# Patient Record
Sex: FEMALE | Race: WHITE | NOT HISPANIC OR LATINO | Employment: OTHER | URBAN - METROPOLITAN AREA
[De-identification: names, ages, dates, MRNs, and addresses within clinical notes are randomized per-mention and may not be internally consistent; named-entity substitution may affect disease eponyms.]

---

## 2018-03-13 ENCOUNTER — OFFICE VISIT CONVERTED (OUTPATIENT)
Dept: ORTHOPEDIC SURGERY | Facility: CLINIC | Age: 53
End: 2018-03-13
Attending: ORTHOPAEDIC SURGERY

## 2018-03-13 ENCOUNTER — CONVERSION ENCOUNTER (OUTPATIENT)
Dept: ORTHOPEDIC SURGERY | Facility: CLINIC | Age: 53
End: 2018-03-13

## 2018-03-22 ENCOUNTER — CONVERSION ENCOUNTER (OUTPATIENT)
Dept: ORTHOPEDIC SURGERY | Facility: CLINIC | Age: 53
End: 2018-03-22

## 2018-03-22 ENCOUNTER — OFFICE VISIT CONVERTED (OUTPATIENT)
Dept: ORTHOPEDIC SURGERY | Facility: CLINIC | Age: 53
End: 2018-03-22
Attending: ORTHOPAEDIC SURGERY

## 2019-03-07 ENCOUNTER — HOSPITAL ENCOUNTER (OUTPATIENT)
Dept: OTHER | Facility: HOSPITAL | Age: 54
Discharge: HOME | End: 2019-03-07

## 2019-03-07 LAB
BASOPHILS # BLD AUTO: 0.03 10*3/UL (ref 0–0.2)
BASOPHILS NFR BLD AUTO: 0.6 % (ref 0–3)
CONV ABS IMM GRAN: 0.01 10*3/UL (ref 0–0.2)
CONV IMMATURE GRAN: 0.2 % (ref 0–1.8)
DEPRECATED RDW RBC AUTO: 57.4 FL (ref 36.4–46.3)
EOSINOPHIL # BLD AUTO: 0.13 10*3/UL (ref 0–0.7)
EOSINOPHIL # BLD AUTO: 2.7 % (ref 0–7)
ERYTHROCYTE [DISTWIDTH] IN BLOOD BY AUTOMATED COUNT: 14.8 % (ref 11.7–14.4)
HBA1C MFR BLD: 11.1 G/DL (ref 12–16)
HCT VFR BLD AUTO: 34.6 % (ref 37–47)
LYMPHOCYTES # BLD AUTO: 2.08 10*3/UL (ref 1–5)
MCH RBC QN AUTO: 34.2 PG (ref 27–31)
MCHC RBC AUTO-ENTMCNC: 32.1 G/DL (ref 33–37)
MCV RBC AUTO: 106.5 FL (ref 81–99)
MONOCYTES # BLD AUTO: 0.77 10*3/UL (ref 0.2–1.2)
MONOCYTES NFR BLD AUTO: 16 % (ref 3–10)
NEUTROPHILS # BLD AUTO: 1.78 10*3/UL (ref 2–8)
NEUTROPHILS NFR BLD AUTO: 37.2 % (ref 30–85)
NRBC CBCN: 0 % (ref 0–0.7)
PLATELET # BLD AUTO: 137 10*3/UL (ref 130–400)
PMV BLD AUTO: 10.6 FL (ref 9.4–12.3)
RBC # BLD AUTO: 3.25 10*6/UL (ref 4.2–5.4)
VARIANT LYMPHS NFR BLD MANUAL: 43.3 % (ref 20–45)
WBC # BLD AUTO: 4.8 10*3/UL (ref 4.8–10.8)

## 2019-03-10 LAB
CD3 CELLS # BLD: 1177 CELLS/UL (ref 570–2400)
CD3+CD4+ CELLS/CD3+CD8+ CLL BLD: 0.09 RATIO (ref 0.8–3.9)
CONV LYMPHOCYTE SUBSET PANEL 3 INFO.: NORMAL
DEPRECATED CD4 CELLS # BLD: 84 CELLS/UL (ref 430–1800)
DEPRECATED CD8 CELLS # BLD: 959 CELLS/UL (ref 210–1200)

## 2019-09-25 ENCOUNTER — HOSPITAL ENCOUNTER (OUTPATIENT)
Dept: GENERAL RADIOLOGY | Facility: HOSPITAL | Age: 54
Discharge: HOME | End: 2019-09-25

## 2019-09-25 LAB
CREAT BLD-MCNC: 0.7 MG/DL (ref 0.6–1.4)
GFR SERPLBLD BASED ON 1.73 SQ M-ARVRAT: >60 ML/MIN/{1.73_M2}

## 2019-09-27 ENCOUNTER — CONVERSION ENCOUNTER (OUTPATIENT)
Dept: PODIATRY | Facility: CLINIC | Age: 54
End: 2019-09-27

## 2019-09-27 ENCOUNTER — OFFICE VISIT CONVERTED (OUTPATIENT)
Dept: PODIATRY | Facility: CLINIC | Age: 54
End: 2019-09-27
Attending: PODIATRIST

## 2019-11-25 ENCOUNTER — HOSPITAL ENCOUNTER (OUTPATIENT)
Dept: GENERAL RADIOLOGY | Facility: HOSPITAL | Age: 54
Discharge: HOME | End: 2019-11-25
Attending: NURSE PRACTITIONER

## 2019-11-26 ENCOUNTER — OFFICE VISIT CONVERTED (OUTPATIENT)
Dept: OTOLARYNGOLOGY | Facility: CLINIC | Age: 54
End: 2019-11-26
Attending: OTOLARYNGOLOGY

## 2019-12-19 ENCOUNTER — OFFICE VISIT CONVERTED (OUTPATIENT)
Dept: OTOLARYNGOLOGY | Facility: CLINIC | Age: 54
End: 2019-12-19
Attending: OTOLARYNGOLOGY

## 2020-04-17 ENCOUNTER — HOSPITAL ENCOUNTER (OUTPATIENT)
Dept: OTHER | Facility: HOSPITAL | Age: 55
Discharge: HOME | End: 2020-04-17

## 2020-04-17 LAB
25(OH)D3 SERPL-MCNC: 17.4 NG/ML (ref 30–100)
ALBUMIN SERPL-MCNC: 3.4 G/DL (ref 3.5–5)
ALBUMIN/GLOB SERPL: 0.8 {RATIO} (ref 1.4–2.6)
ALP SERPL-CCNC: 225 U/L (ref 53–141)
ALT SERPL-CCNC: 37 U/L (ref 10–40)
ANION GAP SERPL CALC-SCNC: 17 MMOL/L (ref 8–19)
AST SERPL-CCNC: 40 U/L (ref 15–50)
BASOPHILS # BLD AUTO: 0.03 10*3/UL (ref 0–0.2)
BASOPHILS NFR BLD AUTO: 0.6 % (ref 0–3)
BILIRUB SERPL-MCNC: 1.31 MG/DL (ref 0.2–1.3)
BUN SERPL-MCNC: 15 MG/DL (ref 5–25)
BUN/CREAT SERPL: 15 {RATIO} (ref 6–20)
CALCIUM SERPL-MCNC: 9.5 MG/DL (ref 8.7–10.4)
CHLORIDE SERPL-SCNC: 106 MMOL/L (ref 99–111)
CONV ABS IMM GRAN: 0.01 10*3/UL (ref 0–0.2)
CONV CO2: 26 MMOL/L (ref 22–32)
CONV IMMATURE GRAN: 0.2 % (ref 0–1.8)
CONV TOTAL PROTEIN: 7.9 G/DL (ref 6.3–8.2)
CREAT UR-MCNC: 1.02 MG/DL (ref 0.5–0.9)
DEPRECATED RDW RBC AUTO: 57.7 FL (ref 36.4–46.3)
EOSINOPHIL # BLD AUTO: 0.14 10*3/UL (ref 0–0.7)
EOSINOPHIL # BLD AUTO: 2.8 % (ref 0–7)
ERYTHROCYTE [DISTWIDTH] IN BLOOD BY AUTOMATED COUNT: 14.1 % (ref 11.7–14.4)
GFR SERPLBLD BASED ON 1.73 SQ M-ARVRAT: >60 ML/MIN/{1.73_M2}
GLOBULIN UR ELPH-MCNC: 4.5 G/DL (ref 2–3.5)
GLUCOSE SERPL-MCNC: 126 MG/DL (ref 65–99)
HCT VFR BLD AUTO: 40.5 % (ref 37–47)
HGB BLD-MCNC: 12.8 G/DL (ref 12–16)
LYMPHOCYTES # BLD AUTO: 2.28 10*3/UL (ref 1–5)
LYMPHOCYTES NFR BLD AUTO: 45.3 % (ref 20–45)
MCH RBC QN AUTO: 34.5 PG (ref 27–31)
MCHC RBC AUTO-ENTMCNC: 31.6 G/DL (ref 33–37)
MCV RBC AUTO: 109.2 FL (ref 81–99)
MONOCYTES # BLD AUTO: 0.48 10*3/UL (ref 0.2–1.2)
MONOCYTES NFR BLD AUTO: 9.5 % (ref 3–10)
NEUTROPHILS # BLD AUTO: 2.09 10*3/UL (ref 2–8)
NEUTROPHILS NFR BLD AUTO: 41.6 % (ref 30–85)
NRBC CBCN: 0 % (ref 0–0.7)
OSMOLALITY SERPL CALC.SUM OF ELEC: 302 MOSM/KG (ref 273–304)
PLATELET # BLD AUTO: 142 10*3/UL (ref 130–400)
PMV BLD AUTO: 10.3 FL (ref 9.4–12.3)
POTASSIUM SERPL-SCNC: 4.3 MMOL/L (ref 3.5–5.3)
RBC # BLD AUTO: 3.71 10*6/UL (ref 4.2–5.4)
SODIUM SERPL-SCNC: 145 MMOL/L (ref 135–147)
WBC # BLD AUTO: 5.03 10*3/UL (ref 4.8–10.8)

## 2020-04-19 LAB — Lab: NORMAL

## 2020-04-20 LAB
HIV1 RNA # SERPL NAA+PROBE: <20 COPIES/ML
HIV1 RNA # SERPL NAA+PROBE: NORMAL LOG10COPY/ML
Lab: NORMAL

## 2020-08-10 ENCOUNTER — HOSPITAL ENCOUNTER (OUTPATIENT)
Dept: UROLOGY | Facility: CLINIC | Age: 55
Discharge: HOME | End: 2020-08-10
Attending: UROLOGY

## 2020-08-10 ENCOUNTER — OFFICE VISIT CONVERTED (OUTPATIENT)
Dept: UROLOGY | Facility: CLINIC | Age: 55
End: 2020-08-10
Attending: UROLOGY

## 2020-08-12 ENCOUNTER — HOSPITAL ENCOUNTER (OUTPATIENT)
Dept: PREADMISSION TESTING | Facility: HOSPITAL | Age: 55
Discharge: HOME | End: 2020-08-12
Attending: UROLOGY

## 2020-08-13 LAB
AMOXICILLIN+CLAV SUSC ISLT: 4
AMPICILLIN SUSC ISLT: 8
AMPICILLIN+SULBAC SUSC ISLT: 4
BACTERIA UR CULT: ABNORMAL
CEFAZOLIN SUSC ISLT: <=4
CEFEPIME SUSC ISLT: <=1
CEFTAZIDIME SUSC ISLT: <=1
CEFTRIAXONE SUSC ISLT: <=1
CEFUROXIME ORAL SUSC ISLT: 4
CEFUROXIME PARENTER SUSC ISLT: 4
CIPROFLOXACIN SUSC ISLT: <=0.25
ERTAPENEM SUSC ISLT: <=0.5
GENTAMICIN SUSC ISLT: <=1
LEVOFLOXACIN SUSC ISLT: <=0.12
NITROFURANTOIN SUSC ISLT: <=16
SARS-COV-2 RNA SPEC QL NAA+PROBE: NOT DETECTED
TETRACYCLINE SUSC ISLT: <=1
TMP SMX SUSC ISLT: <=20
TOBRAMYCIN SUSC ISLT: <=1

## 2020-08-17 ENCOUNTER — HOSPITAL ENCOUNTER (OUTPATIENT)
Dept: PERIOP | Facility: HOSPITAL | Age: 55
Setting detail: HOSPITAL OUTPATIENT SURGERY
Discharge: HOME | End: 2020-08-17
Attending: UROLOGY

## 2020-08-17 LAB
ALBUMIN SERPL-MCNC: 2.9 G/DL (ref 3.5–5)
ALBUMIN/GLOB SERPL: 0.6 {RATIO} (ref 1.4–2.6)
ALP SERPL-CCNC: 99 U/L (ref 53–141)
ALT SERPL-CCNC: 28 U/L (ref 10–40)
ANION GAP SERPL CALC-SCNC: 14 MMOL/L (ref 8–19)
AST SERPL-CCNC: 43 U/L (ref 15–50)
BASOPHILS # BLD AUTO: 0.03 10*3/UL (ref 0–0.2)
BASOPHILS NFR BLD AUTO: 1 % (ref 0–3)
BILIRUB SERPL-MCNC: 1.07 MG/DL (ref 0.2–1.3)
BUN SERPL-MCNC: 11 MG/DL (ref 5–25)
BUN/CREAT SERPL: 17 {RATIO} (ref 6–20)
CALCIUM SERPL-MCNC: 9.8 MG/DL (ref 8.7–10.4)
CHLORIDE SERPL-SCNC: 104 MMOL/L (ref 99–111)
CONV ABS IMM GRAN: 0 10*3/UL (ref 0–0.2)
CONV CO2: 29 MMOL/L (ref 22–32)
CONV IMMATURE GRAN: 0 % (ref 0–1.8)
CONV TOTAL PROTEIN: 8.1 G/DL (ref 6.3–8.2)
CREAT UR-MCNC: 0.64 MG/DL (ref 0.5–0.9)
DEPRECATED RDW RBC AUTO: 51.4 FL (ref 36.4–46.3)
EOSINOPHIL # BLD AUTO: 0.1 10*3/UL (ref 0–0.7)
EOSINOPHIL # BLD AUTO: 3.2 % (ref 0–7)
ERYTHROCYTE [DISTWIDTH] IN BLOOD BY AUTOMATED COUNT: 13.8 % (ref 11.7–14.4)
GFR SERPLBLD BASED ON 1.73 SQ M-ARVRAT: >60 ML/MIN/{1.73_M2}
GLOBULIN UR ELPH-MCNC: 5.2 G/DL (ref 2–3.5)
GLUCOSE BLD-MCNC: 79 MG/DL (ref 65–99)
GLUCOSE SERPL-MCNC: 91 MG/DL (ref 65–99)
HCT VFR BLD AUTO: 42.1 % (ref 37–47)
HGB BLD-MCNC: 13.6 G/DL (ref 12–16)
LYMPHOCYTES # BLD AUTO: 1.35 10*3/UL (ref 1–5)
LYMPHOCYTES NFR BLD AUTO: 43.7 % (ref 20–45)
MCH RBC QN AUTO: 32.6 PG (ref 27–31)
MCHC RBC AUTO-ENTMCNC: 32.3 G/DL (ref 33–37)
MCV RBC AUTO: 101 FL (ref 81–99)
MONOCYTES # BLD AUTO: 0.51 10*3/UL (ref 0.2–1.2)
MONOCYTES NFR BLD AUTO: 16.5 % (ref 3–10)
NEUTROPHILS # BLD AUTO: 1.1 10*3/UL (ref 2–8)
NEUTROPHILS NFR BLD AUTO: 35.6 % (ref 30–85)
NRBC CBCN: 0 % (ref 0–0.7)
OSMOLALITY SERPL CALC.SUM OF ELEC: 295 MOSM/KG (ref 273–304)
PLATELET # BLD AUTO: 145 10*3/UL (ref 130–400)
PMV BLD AUTO: 10.3 FL (ref 9.4–12.3)
POTASSIUM SERPL-SCNC: 3.9 MMOL/L (ref 3.5–5.3)
RBC # BLD AUTO: 4.17 10*6/UL (ref 4.2–5.4)
SODIUM SERPL-SCNC: 143 MMOL/L (ref 135–147)
WBC # BLD AUTO: 3.09 10*3/UL (ref 4.8–10.8)

## 2020-11-16 ENCOUNTER — CONVERSION ENCOUNTER (OUTPATIENT)
Dept: FAMILY MEDICINE CLINIC | Facility: CLINIC | Age: 55
End: 2020-11-16

## 2020-11-16 ENCOUNTER — OFFICE VISIT CONVERTED (OUTPATIENT)
Dept: FAMILY MEDICINE CLINIC | Facility: CLINIC | Age: 55
End: 2020-11-16
Attending: NURSE PRACTITIONER

## 2020-12-03 ENCOUNTER — TELEMEDICINE CONVERTED (OUTPATIENT)
Dept: FAMILY MEDICINE CLINIC | Facility: CLINIC | Age: 55
End: 2020-12-03
Attending: NURSE PRACTITIONER

## 2021-01-05 ENCOUNTER — OFFICE VISIT CONVERTED (OUTPATIENT)
Dept: FAMILY MEDICINE CLINIC | Facility: CLINIC | Age: 56
End: 2021-01-05
Attending: NURSE PRACTITIONER

## 2021-01-06 ENCOUNTER — HOSPITAL ENCOUNTER (OUTPATIENT)
Dept: FAMILY MEDICINE CLINIC | Facility: CLINIC | Age: 56
Discharge: HOME | End: 2021-01-06
Attending: NURSE PRACTITIONER

## 2021-01-08 LAB
AMPICILLIN SUSC ISLT: 4
AMPICILLIN+SULBAC SUSC ISLT: <=2
BACTERIA UR CULT: ABNORMAL
CEFAZOLIN SUSC ISLT: <=4
CEFEPIME SUSC ISLT: <=0.12
CEFTAZIDIME SUSC ISLT: <=1
CEFTRIAXONE SUSC ISLT: <=0.25
CIPROFLOXACIN SUSC ISLT: <=0.25
ERTAPENEM SUSC ISLT: <=0.12
GENTAMICIN SUSC ISLT: <=1
LEVOFLOXACIN SUSC ISLT: <=0.12
NITROFURANTOIN SUSC ISLT: <=16
PIP+TAZO SUSC ISLT: <=4
TMP SMX SUSC ISLT: <=20
TOBRAMYCIN SUSC ISLT: <=1

## 2021-04-30 ENCOUNTER — CONVERSION ENCOUNTER (OUTPATIENT)
Dept: FAMILY MEDICINE CLINIC | Facility: CLINIC | Age: 56
End: 2021-04-30

## 2021-04-30 ENCOUNTER — OFFICE VISIT CONVERTED (OUTPATIENT)
Dept: FAMILY MEDICINE CLINIC | Facility: CLINIC | Age: 56
End: 2021-04-30
Attending: PHYSICIAN ASSISTANT

## 2021-04-30 LAB
BILIRUB UR QL STRIP: NORMAL
COLOR UR: NORMAL
CONV CLARITY OF URINE: CLEAR
CONV PROTEIN IN URINE BY AUTOMATED TEST STRIP: NORMAL
CONV UROBILINOGEN IN URINE BY AUTOMATED TEST STRIP: NORMAL
GLUCOSE UR QL: NEGATIVE
HGB UR QL STRIP: NEGATIVE
KETONES UR QL STRIP: NEGATIVE
LEUKOCYTE ESTERASE UR QL STRIP: NEGATIVE
NITRITE UR QL STRIP: NEGATIVE
PH UR STRIP.AUTO: 8.5 [PH]
SP GR UR: 1.02

## 2021-05-10 NOTE — PROCEDURES
Procedure Note      Patient Name: Janneth Montes   Patient ID: [id number]   Sex: Female   YOB: 1965    Primary Care Provider: Korina VICKERS   Referring Provider: Thad Hernandez MD    Visit Date: August 10, 2020    Provider: Barbara Diaz MD   Location: [facility]   Location Address: [address] [place] [address], [address]  [id number]   Location Phone: [phone]          The patient is a 55 year old /White female presents today for a KUB   Clinical History : Retained ureteral stent and Ureteral calculus, left   Technique: KUB   Findings: the left ureteral stent is in good location, possible stone behind stent in the pelvis. phelboliths in the pelvis. bones ok, gas pattern normal   Impression: as above   Patient is scheduled for surgery 08/17/2020           Assessment  · Ureteral Calculus     592.1/N20.1    Problems Reconciled  Plan  · Orders  o KUB xray [facility] (53464) - 592.1/N20.1 - 08/10/2020  · Medications  o Medications have been Reconciled  o Transition of Care or Provider Policy  · Instructions  o TIME OUT PROCEDURE: Correct patient and birth date; Correct procedure; Correct Physician; Consent signed            Electronically Signed by: ALISHA Allison -Author on August 10, 2020 04:13:16 PM  Electronically Co-signed by: Barbara Diaz MD -Reviewer on August 12, 2020 10:36:36 AM

## 2021-05-10 NOTE — H&P
History and Physical      Patient Name: Janneth Montes   Patient ID: [id number]   Sex: Female   YOB: 1965    Primary Care Provider: Juli VICKERS   Referring Provider: Juli VICKERS    Visit Date: November 16, 2020    Provider: RANCHO Snyder   Location: [facility]   Location Address: [address] [place] [address], [address]  [place], [place]  [id number]   Location Phone: [phone]          Chief Complaint  · New Pt - establish care      History Of Present Illness  Janneth Montes is a 55 year old /White female who presents for evaluation and treatment of:      Pt is here to establish care. Pt was previously seen by Dr. Luciano. Will get records. Pt is seen at [facility] for pain management for chronic back pain/DDD. She has a f/u appt 11/23/2020. Pt is also seen at the [address] [place] [address] for her HIV. Pt was last seen 6 mo ago. and has an upcoming appt.    Pt is due labs.     Pt is due mammogram.     Pt has had a partial hysterectomy last pap since 1994.     Pt had colonoscopy in 2013.       Past Medical History  Disease Name Date Onset Notes   Allergic rhinitis due to allergen --  --    Arthritis --  --    Avascular necrosis 09/29/2015 --    Cancer --  cervical   Diabetes Mellitus, Type II --  --    Epilepsy --  --    Essential hypertension --  --    Hepatitis C --  --    HIV positive --  --    Migraines --  --    Neck abscess --  --    Neck mass --  --    Ovary Neoplasm, Malignant --  --    Retained ureteral stent 08/10/2020 --    Skin Disease --  --    STD (female) --  --    Ureteral calculus, left 08/10/2020 --          Past Surgical History  Procedure Name Date Notes   Ankle repair 1989 --    Appendectomy 1998 --    Back surgery --  X4   Colonoscopy 2013 --    endoscopy 2008 2013 --    Hysterectomy 1995 --    Liver biopsy 2014 --    Metal implants --  metal plates, spine DDD         Medication List  Name Date Started Instructions   dapsone 100 mg  oral tablet  take 1 tablet (100 mg) by oral route once daily   Descovy 200-25 mg oral tablet  take 1 tablet by oral route once daily   gabapentin 800 mg oral tablet  take 1 tablet (800 mg) by oral route 3 times per day   Imitrex 100 mg oral tablet 11/16/2020 take 1 tablet (100 mg) by oral route once with fluids as early as possible after the onset of a migraine attack;may repeat after 2 hours if headache returns, not to exceed 200mg in 24hrs   Percocet  mg oral tablet  take 1 tablet by oral route every 6 hours as needed   Vitamin D2 1,250 mcg (50,000 unit) oral capsule  take 1 capsule by oral route once monthly for 90 days         Allergy List  Allergen Name Date Reaction Notes   Bactrim --  --  --    Cipro --  --  --    Flagyl --  --  --    Keflex --  --  --    SULFA (SULFONAMIDES) --  --  --        Allergies Reconciled  Family Medical History  Disease Name Relative/Age Notes   Breast Neoplasm, Malignant  --    Cervical Neoplasm, Malignant  --    Family history of certain chronic disabling diseases; arthritis Brother/  Mother/  Sister/   Mother; Sister; Brother  Mother; Father   Family history of ovarian cancer Mother/   --          Social History  Finding Status Start/Stop Quantity Notes   Alcohol Use Never --/-- --  does not drink   Claustophobic Unknown --/-- --  yes   Homemaker. --  --/-- --  --    lives with spouse --  --/-- --  --    Living will in place --  --/-- --  --    . --  --/-- --  --    Recreational Drug Use Never --/-- --  no   Tobacco Never --/-- --  never smoker   Unemployed. --  --/-- --  --          Review of Systems  · Constitutional  o Denies  o : fever, fatigue, weight loss, weight gain  · HENT  o Admits  o : headaches  · Breasts  o Denies  o : lumps, tenderness, swelling, nipple discharge  · Cardiovascular  o Denies  o : lower extremity edema, claudication, chest pressure, palpitations  · Respiratory  o Denies  o : shortness of breath, wheezing, cough, hemoptysis, dyspnea on  "exertion  · Gastrointestinal  o Denies  o : nausea, vomiting, diarrhea, constipation, abdominal pain  · Musculoskeletal  o Admits  o : back pain      Vitals  Date Time BP Position Site L\R Cuff Size HR RR TEMP (F) WT  HT  BMI kg/m2 BSA m2 O2 Sat FR L/min FiO2 HC       12/19/2019 10:32 AM       18 98.9 176lbs 8oz 5'  8\" 26.84 1.96       08/10/2020 02:55 /71 Sitting    105 - R   193lbs 16oz 5'  8\" 29.5 2.05       11/16/2020 02:26 /67 Sitting    101 - R   193lbs 6oz 5'  8\" 29.4 2.05 95 %  21%          Physical Examination  · Constitutional  o Appearance  o : well-nourished, well developed, alert, in no acute distress  · Ears, Nose, Mouth and Throat  o Ears  o :   § External Ears  § : appearance within normal limits, no lesions present  § Otoscopic Examination  § : tympanic membrane appearance within normal limits bilaterally without perforations, mobility normal  o Nose  o :   § External Nose  § : normal stucture noted.  § Intranasal Exam  § : no swelling, reddness, turbs normal reny.  o Oral Cavity  o :   § Oral Mucosa  § : oral mucosa normal without pallor or cyanosis  § Lips  § : lip appearance normal  § Teeth  § : normal dentition for age  § Gums  § : gums pink, non-swollen, no bleeding present  § Tongue  § : tongue appearance normal  § Palate  § : hard palate normal, soft palate appearance normal  o Throat  o :   § Oropharynx  § : no inflammation or lesions present, tonsils within normal limits  · Respiratory  o Respiratory Effort  o : breathing unlabored  o Auscultation of Lungs  o : normal breath sounds throughout  · Cardiovascular  o Heart  o :   § Auscultation of Heart  § : regular rate and rhythm, no murmurs, gallops or rubs  § Palpation of Heart  § : normal apical impulse, no cardiac thrill present  o Peripheral Vascular System  o :   § Extremities  § : no cyanosis, clubbing or edema; less than 2 second refill noted  · Gastrointestinal  o Abdominal Examination  o : abdomen nontender to palpation, " tone normal without rigidity or guarding, no masses present, abdominal contour scaphoid  o Liver and spleen  o : no hepatomegaly present, liver nontender to palpation, spleen not palpable  · Skin and Subcutaneous Tissue  o General Inspection  o : no rashes or lesions present, no areas of discoloration  · Neurologic  o Mental Status Examination  o :   § Orientation  § : grossly oriented to person, place and time  o Cranial Nerves  o : cranial nerves intact and symmetric throughout  · Psychiatric  o Mood and Affect  o : mood normal, affect appropriate, denies any SI/HI          Assessment  · Allergic rhinitis due to allergen     477.9/J30.9  · Essential hypertension     401.9/I10  · Fatigue     780.79/R53.83  · Screening for depression     V79.0/Z13.89  · Visit for screening mammogram     V76.12/Z12.31  · Avascular necrosis     733.40/M87.9  · Arthritis     716.90/M19.90  · Hepatitis C     070.54  · HIV positive     V08/Z21  · Epilepsy     345.90/G40.909  · Migraines     346.90/G43.909  · STD (female)     099.9/A64  · Skin Disease     709.9/L98.9  · Lower thoracic back pain     724.1/M54.6  · Hyperglycemia     790.29/R73.9      Plan  · Orders  o HTN/Lipid Panel (CMP, Lipid) [facility] (00168, 09207) - 401.9/I10 - 11/16/2020  o CBC with Auto Diff [facility] (92686) - 401.9/I10 - 11/16/2020  o Thyroid Profile (21563, THYII, 48423) - 780.79/R53.83 - 11/16/2020  o ACO-18: Negative screen for clinical depression using a standardized tool () - V79.0/Z13.89 - 11/16/2020  o Screening Mammography; Bilateral 3D (62830, 53215, ) - V76.12/Z12.31 - 11/16/2020  o Hgb A1c [facility] (66468) - 790.29/R73.9 - 11/16/2020  o ACO-39: Current medications updated and reviewed (1159F, ) - - 11/16/2020  o ACO-19: Colorectal cancer screening results documented and reviewed (3017F) - - 11/16/2020  o Colonoscopy (80914) - - 11/16/2020  · Medications  o Medications have been Reconciled  o Transition of Care or Provider  Policy  · Instructions  o Depression Screen completed and scanned into the EMR under the designated folder within the patient's documents.  o Today's PHQ-9 result is _4__  o The provider screening met the required time of 15 minutes.  o Patient was educated/instructed on their diagnosis, treatment and medications prior to discharge from the clinic today.  o Patient instructed to seek medical attention urgently for new or worsening symptoms.  o Call the office with any concerns or questions.  · Disposition  o Return Visit Request in/on 6 months +/- 2 weeks (43589).            Electronically Signed by: RANCHO Snyder -Author on November 16, 2020 02:55:51 PM

## 2021-05-13 NOTE — PROGRESS NOTES
Progress Note      Patient Name: Janneth Montes   Patient ID: [id number]   Sex: Female   YOB: 1965    Primary Care Provider: Korina VICKERS   Referring Provider: Thad Hernandez MD    Visit Date: August 10, 2020    Provider: Barbara Diaz MD   Location: [facility]   Location Address: [address] [place] [address], [address]  [id number]   Location Phone: [phone]          Chief Complaint  · Postop visit      History Of Present Illness  The patient presents for follow-up after a cysto left stent insertion on 7/25/2020 . Patient had sepsis from left ureteral calculus and stent was left in. Patient is still having pain in the left flank. Finished her antibiotic yesterday       Past Medical History  Arthritis; Avascular necrosis; Cancer; Diabetes; Hepatitis C; HIV positive; Neck abscess; Neck mass; Ovary Neoplasm, Malignant; Retained ureteral stent; Ureteral calculus, left         Past Surgical History  Ankle repair; Appendectomy; Back surgery; Colonoscopy; endoscopy; Hysterectomy; Liver biopsy; Metal implants         Medication List  dapsone 100 mg oral tablet; Descovy 200-25 mg oral tablet; diclofenac sodium 50 mg oral tablet,delayed release (DR/EC); gabapentin 800 mg oral tablet; Imitrex 100 mg oral tablet; oxycodone-acetaminophen  mg oral tablet         Allergy List  Bactrim; Cipro; Flagyl; Keflex; SULFA (SULFONAMIDES)       Allergies Reconciled  Family Medical History  Breast Neoplasm, Malignant; Cervical Neoplasm, Malignant; Family history of certain chronic disabling diseases; arthritis; Family history of ovarian cancer         Social History  Alcohol Use (Never); Claustophobic (Unknown); Homemaker.; lives with spouse; Living will in place; .; Recreational Drug Use (Never); Tobacco (Never); Unemployed.         Review of Systems  · Constitutional  o Denies  o : fever, headache, chills  · Eyes  o Denies  o : eye pain, double vision, blurred  "vision  · HENT  o Denies  o : sinus problems, sore throat, ear infection  · Cardiovascular  o Denies  o : chest pain, high blood pressure, varicosities  · Respiratory  o Denies  o : shortness of breath, wheezing, frequent cough  · Gastrointestinal  o Denies  o : nausea, vomiting, heartburn, indigestion, abdominal pain  · Genitourinary  o Admits  o : frequency  o Denies  o : urgency, urinary retention, painful urination  · Integument  o Denies  o : rash, itching, boils  · Neurologic  o Denies  o : tingling or numbness, tremors, dizzy spells  · Musculoskeletal  o Admits  o : back pain  o Denies  o : joint pain, neck pain  · Endocrine  o Denies  o : cold intolerance, heat intolerance, tired, excessive thirst, sluggish  · Psychiatric  o Admits  o : feels satisfied with life  o Denies  o : severe depression, concerns with hurting themselves  · Heme-Lymph  o Denies  o : swollen glands, blood clotting problems  · Allergic-Immunologic  o Denies  o : sinus allergy symptoms, hay fever      Vitals  Date Time BP Position Site L\R Cuff Size HR RR TEMP (F) WT  HT  BMI kg/m2 BSA m2 O2 Sat        08/10/2020 02:55 /71 Sitting    105 - R   193lbs 16oz 5'  8\" 29.5 2.05           Physical Examination  · Constitutional  o Appearance  o : Well nourished, well developed patient in no acute distress. Ambulating without difficulty.  · Respiratory  o Respiratory Effort  o : breathing unlabored  o Inspection of Chest  o : normal appearance, no retractions  o Auscultation of Lungs  o : normal breath sounds throughout  · Cardiovascular  o Heart  o :   § Auscultation of Heart  § : regular rate and rhythm, no murmurs, gallops or rubs  o Peripheral Vascular System  o : No abnormalities  · Gastrointestinal  o Abdominal Examination  o : abdomen tender to palpation in left flank and left upper quadrant, tone normal without rigidity or guarding, no masses present, abdominal contour scaphoid  o Liver and spleen  o : no " abnormalities  o Hernias  o : no hernias present  · Lymphatic  o Groin  o : No lymphadenopathy present  · Skin and Subcutaneous Tissue  o General Inspection  o : No rashes, lesions or areas of discoloration present. Skin turgor is normal.  · Neurologic  o Mental Status Examination  o : grossly oriented to person, place and time  o Gait and Station  o : normal gait, able to stand without difficulty  · Psychiatric  o Mood and Affect  o : mood normal, affect appropriate      Figure 1.0: Pain Rating Scale-[place]         Results  · In-Office Procedures  o Lab procedure  § Automated dipstick urinalysis with microscopy (74508)   § Color Ur: Yellow   § Clarity Ur: Clear   § Glucose Ur Ql Strip: Negative   § Bilirub Ur Ql Strip: Negative   § Ketones Ur Ql Strip: Negative   § Sp Gr Ur Qn: 1.010   § Hgb Ur Ql Strip: Trace-Intact   § pH Ur-LsCnc: 7.5   § Prot Ur Ql Strip: 100 mg/dL   § Urobilinogen Ur Strip-mCnc: 4.0 E.U./dL   § Nitrite Ur Ql Strip: Negative   § WBC Est Ur Ql Strip: Trace   § RBC UrnS Qn HPF: 0   § WBC UrnS Qn HPF: 0   § Bacteria UrnS Qn HPF: 0   § Crystals UrnS Qn HPF: Amorphous sediments   § Epithelial Cells (non renal): 0 /HPF  § Epithelial Cells (renal): 0       Assessment  · Hepatitis C     070.54  · Retained ureteral stent     V43.89/Z96.0  · Ureteral calculus, left     592.1/N20.1    Problems Reconciled  Plan  · Orders  o Urine culture (31388, 71935) - 070.54, V43.89/Z96.0, 592.1/N20.1 - 08/10/2020  · Medications  o Medications have been Reconciled  o Transition of Care or Provider Policy  · Instructions  o Will do urine culture today and treat if needed. For cystoscopy removal of left ureteral stent ureteroscopy stone extraction and stent insertion again if needed            Electronically Signed by: Barbara Diaz MD -Author on August 10, 2020 03:36:19 PM

## 2021-05-13 NOTE — PROGRESS NOTES
Progress Note      Patient Name: Janneth Montes   Patient ID: [id number]   Sex: Female   YOB: 1965    Primary Care Provider: Juli VICKERS   Referring Provider: Juli VICKERS    Visit Date: December 3, 2020    Provider: RANCHO Snyder   Location: [facility]   Location Address: [address] [place] [address], [address]  [place]  [id number]   Location Phone: [phone]          Chief Complaint  · ER f/u      History Of Present Illness  Video Conferencing Visit  Janneth Montes is a 55 year old /White female who is presenting for evaluation via video conferencing via [employer]. Verbal consent obtained before beginning visit.   The following staff were present during this visit: RANCHO Snyder and ASHLEY Rodriguez   Janneth Montes is a 55 year old /White female who presents for evaluation and treatment of:      Pt was recently seen in the ER for impaired speech, off balance, and H/A. She had a CT at the ER and was WNL.  Pt states she is not feeling any better today. Her H/A is still severely present. She states she has migraines often and wants something stronger to take. Pt takes Imitrex and feels it is no longer strong enough.  She has never taken a preventative medication for migraines and is interested in taking one.       Past Medical History  Disease Name Date Onset Notes   Allergic rhinitis due to allergen --  --    Arthritis --  --    Avascular necrosis 09/29/2015 --    Cancer --  cervical   Diabetes Mellitus, Type II --  --    Epilepsy --  --    Essential hypertension --  --    Hepatitis C --  --    HIV positive --  --    Migraines --  --    Neck abscess --  --    Neck mass --  --    Ovary Neoplasm, Malignant --  --    Retained ureteral stent 08/10/2020 --    Skin Disease --  --    STD (female) --  --    Ureteral calculus, left 08/10/2020 --          Past Surgical History  Procedure Name Date Notes   Ankle repair 1989 --     Appendectomy 1998 --    Back surgery --  X4   Colonoscopy 5/22/13 --    endoscopy 2008 2013 --    Hysterectomy 1995 --    Liver biopsy 2014 --    Metal implants --  metal plates, spine DDD         Medication List  Name Date Started Instructions   dapsone 100 mg oral tablet  take 1 tablet (100 mg) by oral route once daily   Descovy 200-25 mg oral tablet  take 1 tablet by oral route once daily   gabapentin 800 mg oral tablet  take 1 tablet (800 mg) by oral route 3 times per day   Imitrex 100 mg oral tablet 11/19/2020 take 1 tablet by oral route once early as possible after the onset of a migraine;repeat in 2 hours if needed, not to exceed 200mg in 24hrs   Percocet  mg oral tablet  take 1 tablet by oral route every 6 hours as needed   Vitamin D2 1,250 mcg (50,000 unit) oral capsule  take 1 capsule by oral route once monthly for 90 days         Allergy List  Allergen Name Date Reaction Notes   Bactrim --  --  --    Cipro --  --  --    Flagyl --  --  --    Keflex --  --  --    SULFA (SULFONAMIDES) --  --  --          Family Medical History  Disease Name Relative/Age Notes   Breast Neoplasm, Malignant  --    Cervical Neoplasm, Malignant  --    Family history of certain chronic disabling diseases; arthritis Brother/  Mother/  Sister/   Mother; Sister; Brother  Mother; Father   Family history of ovarian cancer Mother/   --          Social History  Finding Status Start/Stop Quantity Notes   Alcohol Use Never --/-- --  does not drink   Claustophobic Unknown --/-- --  yes   Homemaker. --  --/-- --  --    lives with spouse --  --/-- --  --    Living will in place --  --/-- --  --    . --  --/-- --  --    Recreational Drug Use Never --/-- --  no   Tobacco Never --/-- --  never smoker   Unemployed. --  --/-- --  --          Review of Systems  · Constitutional  o Admits  o : fatigue  o Denies  o : fever, weight loss, weight gain  · HENT  o Admits  o : headaches  · Cardiovascular  o Denies  o : lower extremity  edema, claudication, chest pressure, palpitations  · Respiratory  o Denies  o : shortness of breath, wheezing, cough, hemoptysis, dyspnea on exertion  · Gastrointestinal  o Denies  o : nausea, vomiting, diarrhea, constipation, abdominal pain      Physical Examination  · Constitutional  o Appearance  o : well-nourished, well developed, alert, in no acute distress  · Neurologic  o Mental Status Examination  o :   § Orientation  § : grossly oriented to person, place and time  · Psychiatric  o Mood and Affect  o : mood normal, affect appropriate          Assessment  · Allergic rhinitis due to allergen     477.9/J30.9  · Essential hypertension     401.9/I10  · Headache     784.0/R51  · Hepatitis C     070.54  · Migraines     346.90/G43.909  · HIV (human immunodeficiency virus infection)     V08/B20      Plan  · Orders  o ACO-39: Current medications updated and reviewed (, 1159F) - - 12/03/2020  · Medications  o Aimovig Autoinjector 140 mg/mL subcutaneous auto-injector   SIG: inject 1 milliliter (140 mg) by subcutaneous route once a month in the abdomen, thigh, or outer area of upper arm   DISP: (3) Syringe with 1 refills  Prescribed on 12/03/2020     o Medications have been Reconciled  o Transition of Care or Provider Policy  · Instructions  o Patient was educated/instructed on their diagnosis, treatment and medications prior to discharge from the clinic today.  o Patient instructed to seek medical attention urgently for new or worsening symptoms.  o Call the office with any concerns or questions.  o pt to bring syringe of Amovig into office when she picks up from pharmacy for injection teaching/administration  o instructed pt she could still use Imitrex for breakthrough migraines  · Disposition  o Call or Return if symptoms worsen or persist.            Electronically Signed by: Juli Cam APRN -Author on December 3, 2020 08:16:33 AM

## 2021-05-14 VITALS
OXYGEN SATURATION: 95 % | HEART RATE: 101 BPM | WEIGHT: 193.37 LBS | SYSTOLIC BLOOD PRESSURE: 134 MMHG | DIASTOLIC BLOOD PRESSURE: 67 MMHG | HEIGHT: 68 IN | BODY MASS INDEX: 29.31 KG/M2

## 2021-05-14 VITALS
BODY MASS INDEX: 27.28 KG/M2 | HEIGHT: 68 IN | OXYGEN SATURATION: 99 % | WEIGHT: 180 LBS | HEART RATE: 95 BPM | SYSTOLIC BLOOD PRESSURE: 132 MMHG | DIASTOLIC BLOOD PRESSURE: 74 MMHG

## 2021-05-14 NOTE — PROGRESS NOTES
Progress Note      Patient Name: Janneth Montes   Patient ID: [id number]   Sex: Female   YOB: 1965    Primary Care Provider: Juli VICKERS   Referring Provider: Juli VICKERS    Visit Date: January 5, 2021    Provider: RANCHO Snyder   Location: [facility]   Location Address: [address] [place] [address], [address]  [place]  [id number]   Location Phone: [phone]          Chief Complaint  · Annual Wellness Exam      History Of Present Illness  The patient is a 55 year old /White female who is presenting for evaluation via video conferencing for her Annual Wellness Visit. Verbal consent obtained before beginning visit.   The following staff were present during the visit: RANCHO Snyder and ASHLEY Rodriguez   Her Primary Care Provider is Juli VICKERS. Her comprehensive Care Team list, including suppliers, has been updated on the Facesheet. Her medical/family history, height, weight, BMI, and blood pressure have been reviewed and are in the chart. The Health Risk Assessment has been completed and scanned in the chart.   Medications are listed in the medication list.   The active problem list includes: Allergic rhinitis due to allergen, Arthritis, Avascular necrosis, Epilepsy, Essential hypertension, Hepatitis C, HIV positive, Migraines, Neck abscess, Neck mass, Retained ureteral stent, Skin Disease, STD (female), and Ureteral calculus, left   The patient does not have a history of substance use.   Patient reports her diet is adequate.   The Mini-Cog has been administered and is scanned in chart. The results are negative. Her cognitive function is without limitation.   A hearing loss screen was completed today and the result is negative.   Patient has the following risk factors for depression: has experienced significant loss recently. Patient completed the PHQ-9 today and it has been scanned in the chart. The total score is 10-14.   The Timed Up  and Go screen was administered today and the result is negative.   The Lamar Index of [place] in ADLs indicated full function (score of 6).   A Falls Risk Assessment has been completed, including a review of home fall hazards and medication review.   Overall, the patient's functional ability is noted by this provider to be within normal limits. Her level of safety is noted to be within normal limits. Her balance/gait is within normal limits. There have been no falls in the past year. Patient-specific home safety recommendations have been reviewed and a copy has been given to patient.   She denies issues with leaking urine.   There are no additional risk factors identified.   Living Will/Advanced Directive has not previously been completed.   Personalized health advice was given to the patient and a written health screening schedule was established; see Plan for details.       Past Medical History  Disease Name Date Onset Notes   Allergic rhinitis due to allergen --  --    Arthritis --  --    Avascular necrosis 09/29/2015 --    Cancer --  cervical   Diabetes Mellitus, Type II --  --    Epilepsy --  --    Essential hypertension --  --    Hepatitis C --  --    HIV positive --  --    Migraines --  --    Neck abscess --  --    Neck mass --  --    Ovary Neoplasm, Malignant --  --    Retained ureteral stent 08/10/2020 --    Skin Disease --  --    STD (female) --  --    Ureteral calculus, left 08/10/2020 --          Past Surgical History  Procedure Name Date Notes   Ankle repair 1989 --    Appendectomy 1998 --    Back surgery --  X4   Colonoscopy 5/22/13 --    endoscopy 2008 2013 --    Hysterectomy 1995 --    Liver biopsy 2014 --    Metal implants --  metal plates, spine DDD         Medication List  Name Date Started Instructions   dapsone 100 mg oral tablet  take 1 tablet (100 mg) by oral route once daily   Descovy 200-25 mg oral tablet  take 1 tablet by oral route once daily   gabapentin 800 mg oral tablet  take 1  tablet (800 mg) by oral route 3 times per day   Maxalt 10 mg oral tablet 12/03/2020 take 1 tablet (10 mg) by oral route once, may repeat at 2 hour intervals; do not exceed 30 mg in 24 hours   Percocet  mg oral tablet  take 1 tablet by oral route every 6 hours as needed   Vitamin D2 1,250 mcg (50,000 unit) oral capsule  take 1 capsule by oral route once monthly for 90 days         Allergy List  Allergen Name Date Reaction Notes   Bactrim --  --  --    Cipro --  --  --    Flagyl --  --  --    Keflex --  --  --    SULFA (SULFONAMIDES) --  --  --        Allergies Reconciled  Family Medical History  Disease Name Relative/Age Notes   Breast Neoplasm, Malignant  --    Cervical Neoplasm, Malignant  --    Family history of certain chronic disabling diseases; arthritis Brother/  Mother/  Sister/   Mother; Sister; Brother  Mother; Father   Family history of ovarian cancer Mother/   --          Social History  Finding Status Start/Stop Quantity Notes   Alcohol Use Never --/-- --  does not drink   Claustophobic Unknown --/-- --  yes   Homemaker. --  --/-- --  --    lives with spouse --  --/-- --  --    Living will in place --  --/-- --  --    . --  --/-- --  --    Recreational Drug Use Never --/-- --  no   Tobacco Never --/-- --  never smoker   Unemployed. --  --/-- --  --          Physical Examination  · Ears, Nose, Mouth and Throat  o Ears  o :   § External Ears  § : appearance within normal limits, no lesions present  § Otoscopic Examination  § : tympanic membrane appearance within normal limits bilaterally without perforations, mobility normal  o Nose  o :   § External Nose  § : normal stucture noted.  § Intranasal Exam  § : no swelling, reddness, turbs normal reny.  o Oral Cavity  o :   § Oral Mucosa  § : oral mucosa normal without pallor or cyanosis  § Lips  § : lip appearance normal  § Teeth  § : normal dentition for age  § Gums  § : gums pink, non-swollen, no bleeding present  § Tongue  § : tongue appearance  normal  § Palate  § : hard palate normal, soft palate appearance normal  o Throat  o :   § Oropharynx  § : no inflammation or lesions present, tonsils within normal limits  · Respiratory  o Respiratory Effort  o : breathing unlabored  o Auscultation of Lungs  o : normal breath sounds throughout  · Cardiovascular  o Heart  o :   § Auscultation of Heart  § : regular rate and rhythm, no murmurs, gallops or rubs  § Palpation of Heart  § : normal apical impulse, no cardiac thrill present  o Peripheral Vascular System  o :   § Carotid Arteries  § : normal pulses bilaterally, no bruits present  § Pedal Pulses  § : pulses 2 bilaterally  § Extremities  § : no cyanosis, clubbing or edema; less than 2 second refill noted  · Gastrointestinal  o Abdominal Examination  o : abdomen nontender to palpation, tone normal without rigidity or guarding, no masses present, abdominal contour scaphoid  o Liver and spleen  o : no hepatomegaly present, liver nontender to palpation, spleen not palpable  · Musculoskeletal  o Right Upper Extremity  o :   § Inspection/Palpation  § : no tenderness to palpation  § Range of Motion  § : range of motion normal, no joint crepitus or pain with motion present  o Left Upper Extremity  o :   § Inspection/Palpation  § : no tenderness to palpation  § Range of Motion  § : range of motion normal, no joint crepitus present, no pain with joint motion  o Right Lower Extremity  o :   § Inspection/Palpation  § : no joint or limb tenderness to palpation  § Range of Motion  § : range of motion normal, no joint crepitations present, no pain on motion  o Left Lower Extremity  o :   § Inspection/Palpation  § : no joint or limb tenderness to palpation  § Range of Motion  § : range of motion normal, no joint crepitations present, no pain on motion  · Skin and Subcutaneous Tissue  o General Inspection  o : no rashes or lesions present, no areas of discoloration  · Neurologic  o Mental Status Examination  o :    § Orientation  § : grossly oriented to person, place and time  o Cranial Nerves  o : cranial nerves intact and symmetric throughout  · Psychiatric  o Mood and Affect  o : mood normal, affect appropriate, denies any SI/HI          Assessment  · Encounter for Medicare annual wellness exam     V70.0/Z00.00  · Screening for depression     V79.0/Z13.89  · Screening for alcoholism     V79.1/Z13.39  · Advanced care planning/counseling discussion     V65.49/Z71.89      Plan  · Orders  o Falls Risk Assessment Completed (3288F) - V70.0/Z00.00 - 01/05/2021  o Brief hearing screening (written) [facility] () - V70.0/Z00.00 - 01/05/2021  o Annual Wellness Visit-includes a Personalized Prevention Plan of Service (PPS), SUBSEQUENT VISIT (Medicare) () - V70.0/Z00.00 - 01/05/2021  o ACO-13: Fall Risk Screening with no falls in past year or only one fall without injury in the past year (1101F) - V70.0/Z00.00 - 01/05/2021  o Presence or absence of urinary incontinence assessed (ISAIAS) (1090F) - V70.0/Z00.00 - 01/05/2021  o Annual depression screening using the PHQ-9 tool, 15 minutes (49273, ) - V79.0/Z13.89 - 01/05/2021  o ACO-18: Positive screen for clinical depression using a standardized tool and a follow-up plan documented () - V79.0/Z13.89 - 01/05/2021  o Negative alcohol screening () - V79.1/Z13.39 - 01/05/2021  o ACO - Pt declines to or was not able to provide an Advance Care Plan or name a Surrogate Decision Maker (1124F) - V65.49/Z71.89 - 01/05/2021  o ACO-19: Colorectal cancer screening results documented and reviewed (3017F) - - 01/05/2021  o ACO-39: Current medications updated and reviewed (9589F, ) - - 01/05/2021  · Medications  o Medications have been Reconciled  o Transition of Care or Provider Policy  · Instructions  o Health Risk Assessment has been reviewed with the patient.  o Written health screening schedule for next 5-10 years was established with patient; information scanned in chart and  given/mailed to patient.  o Fall prevention methods discussed and a copy of recommendations given/mailed to patient.  o Positive depression screen. Today's Plan: __11___. Pt has recently experienced a significant loss in the family.   o Depression Screen completed and scanned into the EMR under the designated folder within the patient's documents.  o The provider screening met the required time of 15 minutes.  o Audit-C score of 0-4 - Negative Screen - Brief Discussion  o Face-to-face Advanced Care Planning discussed for a minimum of 16 minutes.            Electronically Signed by: RANCHO Snyder -Author on January 5, 2021 02:00:46 PM

## 2021-05-14 NOTE — PROGRESS NOTES
Progress Note      Patient Name: Janneth Montes   Patient ID: [id number]   Sex: Female   YOB: 1965    Primary Care Provider: Juli VICKERS   Referring Provider: Juli VICKERS    Visit Date: January 5, 2021    Provider: RANCHO Snyder   Location: [facility]   Location Address: [address] [place] [address], [address]  [place], [place]  [id number]   Location Phone: [phone]          Chief Complaint  · Possible UTI  · migraine      History Of Present Illness  Video Conferencing Visit  Janneth Montes is a 55 year old /White female who is presenting for evaluation via video conferencing via [employer]. Verbal consent obtained before beginning visit.   The following staff were present during this visit: RANCHO Snyder and ASHLEY Rodriguez   Janneth Montes is a 55 year old /White female who presents for evaluation and treatment of:      Pt has c/o possible UTI. S/S include malodorous urine and dark in color. Pt states this has been has present for   3 weeks. She denies any pain with urination.    Pt also has c/o recurring migraines and would like a referral to neurology. Amovig was prescribed for her but per her insurance she must see a neurologists first to be able to get it.  The  Maxalt she is taking is not helping.         Past Medical History  Disease Name Date Onset Notes   Allergic rhinitis due to allergen --  --    Arthritis --  --    Avascular necrosis 09/29/2015 --    Cancer --  cervical   Diabetes Mellitus, Type II --  --    Epilepsy --  --    Essential hypertension --  --    Hepatitis C --  --    HIV positive --  --    Migraines --  --    Neck abscess --  --    Neck mass --  --    Ovary Neoplasm, Malignant --  --    Retained ureteral stent 08/10/2020 --    Skin Disease --  --    STD (female) --  --    Ureteral calculus, left 08/10/2020 --          Past Surgical History  Procedure Name Date Notes   Ankle repair 1989 --     Appendectomy 1998 --    Back surgery --  X4   Colonoscopy 5/22/13 --    endoscopy 2008 2013 --    Hysterectomy 1995 --    Liver biopsy 2014 --    Metal implants --  metal plates, spine DDD         Medication List  Name Date Started Instructions   dapsone 100 mg oral tablet  take 1 tablet (100 mg) by oral route once daily   Descovy 200-25 mg oral tablet  take 1 tablet by oral route once daily   gabapentin 800 mg oral tablet  take 1 tablet (800 mg) by oral route 3 times per day   Maxalt 10 mg oral tablet 12/03/2020 take 1 tablet (10 mg) by oral route once, may repeat at 2 hour intervals; do not exceed 30 mg in 24 hours   Percocet  mg oral tablet  take 1 tablet by oral route every 6 hours as needed   Vitamin D2 1,250 mcg (50,000 unit) oral capsule  take 1 capsule by oral route once monthly for 90 days         Allergy List  Allergen Name Date Reaction Notes   Bactrim --  --  --    Cipro --  --  --    Flagyl --  --  --    Keflex --  --  --    SULFA (SULFONAMIDES) --  --  --          Family Medical History  Disease Name Relative/Age Notes   Breast Neoplasm, Malignant  --    Cervical Neoplasm, Malignant  --    Family history of certain chronic disabling diseases; arthritis Brother/  Mother/  Sister/   Mother; Sister; Brother  Mother; Father   Family history of ovarian cancer Mother/   --          Social History  Finding Status Start/Stop Quantity Notes   Alcohol Use Never --/-- --  does not drink   Claustophobic Unknown --/-- --  yes   Homemaker. --  --/-- --  --    lives with spouse --  --/-- --  --    Living will in place --  --/-- --  --    . --  --/-- --  --    Recreational Drug Use Never --/-- --  no   Tobacco Never --/-- --  never smoker   Unemployed. --  --/-- --  --          Review of Systems  · Constitutional  o Denies  o : fever, fatigue, weight loss, weight gain  · HENT  o Admits  o : headaches  · Cardiovascular  o Denies  o : lower extremity edema, claudication, chest pressure,  palpitations  · Respiratory  o Denies  o : shortness of breath, wheezing, cough, hemoptysis, dyspnea on exertion  · Gastrointestinal  o Denies  o : nausea, vomiting, diarrhea, constipation, abdominal pain  · Genitourinary  o Admits  o : dark urine, malodor urine  o Denies  o : frequency, dysuria, hematuria      Physical Examination  · Constitutional  o Appearance  o : well-nourished, well developed, alert, in no acute distress  · Neurologic  o Mental Status Examination  o :   § Orientation  § : grossly oriented to person, place and time  · Psychiatric  o Mood and Affect  o : mood normal, affect appropriate, denies any SI/HI          Assessment  · Migraine     346.90/G43.909  · Malodorous urine     791.9/R82.90  · Dark urine     791.9/R82.998  · Avascular necrosis     733.40/M87.9  · Arthritis     716.90/M19.90  · Hepatitis C     070.54  · HIV positive     V08/Z21  · Epilepsy     345.90/G40.909      Plan  · Orders  o ACO-39: Current medications updated and reviewed (, 1159F) - - 01/05/2021  o NEUROLOGY CONSULTATION. (NEURO) - - 01/05/2021  o Urinalysis with Reflex Microscopy if abnormal ([facility]) (86886) - - 01/05/2021  · Medications  o Medications have been Reconciled  o Transition of Care or Provider Policy  · Instructions  o Patient was educated/instructed on their diagnosis, treatment and medications prior to discharge from the clinic today.  o Patient instructed to seek medical attention urgently for new or worsening symptoms.  o Call the office with any concerns or questions.  · Disposition  o Call or Return if symptoms worsen or persist.            Electronically Signed by: RANCHO Snyder -Author on January 5, 2021 01:56:41 PM

## 2021-05-14 NOTE — PROGRESS NOTES
Progress Note      Patient Name: Janneth Montes   Patient ID: [id number]   Sex: Female   YOB: 1965    Primary Care Provider: Juli VICKERS   Referring Provider: Juli VICKERS    Visit Date: April 30, 2021    Provider: KLEBER Mandel   [facility]   Location Address: [address] [place] [address], [address]  [place], [place]  [id number]   Location Phone: [phone]          Chief Complaint  · Vomiting, diarrhea  · off balance      History Of Present Illness  Janneth Montes is a 55 year old /White female who presents for evaluation and treatment of:      Patient is here today with the complaint of vomiting and diarrhea, and off balance x 4 days. Patient states that this has been going on for about a week. She has had no fever. Patient states that she is very fatigued, and has not really ate or drank anything. Patient has taken Peptobismol OTC with no relief. Has headache. Patient denies urinary sx. C/o lower abdominal pain on right and left side. Denies blood in stool.    Last labs about 1 year ago per patient.     HIV: goes to [facility] clinic in [place]; last visit over 6 mths ago; has not been out of medication.       Past Medical History  Disease Name Date Onset Notes   Allergic rhinitis due to allergen --  --    Arthritis --  --    Avascular necrosis 09/29/2015 --    Cancer --  cervical   Diabetes Mellitus, Type II --  --    Epilepsy --  --    Essential hypertension --  --    Hepatitis C --  --    HIV positive --  --    Migraines --  --    Neck abscess --  --    Neck mass --  --    Ovary Neoplasm, Malignant --  --    Retained ureteral stent 08/10/2020 --    Skin Disease --  --    STD (female) --  --    Ureteral calculus, left 08/10/2020 --          Past Surgical History  Procedure Name Date Notes   Ankle repair 1989 --    Appendectomy 1998 --    Back surgery --  X4   Colonoscopy 5/22/13 --    endoscopy 2008 2013 --    Hysterectomy 1995 --    Liver  biopsy 2014 --    Metal implants --  metal plates, spine DDD         Medication List  Name Date Started Instructions   dapsone 100 mg oral tablet  take 1 tablet (100 mg) by oral route once daily   Descovy 200-25 mg oral tablet  take 1 tablet by oral route once daily   gabapentin 800 mg oral tablet  take 1 tablet (800 mg) by oral route 3 times per day   Maxalt 10 mg oral tablet 02/22/2021 take 1 tablet (10 mg) by oral route once, may repeat at 2 hour intervals; do not exceed 30 mg in 24 hours   Percocet  mg oral tablet  take 1 tablet by oral route every 6 hours as needed   Vitamin D2 1,250 mcg (50,000 unit) oral capsule  take 1 capsule by oral route once monthly for 90 days         Allergy List  Allergen Name Date Reaction Notes   Bactrim --  --  --    Cipro --  --  --    Flagyl --  --  --    Keflex --  --  --    SULFA (SULFONAMIDES) --  --  --        Allergies Reconciled  Family Medical History  Disease Name Relative/Age Notes   Breast Neoplasm, Malignant  --    Cervical Neoplasm, Malignant  --    Family history of certain chronic disabling diseases; arthritis Brother/  Mother/  Sister/   Mother; Sister; Brother  Mother; Father   Family history of ovarian cancer Mother/   --          Social History  Finding Status Start/Stop Quantity Notes   Alcohol Use Never --/-- --  does not drink   Claustophobic Unknown --/-- --  yes   Homemaker. --  --/-- --  --    lives with spouse --  --/-- --  --    Living will in place --  --/-- --  --    . --  --/-- --  --    Recreational Drug Use Never --/-- --  no   Tobacco Never --/-- --  never smoker   Unemployed. --  --/-- --  --          Review of Systems  · Constitutional  o Denies  o : fatigue  · Eyes  o Denies  o : blurred vision, changes in vision  · HENT  o Denies  o : headaches  · Cardiovascular  o Denies  o : chest pain, irregular heart beats, rapid heart rate, dyspnea on exertion  · Respiratory  o Denies  o : shortness of breath, wheezing,  "cough  · Gastrointestinal  o Admits  o : nausea, vomiting, diarrhea, abdominal pain  o Denies  o : constipation, blood in stools, melena  · Genitourinary  o Denies  o : frequency, dysuria, hematuria  · Integument  o Denies  o : rash, new skin lesions  · Musculoskeletal  o Denies  o : joint pain, joint swelling, muscle pain  · Endocrine  o Denies  o : polyuria, polydipsia      Vitals  Date Time BP Position Site L\R Cuff Size HR RR TEMP (F) WT  HT  BMI kg/m2 BSA m2 O2 Sat FR L/min FiO2 HC       04/30/2021 10:25 /74 Sitting    95 - R   180lbs 0oz 5'  8\" 27.37 1.98 99 %  21%          Physical Examination  · Constitutional  o Appearance  o : well developed, well-nourished, no acute distress  · Head and Face  o Head  o : normocephalic, atraumatic  · Neck  o Inspection/Palpation  o : normal appearance, no masses or tenderness, trachea midline  o Thyroid  o : gland size normal, nontender, no nodules or masses present on palpation  · Respiratory  o Respiratory Effort  o : breathing unlabored  o Inspection of Chest  o : chest rise symmetric bilaterally  o Auscultation of Lungs  o : clear to auscultation bilaterally throughout inspiration and expiration  · Cardiovascular  o Heart  o :   § Auscultation of Heart  § : regular rate and rhythm, no murmurs, gallops or rubs  o Peripheral Vascular System  o :   § Extremities  § : no edema  · Gastrointestinal  o Abdomen  o : soft, mild tenderness throughout abdomen, non-distended, + bowel sounds, no hepatosplenomegaly, no masses palpated  · Lymphatic  o Neck  o : no cervical lymphadenopathy, no supraclavicular lymphadenopathy  · Psychiatric  o Mood and Affect  o : mood normal, affect appropriate          Results  · In-Office Procedures  o Lab procedure  § IOP - Urinalysis without Microscopy ([facility]) [facility] (05926)   § Color Ur: Rina   § Clarity Ur: Clear   § Glucose Ur Ql Strip: Negative   § Bilirub Ur Ql Strip: Small   § Ketones Ur Ql Strip: Negative   § Sp Gr Ur Qn: 1.020 "   § Hgb Ur Ql Strip: Negative   § pH Ur-LsCnc: 8.5   § Prot Ur Ql Strip: 30 mg/dL   § Urobilinogen Ur Strip-mCnc: >= 8.0 E.U./dL   § Nitrite Ur Ql Strip: Negative   § WBC Est Ur Ql Strip: Negative       Assessment  · Abdominal pain     789.00/R10.9  · Diarrhea     787.91/R19.7  · HIV positive     V08/Z21  · Vomiting     787.03/R11.10       Ordered labs. Sent in rx Phenergan 25mg one po q 6 hours prn nausea and vomiting. Discussed limiting diary and BRAT diet. Patient advised to go to ER with worsening sx. Patient voiced understanding.     Problems Reconciled  Plan  · Orders  o Amylase and lipase panel (05145, 70950) - 789.00/R10.9 - 04/30/2021  o CBC with Auto Diff [facility] (91481) - 789.00/R10.9 - 04/30/2021  o CMP [facility] (54867) - 789.00/R10.9 - 04/30/2021  o ACO-39: Current medications updated and reviewed (1159F, ) - - 04/30/2021  · Medications  o promethazine 25 mg oral tablet   SIG: take 1 tablet (25 mg) by oral route every 6 hours as needed for vomiting   DISP: (20) Tablet with 0 refills  Prescribed on 04/30/2021     o Macrobid 100 mg oral capsule   SIG: take 1 capsule (100 mg) by oral route every 12 hours with food for 7 days   DISP: (14) Capsule with 0 refills  Discontinued on 04/30/2021     o Medications have been Reconciled  o Transition of Care or Provider Policy  · Instructions  o Instructed to seek medical attention urgently for new or worsening symptoms.  o BRAT diet, Avoid dairy products.  o Take all medications as prescribed/directed.  o Patient was educated/instructed on their diagnosis, treatment and medications prior to discharge from the clinic today.  o Patient instructed to seek medical attention urgently for new or worsening symptoms.  o Call the office with any concerns or questions.  o Electronically Identified Patient Education Materials Provided Electronically  · Disposition  o Call or Return if symptoms worsen or persist.  o Follow Up PRN.            Electronically Signed by: Yaneth RAMIREZ  KLEBER Torrez -Author on April 30, 2021 03:18:36 PM

## 2021-05-15 VITALS
DIASTOLIC BLOOD PRESSURE: 68 MMHG | HEART RATE: 112 BPM | BODY MASS INDEX: 28.49 KG/M2 | HEIGHT: 68 IN | WEIGHT: 188 LBS | OXYGEN SATURATION: 93 % | SYSTOLIC BLOOD PRESSURE: 157 MMHG

## 2021-05-15 VITALS
DIASTOLIC BLOOD PRESSURE: 71 MMHG | SYSTOLIC BLOOD PRESSURE: 148 MMHG | HEIGHT: 68 IN | WEIGHT: 194 LBS | BODY MASS INDEX: 29.4 KG/M2 | HEART RATE: 105 BPM

## 2021-05-15 VITALS — RESPIRATION RATE: 18 BRPM | BODY MASS INDEX: 26.75 KG/M2 | HEIGHT: 68 IN | WEIGHT: 176.5 LBS | TEMPERATURE: 98.9 F

## 2021-05-15 VITALS — BODY MASS INDEX: 27.01 KG/M2 | HEIGHT: 68 IN | TEMPERATURE: 100.4 F | RESPIRATION RATE: 16 BRPM | WEIGHT: 178.25 LBS

## 2021-05-16 VITALS — BODY MASS INDEX: 31.45 KG/M2 | WEIGHT: 207.5 LBS | HEART RATE: 98 BPM | HEIGHT: 68 IN | OXYGEN SATURATION: 95 %

## 2021-05-16 VITALS — OXYGEN SATURATION: 92 % | HEART RATE: 104 BPM | BODY MASS INDEX: 31.7 KG/M2 | WEIGHT: 209.12 LBS | HEIGHT: 68 IN

## 2021-06-10 ENCOUNTER — TELEPHONE (OUTPATIENT)
Dept: FAMILY MEDICINE CLINIC | Facility: CLINIC | Age: 56
End: 2021-06-10

## 2021-06-10 NOTE — TELEPHONE ENCOUNTER
Caller: Janneth Montes    Relationship: Self    Best call back number: [phone]       What was the call regarding:PATIENT IS WANTING A REFERRAL TO PAIN CLINIC. DIDN'T KNOW IF SHE HAD TO MAKE AN APPOINTMENT FOR THAT. PLEASE ADVISE     Do you require a callback: YES

## 2021-06-11 ENCOUNTER — TELEPHONE (OUTPATIENT)
Dept: FAMILY MEDICINE CLINIC | Facility: CLINIC | Age: 56
End: 2021-06-11

## 2021-06-11 NOTE — TELEPHONE ENCOUNTER
Caller: Janneth Montes    Relationship: Self    Best call back number: [phone]    What is the medical concern/diagnosis: PAIN MANAGEMENT, PAIN IN THE BACK, PATIENT HAS A METAL PLATE IN HER BACK.    What specialty or service is being requested: PAIN MANAGEMENT    What is the provider, practice or medical service name: [facility]    What is the office location: [address] [place] [address]    What is the office phone number: [phone]    Any additional details: PATIENT WOULD LIKE A CALLBACK.

## 2021-06-18 ENCOUNTER — OFFICE VISIT (OUTPATIENT)
Dept: FAMILY MEDICINE CLINIC | Facility: CLINIC | Age: 56
End: 2021-06-18

## 2021-06-18 VITALS
HEART RATE: 83 BPM | DIASTOLIC BLOOD PRESSURE: 59 MMHG | HEIGHT: 68 IN | SYSTOLIC BLOOD PRESSURE: 125 MMHG | BODY MASS INDEX: 26.98 KG/M2 | OXYGEN SATURATION: 96 % | WEIGHT: 178 LBS

## 2021-06-18 DIAGNOSIS — M54.42 LOW BACK PAIN WITH LEFT-SIDED SCIATICA, UNSPECIFIED BACK PAIN LATERALITY, UNSPECIFIED CHRONICITY: Primary | ICD-10-CM

## 2021-06-18 DIAGNOSIS — M54.2 CERVICAL PAIN (NECK): ICD-10-CM

## 2021-06-18 PROBLEM — J30.9 ALLERGIC RHINITIS DUE TO ALLERGEN: Status: ACTIVE | Noted: 2021-06-18

## 2021-06-18 PROBLEM — L98.9 SKIN DISEASE: Status: ACTIVE | Noted: 2021-06-18

## 2021-06-18 PROBLEM — Z79.4 ENCOUNTER FOR LONG-TERM (CURRENT) USE OF INSULIN (HCC): Status: ACTIVE | Noted: 2018-11-02

## 2021-06-18 PROBLEM — M79.2 PERIPHERAL NEUROPATHIC PAIN: Status: ACTIVE | Noted: 2020-02-12

## 2021-06-18 PROBLEM — E11.9 DIABETES MELLITUS, TYPE II (HCC): Status: ACTIVE | Noted: 2021-06-18

## 2021-06-18 PROBLEM — M19.90 ARTHRITIS: Status: ACTIVE | Noted: 2021-06-18

## 2021-06-18 PROBLEM — I10 ESSENTIAL HYPERTENSION: Status: ACTIVE | Noted: 2021-06-18

## 2021-06-18 PROBLEM — B19.20 HEPATITIS C: Status: ACTIVE | Noted: 2021-06-18

## 2021-06-18 PROBLEM — G43.909 MIGRAINES: Status: ACTIVE | Noted: 2021-06-18

## 2021-06-18 PROBLEM — Z21 HIV POSITIVE: Status: ACTIVE | Noted: 2021-06-18

## 2021-06-18 PROBLEM — G40.909 EPILEPSY (HCC): Status: ACTIVE | Noted: 2021-06-18

## 2021-06-18 PROCEDURE — 99213 OFFICE O/P EST LOW 20 MIN: CPT | Performed by: NURSE PRACTITIONER

## 2021-06-18 RX ORDER — DAPSONE 100 MG/1
1 TABLET ORAL DAILY
COMMUNITY
End: 2022-01-01

## 2021-06-18 RX ORDER — PROMETHAZINE HYDROCHLORIDE 25 MG/1
1 TABLET ORAL EVERY 6 HOURS PRN
COMMUNITY
Start: 2021-04-30 | End: 2021-09-01 | Stop reason: SDUPTHER

## 2021-06-18 RX ORDER — OXYCODONE AND ACETAMINOPHEN 10; 325 MG/1; MG/1
1 TABLET ORAL EVERY 6 HOURS PRN
COMMUNITY
End: 2021-09-01

## 2021-06-18 RX ORDER — GABAPENTIN 800 MG/1
800 TABLET ORAL 3 TIMES DAILY
COMMUNITY
Start: 2021-05-23 | End: 2021-09-01

## 2021-06-18 RX ORDER — ERGOCALCIFEROL 1.25 MG/1
1 CAPSULE ORAL WEEKLY
COMMUNITY
End: 2022-01-01

## 2021-06-18 RX ORDER — EMTRICITABINE AND TENOFOVIR ALAFENAMIDE 200; 25 MG/1; MG/1
1 TABLET ORAL DAILY
COMMUNITY
End: 2021-06-18 | Stop reason: ALTCHOICE

## 2021-06-18 RX ORDER — RIZATRIPTAN BENZOATE 10 MG/1
1 TABLET ORAL DAILY PRN
COMMUNITY
Start: 2021-05-29 | End: 2021-09-01 | Stop reason: SDUPTHER

## 2021-06-18 NOTE — PROGRESS NOTES
"Chief Complaint  Pain (Referral to pain management)    Subjective          Janneth Montes presents to [facility] for a pain management referral. Patient says she was discharged form [facility] due to disagreement with a doctor and would like to go to [facility].  She is requesting a pain management referral for chronic back pain-she has had 4 back surgeries in the past-she has a metal plate in her back. She was being prescribed Percocet 10-325mg q 6 hours and neurontin 800mg tid. States she was released from [facility]. Called [facility] and they state she was discharged from the practice due to testing positive for Fentanyl numerous times-she denies use of Fentanyl states she does not even know what that is. States she will run out of her medication on Tuesday. She talked to [facility] and states they are willing to take her as a patient as long as she gets a referral. Referral placed.     Reconciled medications-states she was switched from Descovy to Biktarvy due to her low CD4 counts-she is going to the [address] [place] [address] in [place] for management. Med list updated.       She  has a past medical history of Allergic rhinitis due to allergen, Arthritis, Avascular necrosis (CMS/HCC) (09/29/2015), Cervical cancer (CMS/HCC), Claustrophobia, Diabetes mellitus, type 2 (CMS/HCC), Epilepsy (CMS/HCC), Essential hypertension, Hepatitis C, HIV positive (CMS/[facility]), Migraines, Neck abscess, Neck mass, Ovary cancer (CMS/HCC), Retained ureteral stent (08/10/2020), Skin disease, STD (female), and Ureteral calculus, left (08/10/2020).     Objective   Vital Signs:   /59 (BP Location: Left arm, Patient Position: Sitting, Cuff Size: Adult)   Pulse 83   Ht 172.7 cm (68\")   Wt 80.7 kg (178 lb)   SpO2 96%   BMI 27.06 kg/m²     Physical Exam  Constitutional:       Appearance: Normal appearance.   Cardiovascular:      Rate and Rhythm: Normal rate and regular rhythm. "      Pulses: Normal pulses.      Heart sounds: Normal heart sounds.   Pulmonary:      Effort: Pulmonary effort is normal.      Breath sounds: Normal breath sounds.   Skin:     General: Skin is warm and dry.   Neurological:      General: No focal deficit present.      Mental Status: She is alert and oriented to person, place, and time.   Psychiatric:         Mood and Affect: Mood normal.         Behavior: Behavior normal.        Result Review :   {The following data was reviewed by          Assessment and Plan    There are no diagnoses linked to this encounter.    Follow Up   No follow-ups on file.  Patient was given instructions and counseling regarding her condition or for health maintenance advice. Please see specific information pulled into the AVS if appropriate.     Jannethmichael Montes  reports that she has never smoked. She has never used smokeless tobacco.

## 2021-08-31 RX ORDER — AMITRIPTYLINE HYDROCHLORIDE 10 MG/1
10 TABLET, FILM COATED ORAL
COMMUNITY
Start: 2021-06-19 | End: 2022-01-01

## 2021-09-01 ENCOUNTER — TELEMEDICINE (OUTPATIENT)
Dept: FAMILY MEDICINE CLINIC | Facility: CLINIC | Age: 56
End: 2021-09-01

## 2021-09-01 DIAGNOSIS — R05.9 COUGH: Primary | ICD-10-CM

## 2021-09-01 DIAGNOSIS — R50.9 FEVER, UNSPECIFIED FEVER CAUSE: ICD-10-CM

## 2021-09-01 DIAGNOSIS — R11.2 NAUSEA AND VOMITING, INTRACTABILITY OF VOMITING NOT SPECIFIED, UNSPECIFIED VOMITING TYPE: ICD-10-CM

## 2021-09-01 DIAGNOSIS — R68.83 CHILLS: ICD-10-CM

## 2021-09-01 DIAGNOSIS — G43.809 OTHER MIGRAINE WITHOUT STATUS MIGRAINOSUS, NOT INTRACTABLE: ICD-10-CM

## 2021-09-01 DIAGNOSIS — H92.09 EARACHE: ICD-10-CM

## 2021-09-01 DIAGNOSIS — J02.9 SORE THROAT: ICD-10-CM

## 2021-09-01 PROCEDURE — 99213 OFFICE O/P EST LOW 20 MIN: CPT | Performed by: NURSE PRACTITIONER

## 2021-09-01 PROCEDURE — U0005 INFEC AGEN DETEC AMPLI PROBE: HCPCS | Performed by: NURSE PRACTITIONER

## 2021-09-01 PROCEDURE — U0003 INFECTIOUS AGENT DETECTION BY NUCLEIC ACID (DNA OR RNA); SEVERE ACUTE RESPIRATORY SYNDROME CORONAVIRUS 2 (SARS-COV-2) (CORONAVIRUS DISEASE [COVID-19]), AMPLIFIED PROBE TECHNIQUE, MAKING USE OF HIGH THROUGHPUT TECHNOLOGIES AS DESCRIBED BY CMS-2020-01-R: HCPCS | Performed by: NURSE PRACTITIONER

## 2021-09-01 RX ORDER — RIZATRIPTAN BENZOATE 10 MG/1
10 TABLET ORAL DAILY PRN
Qty: 12 TABLET | Refills: 0 | Status: SHIPPED | OUTPATIENT
Start: 2021-09-01 | End: 2021-10-12 | Stop reason: SDUPTHER

## 2021-09-01 RX ORDER — PROMETHAZINE HYDROCHLORIDE 25 MG/1
25 TABLET ORAL EVERY 6 HOURS PRN
Qty: 30 TABLET | Refills: 0 | Status: SHIPPED | OUTPATIENT
Start: 2021-09-01 | End: 2022-01-01

## 2021-09-01 NOTE — PROGRESS NOTES
You have chosen to receive care through a telehealth visit.  Do you consent to use a video/audio connection for your medical care today? YES    Chief Complaint  Fever, Chills, Vomiting, Diarrhea, Cough, Sore Throat, and Earache    Subjective            Janneth Montes presents to [facility]  Pt has c/o cough, fever, chills, sore throat, earaches, , nausea and vomiting. Pt states this has been going on since 8/27/21. Pt states fever is staying around 100-101. Pt has tried taking tylenol and cold medicine with no relief. Pt claims she has not been able to hold anything down. Pt would like a refill on promethazine to help with this sent to [facility]. She would also like a refill on her Maxlat for migraines.        [facility]  Past Medical History:   Diagnosis Date   • Allergic rhinitis due to allergen    • Arthritis    • Avascular necrosis (CMS/HCC) 09/29/2015   • Cervical cancer (CMS/HCC)    • Claustrophobia    • Diabetes mellitus, type 2 (CMS/HCC)    • Epilepsy (CMS/HCC)    • Essential hypertension    • Hepatitis C    • HIV positive (CMS/HCC)    • Migraines    • Neck abscess    • Neck mass    • Ovary cancer (CMS/HCC)    • Retained ureteral stent 08/10/2020   • Skin disease    • STD (female)    • Ureteral calculus, left 08/10/2020       ALLERGY  Allergies   Allergen Reactions   • Ciprofloxacin Hives   • Sulfate Hives   • Cephalexin Rash   • Metronidazole Rash   • Sulfamethoxazole-Trimethoprim Rash        SURGICALHX  Past Surgical History:   Procedure Laterality Date   • ANKLE ARTHROSCOPY W/ OPEN REPAIR  1989   • APPENDECTOMY  1998   • BACK SURGERY      X4   • COLONOSCOPY  05/22/2013   • HYSTERECTOMY  1995   • LIVER BIOPSY  2014   • OTHER SURGICAL HISTORY      METAL PLATES; SPINE DDD        SOCX  Social History     Tobacco Use   • Smoking status: Never Smoker   • Smokeless tobacco: Never Used   Substance Use Topics   • Alcohol use: Never       FAMHX  Family History   Problem Relation  Age of Onset   • Ovarian cancer Mother    • Arthritis Mother    • Arthritis Sister    • Arthritis Brother    • Breast cancer Other    • Cervical cancer Other         MEDSIGONLY  Current Outpatient Medications on File Prior to Visit   Medication Sig   • amitriptyline (ELAVIL) 10 MG tablet Take 10 mg by mouth every night at bedtime.   • Bictegravir-Emtricitab-Tenofov (BIKTARVY) -25 MG per tablet Take 1 tablet by mouth Daily.   • dapsone 100 MG tablet Take 1 tablet by mouth Daily.   • ergocalciferol (ERGOCALCIFEROL) 1.25 MG (40719 UT) capsule Take 1 capsule by mouth 1 (One) Time Per Week.   • [DISCONTINUED] promethazine (PHENERGAN) 25 MG tablet Take 1 tablet by mouth Every 6 (Six) Hours As Needed.   • [DISCONTINUED] rizatriptan (MAXALT) 10 MG tablet Take 1 tablet by mouth Daily As Needed.   • [DISCONTINUED] gabapentin (NEURONTIN) 800 MG tablet Take 800 mg by mouth 3 (Three) Times a Day.   • [DISCONTINUED] oxyCODONE-acetaminophen (PERCOCET)  MG per tablet Take 1 tablet by mouth Every 6 (Six) Hours As Needed.     No current facility-administered medications on file prior to visit.       Health Maintenance Due   Topic Date Due   • URINE MICROALBUMIN  Never done   • Pneumococcal Vaccine 0-64 (1 of 4 - PCV13) Never done   • TDAP/TD VACCINES (1 - Tdap) Never done   • ZOSTER VACCINE (1 of 2) Never done   • MAMMOGRAM  12/10/2016   • ANNUAL WELLNESS VISIT  Never done   • DIABETIC FOOT EXAM  Never done   • HEMOGLOBIN A1C  Never done   • DIABETIC EYE EXAM  Never done   • COVID-19 Vaccine (2 - Pfizer risk 3-dose series) 07/13/2021       Objective     There were no vitals taken for this visit.      Physical Exam  Psychiatric:         Attention and Perception: Attention normal.         Mood and Affect: Mood normal.         Speech: Speech normal.         Behavior: Behavior normal. Behavior is cooperative.         Thought Content: Thought content normal.         Cognition and Memory: Cognition normal.         Judgment:  Judgment normal.           Result Review :                           Assessment and Plan        Diagnoses and all orders for this visit:    1. Cough (Primary)  -     COVID PRE-OP / PRE-PROCEDURE SCREENING ORDER (NO ISOLATION) - Swab, Nasopharynx; Future    2. Nausea and vomiting, intractability of vomiting not specified, unspecified vomiting type  -     promethazine (PHENERGAN) 25 MG tablet; Take 1 tablet by mouth Every 6 (Six) Hours As Needed for Vomiting.  Dispense: 30 tablet; Refill: 0    3. Fever, unspecified fever cause  -     COVID PRE-OP / PRE-PROCEDURE SCREENING ORDER (NO ISOLATION) - Swab, Nasopharynx; Future    4. Chills  -     COVID PRE-OP / PRE-PROCEDURE SCREENING ORDER (NO ISOLATION) - Swab, Nasopharynx; Future    5. Sore throat  -     COVID PRE-OP / PRE-PROCEDURE SCREENING ORDER (NO ISOLATION) - Swab, Nasopharynx; Future    6. Earache  -     COVID PRE-OP / PRE-PROCEDURE SCREENING ORDER (NO ISOLATION) - Swab, Nasopharynx; Future    7. Other migraine without status migrainosus, not intractable  -     rizatriptan (MAXALT) 10 MG tablet; Take 1 tablet by mouth Daily As Needed (migraine).  Dispense: 12 tablet; Refill: 0      Pt to come by office for covid swab.  Advised to push fluids.        Follow Up     Return if symptoms worsen or fail to improve.    Patient was given instructions and counseling regarding her condition or for health maintenance advice. Please see specific information pulled into the AVS if appropriate.     Janneth Laz Jyoti  reports that she has never smoked. She has never used smokeless tobacco..

## 2021-09-02 ENCOUNTER — TELEPHONE (OUTPATIENT)
Dept: FAMILY MEDICINE CLINIC | Facility: CLINIC | Age: 56
End: 2021-09-02

## 2021-09-02 LAB — SARS-COV-2 RNA RESP QL NAA+PROBE: DETECTED

## 2021-09-02 NOTE — TELEPHONE ENCOUNTER
----- Message from RANCHO Snyder sent at 9/2/2021  3:08 PM EDT -----  Positive covid must quarantine for 10 days, report to ER with any SOA or trouble breathing

## 2021-10-12 ENCOUNTER — TELEPHONE (OUTPATIENT)
Dept: FAMILY MEDICINE CLINIC | Facility: CLINIC | Age: 56
End: 2021-10-12

## 2021-10-12 DIAGNOSIS — G43.809 OTHER MIGRAINE WITHOUT STATUS MIGRAINOSUS, NOT INTRACTABLE: ICD-10-CM

## 2021-10-12 RX ORDER — RIZATRIPTAN BENZOATE 10 MG/1
10 TABLET ORAL DAILY PRN
Qty: 9 TABLET | Refills: 0 | Status: SHIPPED | OUTPATIENT
Start: 2021-10-12 | End: 2022-01-01

## 2021-10-12 NOTE — TELEPHONE ENCOUNTER
Will send one RF in. Spoke with pt. Pt is scheduling f/u appt. Pt has not been seen for maintenance/check up in almost one yr.

## 2021-10-12 NOTE — TELEPHONE ENCOUNTER
Caller: Janneth Montes    Relationship: Self     Medication requested (name and dosage): rizatriptan (MAXALT) 10 MG tablet    Pharmacy where request should be sent: [id number] - [place], [place] - [address] AT [facility] - [phone] [facility] [phone]     Additional details provided by patient: PATIENT STATED THAT SHE HAS BEEN OUT OF MEDICATION FOR 2 DAYS AND THAT PHARMACY HAD SENT REQUEST AS WELL     Best call back number: [phone]    Does the patient have less than a 3 day supply:  [x] Yes  [] No    Nate VELASQUEZ Rep   10/12/21 11:16 EDT

## 2022-01-01 ENCOUNTER — TELEPHONE (OUTPATIENT)
Dept: FAMILY MEDICINE CLINIC | Facility: CLINIC | Age: 57
End: 2022-01-01

## 2022-01-01 ENCOUNTER — READMISSION MANAGEMENT (OUTPATIENT)
Dept: CALL CENTER | Facility: HOSPITAL | Age: 57
End: 2022-01-01

## 2022-01-01 ENCOUNTER — TRANSITIONAL CARE MANAGEMENT TELEPHONE ENCOUNTER (OUTPATIENT)
Dept: CALL CENTER | Facility: HOSPITAL | Age: 57
End: 2022-01-01

## 2022-01-01 ENCOUNTER — LAB REQUISITION (OUTPATIENT)
Dept: LAB | Facility: HOSPITAL | Age: 57
End: 2022-01-01

## 2022-01-01 ENCOUNTER — APPOINTMENT (OUTPATIENT)
Dept: MAMMOGRAPHY | Facility: HOSPITAL | Age: 57
End: 2022-01-01

## 2022-01-01 ENCOUNTER — APPOINTMENT (OUTPATIENT)
Dept: GENERAL RADIOLOGY | Facility: HOSPITAL | Age: 57
End: 2022-01-01

## 2022-01-01 ENCOUNTER — APPOINTMENT (OUTPATIENT)
Dept: CT IMAGING | Facility: HOSPITAL | Age: 57
End: 2022-01-01

## 2022-01-01 ENCOUNTER — HOSPITAL ENCOUNTER (INPATIENT)
Facility: HOSPITAL | Age: 57
LOS: 3 days | Discharge: HOME HEALTH | End: 2022-02-07
Attending: EMERGENCY MEDICINE | Admitting: HOSPITALIST

## 2022-01-01 ENCOUNTER — OFFICE VISIT (OUTPATIENT)
Dept: FAMILY MEDICINE CLINIC | Facility: CLINIC | Age: 57
End: 2022-01-01

## 2022-01-01 ENCOUNTER — TELEPHONE (OUTPATIENT)
Dept: ORTHOPEDIC SURGERY | Facility: CLINIC | Age: 57
End: 2022-01-01

## 2022-01-01 ENCOUNTER — HOSPITAL ENCOUNTER (INPATIENT)
Facility: HOSPITAL | Age: 57
LOS: 1 days | Discharge: ANOTHER HOSPITAL | End: 2022-05-14
Attending: EMERGENCY MEDICINE | Admitting: INTERNAL MEDICINE

## 2022-01-01 ENCOUNTER — OFFICE VISIT (OUTPATIENT)
Dept: ORTHOPEDIC SURGERY | Facility: CLINIC | Age: 57
End: 2022-01-01

## 2022-01-01 ENCOUNTER — APPOINTMENT (OUTPATIENT)
Dept: CARDIOLOGY | Facility: HOSPITAL | Age: 57
End: 2022-01-01

## 2022-01-01 ENCOUNTER — ANESTHESIA (OUTPATIENT)
Dept: PERIOP | Facility: HOSPITAL | Age: 57
End: 2022-01-01

## 2022-01-01 ENCOUNTER — HOSPITAL ENCOUNTER (INPATIENT)
Facility: HOSPITAL | Age: 57
LOS: 1 days | Discharge: HOSPICE | End: 2022-07-01
Attending: EMERGENCY MEDICINE | Admitting: HOSPITALIST

## 2022-01-01 ENCOUNTER — ANESTHESIA EVENT (OUTPATIENT)
Dept: PERIOP | Facility: HOSPITAL | Age: 57
End: 2022-01-01

## 2022-01-01 VITALS
BODY MASS INDEX: 22.32 KG/M2 | OXYGEN SATURATION: 100 % | HEART RATE: 84 BPM | RESPIRATION RATE: 18 BRPM | TEMPERATURE: 97.7 F | HEIGHT: 64 IN | WEIGHT: 130.73 LBS | SYSTOLIC BLOOD PRESSURE: 130 MMHG | DIASTOLIC BLOOD PRESSURE: 70 MMHG

## 2022-01-01 VITALS
DIASTOLIC BLOOD PRESSURE: 72 MMHG | OXYGEN SATURATION: 93 % | TEMPERATURE: 98.2 F | HEART RATE: 97 BPM | BODY MASS INDEX: 26.2 KG/M2 | HEIGHT: 68 IN | RESPIRATION RATE: 23 BRPM | SYSTOLIC BLOOD PRESSURE: 137 MMHG | WEIGHT: 172.84 LBS

## 2022-01-01 VITALS
HEART RATE: 84 BPM | WEIGHT: 158.29 LBS | SYSTOLIC BLOOD PRESSURE: 114 MMHG | OXYGEN SATURATION: 99 % | RESPIRATION RATE: 18 BRPM | DIASTOLIC BLOOD PRESSURE: 56 MMHG | TEMPERATURE: 98.2 F | HEIGHT: 68 IN | BODY MASS INDEX: 23.99 KG/M2

## 2022-01-01 VITALS
OXYGEN SATURATION: 96 % | SYSTOLIC BLOOD PRESSURE: 125 MMHG | HEART RATE: 103 BPM | WEIGHT: 153 LBS | SYSTOLIC BLOOD PRESSURE: 125 MMHG | DIASTOLIC BLOOD PRESSURE: 65 MMHG | HEIGHT: 68 IN | WEIGHT: 153 LBS | OXYGEN SATURATION: 96 % | HEIGHT: 68 IN | DIASTOLIC BLOOD PRESSURE: 65 MMHG | BODY MASS INDEX: 23.19 KG/M2 | BODY MASS INDEX: 23.19 KG/M2 | HEART RATE: 103 BPM

## 2022-01-01 VITALS — OXYGEN SATURATION: 96 % | HEART RATE: 114 BPM | BODY MASS INDEX: 23.95 KG/M2 | HEIGHT: 68 IN | WEIGHT: 158 LBS

## 2022-01-01 VITALS — BODY MASS INDEX: 23.19 KG/M2 | HEIGHT: 68 IN | OXYGEN SATURATION: 97 % | HEART RATE: 121 BPM | WEIGHT: 153 LBS

## 2022-01-01 DIAGNOSIS — M25.552 LEFT HIP PAIN: Primary | ICD-10-CM

## 2022-01-01 DIAGNOSIS — S72.002A CLOSED FRACTURE OF NECK OF LEFT FEMUR, INITIAL ENCOUNTER: Primary | ICD-10-CM

## 2022-01-01 DIAGNOSIS — Z87.01 H/O PNEUMOCYSTIS PNEUMONIA: ICD-10-CM

## 2022-01-01 DIAGNOSIS — A41.9 SEPSIS, DUE TO UNSPECIFIED ORGANISM, UNSPECIFIED WHETHER ACUTE ORGAN DYSFUNCTION PRESENT: Primary | ICD-10-CM

## 2022-01-01 DIAGNOSIS — R13.12 OROPHARYNGEAL DYSPHAGIA: ICD-10-CM

## 2022-01-01 DIAGNOSIS — Z00.00 ENCOUNTER FOR GENERAL ADULT MEDICAL EXAMINATION WITHOUT ABNORMAL FINDINGS: ICD-10-CM

## 2022-01-01 DIAGNOSIS — B18.2 CHRONIC HEPATITIS C WITH HEPATIC COMA: ICD-10-CM

## 2022-01-01 DIAGNOSIS — R26.2 DIFFICULTY WALKING: ICD-10-CM

## 2022-01-01 DIAGNOSIS — N39.0 ACUTE UTI: ICD-10-CM

## 2022-01-01 DIAGNOSIS — Z12.31 ENCOUNTER FOR SCREENING MAMMOGRAM FOR MALIGNANT NEOPLASM OF BREAST: ICD-10-CM

## 2022-01-01 DIAGNOSIS — K76.82 HEPATIC ENCEPHALOPATHY: ICD-10-CM

## 2022-01-01 DIAGNOSIS — S72.002A CLOSED FRACTURE OF LEFT HIP, INITIAL ENCOUNTER: ICD-10-CM

## 2022-01-01 DIAGNOSIS — Z47.89 AFTERCARE FOLLOWING SURGERY OF THE MUSCULOSKELETAL SYSTEM: Primary | ICD-10-CM

## 2022-01-01 DIAGNOSIS — R09.02 HYPOXIA: ICD-10-CM

## 2022-01-01 DIAGNOSIS — J18.9 MULTIFOCAL PNEUMONIA: Primary | ICD-10-CM

## 2022-01-01 DIAGNOSIS — S72.8X2D OTHER CLOSED FRACTURE OF LEFT FEMUR WITH ROUTINE HEALING, UNSPECIFIED PORTION OF FEMUR, SUBSEQUENT ENCOUNTER: ICD-10-CM

## 2022-01-01 DIAGNOSIS — R47.02 DYSPHASIA: Primary | ICD-10-CM

## 2022-01-01 DIAGNOSIS — G43.801 OTHER MIGRAINE WITH STATUS MIGRAINOSUS, NOT INTRACTABLE: ICD-10-CM

## 2022-01-01 DIAGNOSIS — S72.002A CLOSED FRACTURE OF LEFT HIP, INITIAL ENCOUNTER: Primary | ICD-10-CM

## 2022-01-01 DIAGNOSIS — Z47.89 AFTERCARE FOLLOWING SURGERY OF THE MUSCULOSKELETAL SYSTEM: ICD-10-CM

## 2022-01-01 DIAGNOSIS — Z21 HIV POSITIVE: ICD-10-CM

## 2022-01-01 DIAGNOSIS — Z00.00 MEDICARE ANNUAL WELLNESS VISIT, SUBSEQUENT: Primary | ICD-10-CM

## 2022-01-01 DIAGNOSIS — M25.552 LEFT HIP PAIN: ICD-10-CM

## 2022-01-01 DIAGNOSIS — E11.649 TYPE 2 DIABETES MELLITUS WITH HYPOGLYCEMIA WITHOUT COMA, WITHOUT LONG-TERM CURRENT USE OF INSULIN: ICD-10-CM

## 2022-01-01 DIAGNOSIS — Z78.9 DECREASED ACTIVITIES OF DAILY LIVING (ADL): ICD-10-CM

## 2022-01-01 LAB
ALBUMIN SERPL-MCNC: 1.8 G/DL (ref 3.5–5.2)
ALBUMIN SERPL-MCNC: 1.8 G/DL (ref 3.5–5.2)
ALBUMIN SERPL-MCNC: 1.9 G/DL (ref 3.5–5.2)
ALBUMIN SERPL-MCNC: 2.3 G/DL (ref 3.5–5.2)
ALBUMIN SERPL-MCNC: 2.4 G/DL (ref 3.5–5.2)
ALBUMIN SERPL-MCNC: 2.4 G/DL (ref 3.5–5.2)
ALBUMIN SERPL-MCNC: 2.7 G/DL (ref 3.5–5.2)
ALBUMIN/GLOB SERPL: 0.4 G/DL
ALBUMIN/GLOB SERPL: 0.4 G/DL
ALBUMIN/GLOB SERPL: 0.5 G/DL
ALBUMIN/GLOB SERPL: 0.6 G/DL
ALP SERPL-CCNC: 103 U/L (ref 39–117)
ALP SERPL-CCNC: 111 U/L (ref 39–117)
ALP SERPL-CCNC: 128 U/L (ref 39–117)
ALP SERPL-CCNC: 136 U/L (ref 39–117)
ALP SERPL-CCNC: 162 U/L (ref 39–117)
ALP SERPL-CCNC: 87 U/L (ref 39–117)
ALT SERPL W P-5'-P-CCNC: 11 U/L (ref 1–33)
ALT SERPL W P-5'-P-CCNC: 19 U/L (ref 1–33)
ALT SERPL W P-5'-P-CCNC: 23 U/L (ref 1–33)
ALT SERPL W P-5'-P-CCNC: 36 U/L (ref 1–33)
ALT SERPL W P-5'-P-CCNC: 40 U/L (ref 1–33)
ALT SERPL W P-5'-P-CCNC: 44 U/L (ref 1–33)
AMMONIA BLD-SCNC: 158 UMOL/L (ref 11–51)
AMMONIA BLD-SCNC: 223 UMOL/L (ref 11–51)
AMPHET+METHAMPHET UR QL: NEGATIVE
ANION GAP SERPL CALCULATED.3IONS-SCNC: 10.5 MMOL/L (ref 5–15)
ANION GAP SERPL CALCULATED.3IONS-SCNC: 10.8 MMOL/L (ref 5–15)
ANION GAP SERPL CALCULATED.3IONS-SCNC: 11.2 MMOL/L (ref 5–15)
ANION GAP SERPL CALCULATED.3IONS-SCNC: 11.3 MMOL/L (ref 5–15)
ANION GAP SERPL CALCULATED.3IONS-SCNC: 12.2 MMOL/L (ref 5–15)
ANION GAP SERPL CALCULATED.3IONS-SCNC: 15.2 MMOL/L (ref 5–15)
ANION GAP SERPL CALCULATED.3IONS-SCNC: 17.7 MMOL/L (ref 5–15)
ANION GAP SERPL CALCULATED.3IONS-SCNC: 8.2 MMOL/L (ref 5–15)
ANION GAP SERPL CALCULATED.3IONS-SCNC: 8.6 MMOL/L (ref 5–15)
ANION GAP SERPL CALCULATED.3IONS-SCNC: 9.2 MMOL/L (ref 5–15)
ANISOCYTOSIS BLD QL: NORMAL
ARTERIAL PATENCY WRIST A: ABNORMAL
ARTERIAL PATENCY WRIST A: POSITIVE
AST SERPL-CCNC: 29 U/L (ref 1–32)
AST SERPL-CCNC: 33 U/L (ref 1–32)
AST SERPL-CCNC: 42 U/L (ref 1–32)
AST SERPL-CCNC: 45 U/L (ref 1–32)
AST SERPL-CCNC: 48 U/L (ref 1–32)
AST SERPL-CCNC: 52 U/L (ref 1–32)
BACTERIA SPEC AEROBE CULT: ABNORMAL
BACTERIA SPEC AEROBE CULT: NO GROWTH
BACTERIA SPEC AEROBE CULT: NORMAL
BACTERIA UR QL AUTO: ABNORMAL /HPF
BACTERIA UR QL AUTO: ABNORMAL /HPF
BARBITURATES UR QL SCN: NEGATIVE
BASE EXCESS BLDA CALC-SCNC: -1.7 MMOL/L (ref -2–2)
BASE EXCESS BLDA CALC-SCNC: 1.2 MMOL/L (ref -2–2)
BASOPHILS # BLD AUTO: 0.01 10*3/MM3 (ref 0–0.2)
BASOPHILS # BLD AUTO: 0.03 10*3/MM3 (ref 0–0.2)
BASOPHILS # BLD AUTO: 0.04 10*3/MM3 (ref 0–0.2)
BASOPHILS # BLD AUTO: 0.06 10*3/MM3 (ref 0–0.2)
BASOPHILS # BLD AUTO: 0.06 10*3/MM3 (ref 0–0.2)
BASOPHILS NFR BLD AUTO: 0.1 % (ref 0–1.5)
BASOPHILS NFR BLD AUTO: 0.1 % (ref 0–1.5)
BASOPHILS NFR BLD AUTO: 0.2 % (ref 0–1.5)
BASOPHILS NFR BLD AUTO: 0.4 % (ref 0–1.5)
BASOPHILS NFR BLD AUTO: 0.5 % (ref 0–1.5)
BASOPHILS NFR BLD AUTO: 0.9 % (ref 0–1.5)
BASOPHILS NFR BLD AUTO: 1.1 % (ref 0–1.5)
BDY SITE: ABNORMAL
BDY SITE: ABNORMAL
BENZODIAZ UR QL SCN: NEGATIVE
BH CV ECHO MEAS - AO ROOT DIAM: 3 CM
BH CV ECHO MEAS - EDV(MOD-SP2): 48 ML
BH CV ECHO MEAS - EDV(MOD-SP4): 68 ML
BH CV ECHO MEAS - ESV(MOD-SP2): 28 ML
BH CV ECHO MEAS - ESV(MOD-SP4): 37 ML
BH CV ECHO MEAS - IVSD: 1.2 CM
BH CV ECHO MEAS - LA DIMENSION(2D): 2.8 CM
BH CV ECHO MEAS - LAT PEAK E' VEL: 8.5 CM/SEC
BH CV ECHO MEAS - LVIDD: 4.6 CM
BH CV ECHO MEAS - LVIDS: 3.5 CM
BH CV ECHO MEAS - LVOT DIAM: 2 CM
BH CV ECHO MEAS - LVPWD: 1.1 CM
BH CV ECHO MEAS - MED PEAK E' VEL: 7.18 CM/SEC
BH CV ECHO MEAS - MV A MAX VEL: 75 CM/SEC
BH CV ECHO MEAS - MV DEC TIME: 206 MSEC
BH CV ECHO MEAS - MV E MAX VEL: 79 CM/SEC
BH CV ECHO MEAS - MV E/A: 1.1
BH CV ECHO MEAS - RAP SYSTOLE: 3 MMHG
BH CV ECHO MEAS - RVDD: 2.3 CM
BH CV ECHO MEAS - RVSP: 21 MMHG
BH CV ECHO MEAS - TR MAX PG: 18 MMHG
BH CV ECHO MEAS - TR MAX VEL: 212 CM/SEC
BH CV ECHO MEASUREMENTS AVERAGE E/E' RATIO: 10.08
BILIRUB CONJ SERPL-MCNC: 0.5 MG/DL (ref 0–0.3)
BILIRUB CONJ SERPL-MCNC: 0.5 MG/DL (ref 0–0.3)
BILIRUB INDIRECT SERPL-MCNC: 0.8 MG/DL
BILIRUB INDIRECT SERPL-MCNC: 0.8 MG/DL
BILIRUB SERPL-MCNC: 0.8 MG/DL (ref 0–1.2)
BILIRUB SERPL-MCNC: 1.3 MG/DL (ref 0–1.2)
BILIRUB SERPL-MCNC: 1.3 MG/DL (ref 0–1.2)
BILIRUB SERPL-MCNC: 1.4 MG/DL (ref 0–1.2)
BILIRUB SERPL-MCNC: 1.6 MG/DL (ref 0–1.2)
BILIRUB SERPL-MCNC: 1.9 MG/DL (ref 0–1.2)
BILIRUB UR QL STRIP: NEGATIVE
BUN SERPL-MCNC: 12 MG/DL (ref 6–20)
BUN SERPL-MCNC: 17 MG/DL (ref 6–20)
BUN SERPL-MCNC: 18 MG/DL (ref 6–20)
BUN SERPL-MCNC: 20 MG/DL (ref 6–20)
BUN SERPL-MCNC: 20 MG/DL (ref 6–20)
BUN SERPL-MCNC: 29 MG/DL (ref 6–20)
BUN SERPL-MCNC: 29 MG/DL (ref 6–20)
BUN SERPL-MCNC: 30 MG/DL (ref 6–20)
BUN SERPL-MCNC: 37 MG/DL (ref 6–20)
BUN SERPL-MCNC: 40 MG/DL (ref 6–20)
BUN/CREAT SERPL: 19.5 (ref 7–25)
BUN/CREAT SERPL: 20.7 (ref 7–25)
BUN/CREAT SERPL: 23.1 (ref 7–25)
BUN/CREAT SERPL: 24.4 (ref 7–25)
BUN/CREAT SERPL: 26.6 (ref 7–25)
BUN/CREAT SERPL: 29 (ref 7–25)
BUN/CREAT SERPL: 32.2 (ref 7–25)
BUN/CREAT SERPL: 32.3 (ref 7–25)
BUN/CREAT SERPL: 34.1 (ref 7–25)
BUN/CREAT SERPL: 36 (ref 7–25)
BURR CELLS BLD QL SMEAR: NORMAL
CA-I BLDA-SCNC: 1.17 MMOL/L (ref 1.13–1.32)
CA-I BLDA-SCNC: 1.18 MMOL/L (ref 1.13–1.32)
CALCIUM SPEC-SCNC: 10.5 MG/DL (ref 8.6–10.5)
CALCIUM SPEC-SCNC: 8 MG/DL (ref 8.6–10.5)
CALCIUM SPEC-SCNC: 8.1 MG/DL (ref 8.6–10.5)
CALCIUM SPEC-SCNC: 8.1 MG/DL (ref 8.6–10.5)
CALCIUM SPEC-SCNC: 8.2 MG/DL (ref 8.6–10.5)
CALCIUM SPEC-SCNC: 8.3 MG/DL (ref 8.6–10.5)
CALCIUM SPEC-SCNC: 8.3 MG/DL (ref 8.6–10.5)
CALCIUM SPEC-SCNC: 8.4 MG/DL (ref 8.6–10.5)
CALCIUM SPEC-SCNC: 8.9 MG/DL (ref 8.6–10.5)
CALCIUM SPEC-SCNC: 9.6 MG/DL (ref 8.6–10.5)
CANNABINOIDS SERPL QL: NEGATIVE
CHLORIDE BLDA-SCNC: 105 MMOL/L (ref 98–106)
CHLORIDE BLDA-SCNC: 108 MMOL/L (ref 98–106)
CHLORIDE SERPL-SCNC: 101 MMOL/L (ref 98–107)
CHLORIDE SERPL-SCNC: 105 MMOL/L (ref 98–107)
CHLORIDE SERPL-SCNC: 106 MMOL/L (ref 98–107)
CHLORIDE SERPL-SCNC: 106 MMOL/L (ref 98–107)
CHLORIDE SERPL-SCNC: 108 MMOL/L (ref 98–107)
CHLORIDE SERPL-SCNC: 110 MMOL/L (ref 98–107)
CHLORIDE SERPL-SCNC: 111 MMOL/L (ref 98–107)
CHLORIDE SERPL-SCNC: 115 MMOL/L (ref 98–107)
CHLORIDE SERPL-SCNC: 99 MMOL/L (ref 98–107)
CHLORIDE SERPL-SCNC: 99 MMOL/L (ref 98–107)
CK SERPL-CCNC: 22 U/L (ref 20–180)
CLARITY UR: ABNORMAL
CLARITY UR: ABNORMAL
CLARITY UR: CLEAR
CLUMPED PLATELETS: PRESENT
CMV DNA SERPL NAA+PROBE-ACNC: NEGATIVE IU/ML
CMV DNA SERPL NAA+PROBE-LOG IU: NORMAL {LOG_IU}/ML
CO2 SERPL-SCNC: 14.3 MMOL/L (ref 22–29)
CO2 SERPL-SCNC: 18.5 MMOL/L (ref 22–29)
CO2 SERPL-SCNC: 18.8 MMOL/L (ref 22–29)
CO2 SERPL-SCNC: 18.8 MMOL/L (ref 22–29)
CO2 SERPL-SCNC: 21.2 MMOL/L (ref 22–29)
CO2 SERPL-SCNC: 21.7 MMOL/L (ref 22–29)
CO2 SERPL-SCNC: 22.4 MMOL/L (ref 22–29)
CO2 SERPL-SCNC: 22.8 MMOL/L (ref 22–29)
CO2 SERPL-SCNC: 22.8 MMOL/L (ref 22–29)
CO2 SERPL-SCNC: 24.8 MMOL/L (ref 22–29)
COCAINE UR QL: NEGATIVE
COD CRY URNS QL: ABNORMAL /HPF
COHGB MFR BLD: 0.7 % (ref 0–1.5)
COHGB MFR BLD: 1.3 % (ref 0–1.5)
COLOR UR: YELLOW
CREAT SERPL-MCNC: 0.58 MG/DL (ref 0.57–1)
CREAT SERPL-MCNC: 0.62 MG/DL (ref 0.57–1)
CREAT SERPL-MCNC: 0.69 MG/DL (ref 0.57–1)
CREAT SERPL-MCNC: 0.78 MG/DL (ref 0.57–1)
CREAT SERPL-MCNC: 0.85 MG/DL (ref 0.57–1)
CREAT SERPL-MCNC: 0.87 MG/DL (ref 0.57–1)
CREAT SERPL-MCNC: 1.09 MG/DL (ref 0.57–1)
CREAT SERPL-MCNC: 1.11 MG/DL (ref 0.57–1)
CREAT SERPL-MCNC: 1.15 MG/DL (ref 0.57–1)
CREAT SERPL-MCNC: 1.23 MG/DL (ref 0.57–1)
D-LACTATE SERPL-SCNC: 1.9 MMOL/L (ref 0.5–2)
D-LACTATE SERPL-SCNC: 2.2 MMOL/L (ref 0.5–2)
D-LACTATE SERPL-SCNC: 2.5 MMOL/L (ref 0.5–2)
DEPRECATED RDW RBC AUTO: 54.4 FL (ref 37–54)
DEPRECATED RDW RBC AUTO: 55.3 FL (ref 37–54)
DEPRECATED RDW RBC AUTO: 56.7 FL (ref 37–54)
DEPRECATED RDW RBC AUTO: 57.5 FL (ref 37–54)
DEPRECATED RDW RBC AUTO: 66.4 FL (ref 37–54)
DEPRECATED RDW RBC AUTO: 71.5 FL (ref 37–54)
DEPRECATED RDW RBC AUTO: 73.9 FL (ref 37–54)
DEPRECATED RDW RBC AUTO: 75.7 FL (ref 37–54)
DEPRECATED RDW RBC AUTO: 77.9 FL (ref 37–54)
DEPRECATED RDW RBC AUTO: 81.5 FL (ref 37–54)
EGFRCR SERPLBLD CKD-EPI 2021: 102 ML/MIN/1.73
EGFRCR SERPLBLD CKD-EPI 2021: 104.7 ML/MIN/1.73
EGFRCR SERPLBLD CKD-EPI 2021: 51.7 ML/MIN/1.73
EGFRCR SERPLBLD CKD-EPI 2021: 56 ML/MIN/1.73
EGFRCR SERPLBLD CKD-EPI 2021: 58.5 ML/MIN/1.73
EGFRCR SERPLBLD CKD-EPI 2021: 59.7 ML/MIN/1.73
EGFRCR SERPLBLD CKD-EPI 2021: 80.5 ML/MIN/1.73
EOSINOPHIL # BLD AUTO: 0 10*3/MM3 (ref 0–0.4)
EOSINOPHIL # BLD AUTO: 0.01 10*3/MM3 (ref 0–0.4)
EOSINOPHIL # BLD AUTO: 0.01 10*3/MM3 (ref 0–0.4)
EOSINOPHIL # BLD AUTO: 0.02 10*3/MM3 (ref 0–0.4)
EOSINOPHIL # BLD AUTO: 0.03 10*3/MM3 (ref 0–0.4)
EOSINOPHIL # BLD AUTO: 0.17 10*3/MM3 (ref 0–0.4)
EOSINOPHIL # BLD AUTO: 0.43 10*3/MM3 (ref 0–0.4)
EOSINOPHIL NFR BLD AUTO: 0 % (ref 0.3–6.2)
EOSINOPHIL NFR BLD AUTO: 0.1 % (ref 0.3–6.2)
EOSINOPHIL NFR BLD AUTO: 0.1 % (ref 0.3–6.2)
EOSINOPHIL NFR BLD AUTO: 0.4 % (ref 0.3–6.2)
EOSINOPHIL NFR BLD AUTO: 0.7 % (ref 0.3–6.2)
EOSINOPHIL NFR BLD AUTO: 3.3 % (ref 0.3–6.2)
EOSINOPHIL NFR BLD AUTO: 5.9 % (ref 0.3–6.2)
ERYTHROCYTE [DISTWIDTH] IN BLOOD BY AUTOMATED COUNT: 15.8 % (ref 12.3–15.4)
ERYTHROCYTE [DISTWIDTH] IN BLOOD BY AUTOMATED COUNT: 15.9 % (ref 12.3–15.4)
ERYTHROCYTE [DISTWIDTH] IN BLOOD BY AUTOMATED COUNT: 15.9 % (ref 12.3–15.4)
ERYTHROCYTE [DISTWIDTH] IN BLOOD BY AUTOMATED COUNT: 16 % (ref 12.3–15.4)
ERYTHROCYTE [DISTWIDTH] IN BLOOD BY AUTOMATED COUNT: 18 % (ref 12.3–15.4)
ERYTHROCYTE [DISTWIDTH] IN BLOOD BY AUTOMATED COUNT: 18.8 % (ref 12.3–15.4)
ERYTHROCYTE [DISTWIDTH] IN BLOOD BY AUTOMATED COUNT: 19.5 % (ref 12.3–15.4)
ERYTHROCYTE [DISTWIDTH] IN BLOOD BY AUTOMATED COUNT: 19.5 % (ref 12.3–15.4)
ERYTHROCYTE [DISTWIDTH] IN BLOOD BY AUTOMATED COUNT: 19.7 % (ref 12.3–15.4)
ERYTHROCYTE [DISTWIDTH] IN BLOOD BY AUTOMATED COUNT: 19.9 % (ref 12.3–15.4)
ETHANOL BLD-MCNC: <10 MG/DL (ref 0–10)
ETHANOL UR QL: <0.01 %
FERRITIN SERPL-MCNC: 1520 NG/ML (ref 13–150)
FHHB: 2.5 % (ref 0–5)
FHHB: 7 % (ref 0–5)
GAS FLOW AIRWAY: 15 LPM
GAS FLOW AIRWAY: ABNORMAL L/MIN
GFR SERPL CREATININE-BSD FRML MDRD: 108 ML/MIN/1.73
GFR SERPL CREATININE-BSD FRML MDRD: 68 ML/MIN/1.73
GFR SERPL CREATININE-BSD FRML MDRD: 77 ML/MIN/1.73
GLOBULIN UR ELPH-MCNC: 4.4 GM/DL
GLOBULIN UR ELPH-MCNC: 4.4 GM/DL
GLOBULIN UR ELPH-MCNC: 4.7 GM/DL
GLOBULIN UR ELPH-MCNC: 4.7 GM/DL
GLUCOSE BLDA-MCNC: 129 MMOL/L (ref 65–99)
GLUCOSE BLDA-MCNC: 139 MMOL/L (ref 65–99)
GLUCOSE BLDC GLUCOMTR-MCNC: 100 MG/DL (ref 70–99)
GLUCOSE BLDC GLUCOMTR-MCNC: 112 MG/DL (ref 70–99)
GLUCOSE BLDC GLUCOMTR-MCNC: 117 MG/DL (ref 70–99)
GLUCOSE BLDC GLUCOMTR-MCNC: 120 MG/DL (ref 70–99)
GLUCOSE BLDC GLUCOMTR-MCNC: 127 MG/DL (ref 70–99)
GLUCOSE BLDC GLUCOMTR-MCNC: 129 MG/DL (ref 70–99)
GLUCOSE BLDC GLUCOMTR-MCNC: 145 MG/DL (ref 70–99)
GLUCOSE BLDC GLUCOMTR-MCNC: 148 MG/DL (ref 70–99)
GLUCOSE BLDC GLUCOMTR-MCNC: 149 MG/DL (ref 70–99)
GLUCOSE BLDC GLUCOMTR-MCNC: 172 MG/DL (ref 70–99)
GLUCOSE BLDC GLUCOMTR-MCNC: 174 MG/DL (ref 70–99)
GLUCOSE BLDC GLUCOMTR-MCNC: 183 MG/DL (ref 70–99)
GLUCOSE BLDC GLUCOMTR-MCNC: 216 MG/DL (ref 70–99)
GLUCOSE BLDC GLUCOMTR-MCNC: 219 MG/DL (ref 70–99)
GLUCOSE BLDC GLUCOMTR-MCNC: 231 MG/DL (ref 70–99)
GLUCOSE BLDC GLUCOMTR-MCNC: 236 MG/DL (ref 70–99)
GLUCOSE BLDC GLUCOMTR-MCNC: 270 MG/DL (ref 70–99)
GLUCOSE BLDC GLUCOMTR-MCNC: 300 MG/DL (ref 70–99)
GLUCOSE BLDC GLUCOMTR-MCNC: 319 MG/DL (ref 70–99)
GLUCOSE BLDC GLUCOMTR-MCNC: 91 MG/DL (ref 70–99)
GLUCOSE BLDC GLUCOMTR-MCNC: 95 MG/DL (ref 70–99)
GLUCOSE SERPL-MCNC: 106 MG/DL (ref 65–99)
GLUCOSE SERPL-MCNC: 109 MG/DL (ref 65–99)
GLUCOSE SERPL-MCNC: 123 MG/DL (ref 65–99)
GLUCOSE SERPL-MCNC: 126 MG/DL (ref 65–99)
GLUCOSE SERPL-MCNC: 133 MG/DL (ref 65–99)
GLUCOSE SERPL-MCNC: 135 MG/DL (ref 65–99)
GLUCOSE SERPL-MCNC: 147 MG/DL (ref 65–99)
GLUCOSE SERPL-MCNC: 154 MG/DL (ref 65–99)
GLUCOSE SERPL-MCNC: 454 MG/DL (ref 65–99)
GLUCOSE SERPL-MCNC: 92 MG/DL (ref 65–99)
GLUCOSE UR STRIP-MCNC: NEGATIVE MG/DL
HBA1C MFR BLD: 4.7 % (ref 4.8–5.6)
HCO3 BLDA-SCNC: 20.7 MMOL/L (ref 22–26)
HCO3 BLDA-SCNC: 21.6 MMOL/L (ref 22–26)
HCT VFR BLD AUTO: 27.2 % (ref 34–46.6)
HCT VFR BLD AUTO: 27.3 % (ref 34–46.6)
HCT VFR BLD AUTO: 30 % (ref 34–46.6)
HCT VFR BLD AUTO: 30.4 % (ref 34–46.6)
HCT VFR BLD AUTO: 32.6 % (ref 34–46.6)
HCT VFR BLD AUTO: 33.2 % (ref 34–46.6)
HCT VFR BLD AUTO: 36 % (ref 34–46.6)
HCT VFR BLD AUTO: 37.8 % (ref 34–46.6)
HCT VFR BLD AUTO: 38.9 % (ref 34–46.6)
HCT VFR BLD AUTO: 40.4 % (ref 34–46.6)
HGB BLD-MCNC: 10.3 G/DL (ref 12–15.9)
HGB BLD-MCNC: 10.4 G/DL (ref 12–15.9)
HGB BLD-MCNC: 10.9 G/DL (ref 12–15.9)
HGB BLD-MCNC: 11 G/DL (ref 12–15.9)
HGB BLD-MCNC: 12.2 G/DL (ref 12–15.9)
HGB BLD-MCNC: 12.3 G/DL (ref 12–15.9)
HGB BLD-MCNC: 13 G/DL (ref 12–15.9)
HGB BLD-MCNC: 13.4 G/DL (ref 12–15.9)
HGB BLD-MCNC: 9.1 G/DL (ref 12–15.9)
HGB BLD-MCNC: 9.2 G/DL (ref 12–15.9)
HGB BLDA-MCNC: 13.4 G/DL (ref 11.7–14.6)
HGB BLDA-MCNC: 13.6 G/DL (ref 11.7–14.6)
HGB UR QL STRIP.AUTO: ABNORMAL
HGB UR QL STRIP.AUTO: NEGATIVE
HGB UR QL STRIP.AUTO: NEGATIVE
HOLD SPECIMEN: NORMAL
HOLD SPECIMEN: NORMAL
HYALINE CASTS UR QL AUTO: ABNORMAL /LPF
HYALINE CASTS UR QL AUTO: ABNORMAL /LPF
HYPOCHROMIA BLD QL: NORMAL
IMM GRANULOCYTES # BLD AUTO: 0.02 10*3/MM3 (ref 0–0.05)
IMM GRANULOCYTES # BLD AUTO: 0.02 10*3/MM3 (ref 0–0.05)
IMM GRANULOCYTES # BLD AUTO: 0.04 10*3/MM3 (ref 0–0.05)
IMM GRANULOCYTES # BLD AUTO: 0.04 10*3/MM3 (ref 0–0.05)
IMM GRANULOCYTES # BLD AUTO: 0.05 10*3/MM3 (ref 0–0.05)
IMM GRANULOCYTES # BLD AUTO: 0.06 10*3/MM3 (ref 0–0.05)
IMM GRANULOCYTES # BLD AUTO: 0.06 10*3/MM3 (ref 0–0.05)
IMM GRANULOCYTES # BLD AUTO: 0.07 10*3/MM3 (ref 0–0.05)
IMM GRANULOCYTES # BLD AUTO: 0.14 10*3/MM3 (ref 0–0.05)
IMM GRANULOCYTES NFR BLD AUTO: 0.3 % (ref 0–0.5)
IMM GRANULOCYTES NFR BLD AUTO: 0.4 % (ref 0–0.5)
IMM GRANULOCYTES NFR BLD AUTO: 0.5 % (ref 0–0.5)
IMM GRANULOCYTES NFR BLD AUTO: 0.7 % (ref 0–0.5)
IMM GRANULOCYTES NFR BLD AUTO: 0.8 % (ref 0–0.5)
IMM GRANULOCYTES NFR BLD AUTO: 0.9 % (ref 0–0.5)
IMM GRANULOCYTES NFR BLD AUTO: 1.3 % (ref 0–0.5)
IMM GRANULOCYTES NFR BLD AUTO: 1.4 % (ref 0–0.5)
IMM GRANULOCYTES NFR BLD AUTO: 2 % (ref 0–0.5)
INHALED O2 CONCENTRATION: 100 %
INHALED O2 CONCENTRATION: 21 %
INR PPP: 1.13 (ref 0.86–1.15)
INR PPP: 1.15 (ref 2–3)
INR PPP: 1.17 (ref 0.86–1.15)
INR PPP: 1.24 (ref 0.86–1.15)
INR PPP: 1.24 (ref 2–3)
INR PPP: 1.41 (ref 0.86–1.15)
IRON 24H UR-MRATE: 48 MCG/DL (ref 37–145)
IRON SATN MFR SERPL: 46 % (ref 20–50)
IVRT: 98 MSEC
KETONES UR QL STRIP: NEGATIVE
L PNEUMO1 AG UR QL IA: NEGATIVE
LACTATE BLDA-SCNC: 2.82 MMOL/L (ref 0.5–2)
LACTATE BLDA-SCNC: 4.44 MMOL/L (ref 0.5–2)
LARGE PLATELETS: NORMAL
LEFT ATRIUM VOLUME INDEX: 29 ML/M2
LEUKOCYTE ESTERASE UR QL STRIP.AUTO: ABNORMAL
LEUKOCYTE ESTERASE UR QL STRIP.AUTO: NEGATIVE
LEUKOCYTE ESTERASE UR QL STRIP.AUTO: NEGATIVE
LYMPHOCYTES # BLD AUTO: 0.67 10*3/MM3 (ref 0.7–3.1)
LYMPHOCYTES # BLD AUTO: 0.78 10*3/MM3 (ref 0.7–3.1)
LYMPHOCYTES # BLD AUTO: 0.9 10*3/MM3 (ref 0.7–3.1)
LYMPHOCYTES # BLD AUTO: 1.07 10*3/MM3 (ref 0.7–3.1)
LYMPHOCYTES # BLD AUTO: 1.18 10*3/MM3 (ref 0.7–3.1)
LYMPHOCYTES # BLD AUTO: 1.26 10*3/MM3 (ref 0.7–3.1)
LYMPHOCYTES # BLD AUTO: 1.51 10*3/MM3 (ref 0.7–3.1)
LYMPHOCYTES # BLD AUTO: 1.7 10*3/MM3 (ref 0.7–3.1)
LYMPHOCYTES # BLD AUTO: 1.77 10*3/MM3 (ref 0.7–3.1)
LYMPHOCYTES NFR BLD AUTO: 11 % (ref 19.6–45.3)
LYMPHOCYTES NFR BLD AUTO: 12.6 % (ref 19.6–45.3)
LYMPHOCYTES NFR BLD AUTO: 12.6 % (ref 19.6–45.3)
LYMPHOCYTES NFR BLD AUTO: 17.4 % (ref 19.6–45.3)
LYMPHOCYTES NFR BLD AUTO: 18 % (ref 19.6–45.3)
LYMPHOCYTES NFR BLD AUTO: 20.7 % (ref 19.6–45.3)
LYMPHOCYTES NFR BLD AUTO: 26.4 % (ref 19.6–45.3)
LYMPHOCYTES NFR BLD AUTO: 27 % (ref 19.6–45.3)
LYMPHOCYTES NFR BLD AUTO: 30.5 % (ref 19.6–45.3)
MACROCYTES BLD QL SMEAR: NORMAL
MAGNESIUM SERPL-MCNC: 1.6 MG/DL (ref 1.6–2.6)
MAGNESIUM SERPL-MCNC: 1.8 MG/DL (ref 1.6–2.6)
MAGNESIUM SERPL-MCNC: 2 MG/DL (ref 1.6–2.6)
MAGNESIUM SERPL-MCNC: 2.1 MG/DL (ref 1.6–2.6)
MAGNESIUM SERPL-MCNC: 2.3 MG/DL (ref 1.6–2.6)
MAXIMAL PREDICTED HEART RATE: 164 BPM
MCH RBC QN AUTO: 31.8 PG (ref 26.6–33)
MCH RBC QN AUTO: 32.6 PG (ref 26.6–33)
MCH RBC QN AUTO: 32.6 PG (ref 26.6–33)
MCH RBC QN AUTO: 32.9 PG (ref 26.6–33)
MCH RBC QN AUTO: 33.9 PG (ref 26.6–33)
MCH RBC QN AUTO: 33.9 PG (ref 26.6–33)
MCH RBC QN AUTO: 34 PG (ref 26.6–33)
MCH RBC QN AUTO: 36.3 PG (ref 26.6–33)
MCH RBC QN AUTO: 36.3 PG (ref 26.6–33)
MCH RBC QN AUTO: 36.5 PG (ref 26.6–33)
MCHC RBC AUTO-ENTMCNC: 32.2 G/DL (ref 31.5–35.7)
MCHC RBC AUTO-ENTMCNC: 32.5 G/DL (ref 31.5–35.7)
MCHC RBC AUTO-ENTMCNC: 32.8 G/DL (ref 31.5–35.7)
MCHC RBC AUTO-ENTMCNC: 33.5 G/DL (ref 31.5–35.7)
MCHC RBC AUTO-ENTMCNC: 33.7 G/DL (ref 31.5–35.7)
MCHC RBC AUTO-ENTMCNC: 33.7 G/DL (ref 31.5–35.7)
MCHC RBC AUTO-ENTMCNC: 33.9 G/DL (ref 31.5–35.7)
MCHC RBC AUTO-ENTMCNC: 34.2 G/DL (ref 31.5–35.7)
MCHC RBC AUTO-ENTMCNC: 34.3 G/DL (ref 31.5–35.7)
MCHC RBC AUTO-ENTMCNC: 34.4 G/DL (ref 31.5–35.7)
MCV RBC AUTO: 100.3 FL (ref 79–97)
MCV RBC AUTO: 104.1 FL (ref 79–97)
MCV RBC AUTO: 105.4 FL (ref 79–97)
MCV RBC AUTO: 105.5 FL (ref 79–97)
MCV RBC AUTO: 108.3 FL (ref 79–97)
MCV RBC AUTO: 110.7 FL (ref 79–97)
MCV RBC AUTO: 93 FL (ref 79–97)
MCV RBC AUTO: 94.9 FL (ref 79–97)
MCV RBC AUTO: 97.5 FL (ref 79–97)
MCV RBC AUTO: 97.5 FL (ref 79–97)
METHADONE UR QL SCN: NEGATIVE
METHGB BLD QL: 0.1 % (ref 0–1.5)
METHGB BLD QL: 0.5 % (ref 0–1.5)
MODALITY: ABNORMAL
MODALITY: ABNORMAL
MONOCYTES # BLD AUTO: 0.28 10*3/MM3 (ref 0.1–0.9)
MONOCYTES # BLD AUTO: 0.43 10*3/MM3 (ref 0.1–0.9)
MONOCYTES # BLD AUTO: 0.59 10*3/MM3 (ref 0.1–0.9)
MONOCYTES # BLD AUTO: 0.65 10*3/MM3 (ref 0.1–0.9)
MONOCYTES # BLD AUTO: 0.7 10*3/MM3 (ref 0.1–0.9)
MONOCYTES # BLD AUTO: 0.75 10*3/MM3 (ref 0.1–0.9)
MONOCYTES # BLD AUTO: 0.89 10*3/MM3 (ref 0.1–0.9)
MONOCYTES # BLD AUTO: 1.1 10*3/MM3 (ref 0.1–0.9)
MONOCYTES # BLD AUTO: 1.34 10*3/MM3 (ref 0.1–0.9)
MONOCYTES NFR BLD AUTO: 13.3 % (ref 5–12)
MONOCYTES NFR BLD AUTO: 13.5 % (ref 5–12)
MONOCYTES NFR BLD AUTO: 14.2 % (ref 5–12)
MONOCYTES NFR BLD AUTO: 19.1 % (ref 5–12)
MONOCYTES NFR BLD AUTO: 19.7 % (ref 5–12)
MONOCYTES NFR BLD AUTO: 5.4 % (ref 5–12)
MONOCYTES NFR BLD AUTO: 5.9 % (ref 5–12)
MONOCYTES NFR BLD AUTO: 8.2 % (ref 5–12)
MONOCYTES NFR BLD AUTO: 9.2 % (ref 5–12)
MRSA DNA SPEC QL NAA+PROBE: NORMAL
NEUTROPHILS NFR BLD AUTO: 2.5 10*3/MM3 (ref 1.7–7)
NEUTROPHILS NFR BLD AUTO: 2.63 10*3/MM3 (ref 1.7–7)
NEUTROPHILS NFR BLD AUTO: 3.79 10*3/MM3 (ref 1.7–7)
NEUTROPHILS NFR BLD AUTO: 3.83 10*3/MM3 (ref 1.7–7)
NEUTROPHILS NFR BLD AUTO: 3.95 10*3/MM3 (ref 1.7–7)
NEUTROPHILS NFR BLD AUTO: 4.2 10*3/MM3 (ref 1.7–7)
NEUTROPHILS NFR BLD AUTO: 4.86 10*3/MM3 (ref 1.7–7)
NEUTROPHILS NFR BLD AUTO: 47 % (ref 42.7–76)
NEUTROPHILS NFR BLD AUTO: 5.58 10*3/MM3 (ref 1.7–7)
NEUTROPHILS NFR BLD AUTO: 57.2 % (ref 42.7–76)
NEUTROPHILS NFR BLD AUTO: 58.9 % (ref 42.7–76)
NEUTROPHILS NFR BLD AUTO: 59.9 % (ref 42.7–76)
NEUTROPHILS NFR BLD AUTO: 6.7 10*3/MM3 (ref 1.7–7)
NEUTROPHILS NFR BLD AUTO: 66.7 % (ref 42.7–76)
NEUTROPHILS NFR BLD AUTO: 72.2 % (ref 42.7–76)
NEUTROPHILS NFR BLD AUTO: 73.3 % (ref 42.7–76)
NEUTROPHILS NFR BLD AUTO: 78.6 % (ref 42.7–76)
NEUTROPHILS NFR BLD AUTO: 79 % (ref 42.7–76)
NITRITE UR QL STRIP: NEGATIVE
NOTE: ABNORMAL
NOTE: ABNORMAL
NRBC BLD AUTO-RTO: 0 /100 WBC (ref 0–0.2)
NT-PROBNP SERPL-MCNC: 1029 PG/ML (ref 0–900)
NT-PROBNP SERPL-MCNC: 109.9 PG/ML (ref 0–900)
OPIATES UR QL: POSITIVE
OVALOCYTES BLD QL SMEAR: NORMAL
OXYCODONE UR QL SCN: NEGATIVE
OXYHGB MFR BLDV: 91.2 % (ref 94–99)
OXYHGB MFR BLDV: 96.7 % (ref 94–99)
PCO2 BLDA: 21.3 MM HG (ref 35–45)
PCO2 BLDA: 32.3 MM HG (ref 35–45)
PH BLDA: 7.44 PH UNITS (ref 7.35–7.45)
PH BLDA: 7.61 PH UNITS (ref 7.35–7.45)
PH UR STRIP.AUTO: 6 [PH] (ref 5–8)
PH UR STRIP.AUTO: 6.5 [PH] (ref 5–8)
PH UR STRIP.AUTO: 6.5 [PH] (ref 5–8)
PHOSPHATE SERPL-MCNC: 2.6 MG/DL (ref 2.5–4.5)
PHOSPHATE SERPL-MCNC: 2.8 MG/DL (ref 2.5–4.5)
PHOSPHATE SERPL-MCNC: 3.1 MG/DL (ref 2.5–4.5)
PHOSPHATE SERPL-MCNC: 3.4 MG/DL (ref 2.5–4.5)
PHOSPHATE SERPL-MCNC: 3.7 MG/DL (ref 2.5–4.5)
PLATELET # BLD AUTO: 149 10*3/MM3 (ref 140–450)
PLATELET # BLD AUTO: 160 10*3/MM3 (ref 140–450)
PLATELET # BLD AUTO: 169 10*3/MM3 (ref 140–450)
PLATELET # BLD AUTO: 175 10*3/MM3 (ref 140–450)
PLATELET # BLD AUTO: 183 10*3/MM3 (ref 140–450)
PLATELET # BLD AUTO: 188 10*3/MM3 (ref 140–450)
PLATELET # BLD AUTO: 198 10*3/MM3 (ref 140–450)
PLATELET # BLD AUTO: 200 10*3/MM3 (ref 140–450)
PLATELET # BLD AUTO: 216 10*3/MM3 (ref 140–450)
PLATELET # BLD AUTO: 221 10*3/MM3 (ref 140–450)
PMV BLD AUTO: 10.3 FL (ref 6–12)
PMV BLD AUTO: 10.4 FL (ref 6–12)
PMV BLD AUTO: 10.5 FL (ref 6–12)
PMV BLD AUTO: 10.7 FL (ref 6–12)
PMV BLD AUTO: 10.8 FL (ref 6–12)
PMV BLD AUTO: 10.9 FL (ref 6–12)
PMV BLD AUTO: 11 FL (ref 6–12)
PMV BLD AUTO: 11.1 FL (ref 6–12)
PMV BLD AUTO: 11.1 FL (ref 6–12)
PMV BLD AUTO: 11.5 FL (ref 6–12)
PO2 BLD: 436 MM[HG] (ref 0–500)
PO2 BLD: 66 MM[HG] (ref 0–500)
PO2 BLDA: 65.5 MM HG (ref 80–100)
PO2 BLDA: 91.5 MM HG (ref 80–100)
POIKILOCYTOSIS BLD QL SMEAR: NORMAL
POLYCHROMASIA BLD QL SMEAR: NORMAL
POTASSIUM BLDA-SCNC: 3.89 MMOL/L (ref 3.5–5)
POTASSIUM BLDA-SCNC: 4.37 MMOL/L (ref 3.5–5)
POTASSIUM SERPL-SCNC: 3.3 MMOL/L (ref 3.5–5.2)
POTASSIUM SERPL-SCNC: 3.4 MMOL/L (ref 3.5–5.2)
POTASSIUM SERPL-SCNC: 3.7 MMOL/L (ref 3.5–5.2)
POTASSIUM SERPL-SCNC: 3.7 MMOL/L (ref 3.5–5.2)
POTASSIUM SERPL-SCNC: 3.9 MMOL/L (ref 3.5–5.2)
POTASSIUM SERPL-SCNC: 4.1 MMOL/L (ref 3.5–5.2)
POTASSIUM SERPL-SCNC: 4.2 MMOL/L (ref 3.5–5.2)
POTASSIUM SERPL-SCNC: 4.2 MMOL/L (ref 3.5–5.2)
POTASSIUM SERPL-SCNC: 4.4 MMOL/L (ref 3.5–5.2)
POTASSIUM SERPL-SCNC: 4.6 MMOL/L (ref 3.5–5.2)
PROCALCITONIN SERPL-MCNC: 1.32 NG/ML (ref 0–0.25)
PROCALCITONIN SERPL-MCNC: 2.77 NG/ML (ref 0–0.25)
PROT SERPL-MCNC: 6.1 G/DL (ref 6–8.5)
PROT SERPL-MCNC: 6.2 G/DL (ref 6–8.5)
PROT SERPL-MCNC: 6.3 G/DL (ref 6–8.5)
PROT SERPL-MCNC: 6.9 G/DL (ref 6–8.5)
PROT SERPL-MCNC: 7.1 G/DL (ref 6–8.5)
PROT SERPL-MCNC: 7.4 G/DL (ref 6–8.5)
PROT UR QL STRIP: ABNORMAL
PROT UR QL STRIP: NEGATIVE
PROT UR QL STRIP: NEGATIVE
PROTHROMBIN TIME: 11.9 SECONDS (ref 9.4–12)
PROTHROMBIN TIME: 12.6 SECONDS (ref 9.4–12)
PROTHROMBIN TIME: 14.6 SECONDS (ref 11.8–14.9)
PROTHROMBIN TIME: 15.1 SECONDS (ref 11.8–14.9)
PROTHROMBIN TIME: 15.7 SECONDS (ref 11.8–14.9)
PROTHROMBIN TIME: 17.5 SECONDS (ref 11.8–14.9)
QT INTERVAL: 343 MS
QT INTERVAL: 384 MS
QT INTERVAL: 385 MS
RBC # BLD AUTO: 2.79 10*6/MM3 (ref 3.77–5.28)
RBC # BLD AUTO: 2.8 10*6/MM3 (ref 3.77–5.28)
RBC # BLD AUTO: 3 10*6/MM3 (ref 3.77–5.28)
RBC # BLD AUTO: 3.01 10*6/MM3 (ref 3.77–5.28)
RBC # BLD AUTO: 3.16 10*6/MM3 (ref 3.77–5.28)
RBC # BLD AUTO: 3.27 10*6/MM3 (ref 3.77–5.28)
RBC # BLD AUTO: 3.59 10*6/MM3 (ref 3.77–5.28)
RBC # BLD AUTO: 3.63 10*6/MM3 (ref 3.77–5.28)
RBC # BLD AUTO: 3.69 10*6/MM3 (ref 3.77–5.28)
RBC # BLD AUTO: 3.83 10*6/MM3 (ref 3.77–5.28)
RBC # UR STRIP: ABNORMAL /HPF
RBC # UR STRIP: ABNORMAL /HPF
REF LAB TEST METHOD: ABNORMAL
REF LAB TEST METHOD: ABNORMAL
S PNEUM AG SPEC QL LA: NEGATIVE
SAO2 % BLDCOA: 92.9 % (ref 95–99)
SAO2 % BLDCOA: 97.5 % (ref 95–99)
SARS-COV-2 RNA PNL SPEC NAA+PROBE: NOT DETECTED
SMALL PLATELETS BLD QL SMEAR: ADEQUATE
SODIUM BLDA-SCNC: 134.3 MMOL/L (ref 136–146)
SODIUM BLDA-SCNC: 136.1 MMOL/L (ref 136–146)
SODIUM SERPL-SCNC: 131 MMOL/L (ref 136–145)
SODIUM SERPL-SCNC: 133 MMOL/L (ref 136–145)
SODIUM SERPL-SCNC: 136 MMOL/L (ref 136–145)
SODIUM SERPL-SCNC: 137 MMOL/L (ref 136–145)
SODIUM SERPL-SCNC: 138 MMOL/L (ref 136–145)
SODIUM SERPL-SCNC: 139 MMOL/L (ref 136–145)
SODIUM SERPL-SCNC: 139 MMOL/L (ref 136–145)
SODIUM SERPL-SCNC: 141 MMOL/L (ref 136–145)
SODIUM SERPL-SCNC: 143 MMOL/L (ref 136–145)
SODIUM SERPL-SCNC: 144 MMOL/L (ref 136–145)
SP GR UR STRIP: 1.01 (ref 1–1.03)
SP GR UR STRIP: 1.01 (ref 1–1.03)
SP GR UR STRIP: 1.02 (ref 1–1.03)
SQUAMOUS #/AREA URNS HPF: ABNORMAL /HPF
SQUAMOUS #/AREA URNS HPF: ABNORMAL /HPF
STRESS TARGET HR: 139 BPM
TIBC SERPL-MCNC: 104 MCG/DL (ref 298–536)
TRANSFERRIN SERPL-MCNC: 70 MG/DL (ref 200–360)
TROPONIN T SERPL-MCNC: <0.01 NG/ML (ref 0–0.03)
TSH SERPL DL<=0.05 MIU/L-ACNC: 1.13 UIU/ML (ref 0.27–4.2)
UROBILINOGEN UR QL STRIP: ABNORMAL
UROBILINOGEN UR QL STRIP: ABNORMAL
UROBILINOGEN UR QL STRIP: NORMAL
VANCOMYCIN SERPL-MCNC: 23.9 MCG/ML (ref 5–40)
WBC # UR STRIP: ABNORMAL /HPF
WBC # UR STRIP: ABNORMAL /HPF
WBC MORPH BLD: NORMAL
WBC NRBC COR # BLD: 11.54 10*3/MM3 (ref 3.4–10.8)
WBC NRBC COR # BLD: 4.37 10*3/MM3 (ref 3.4–10.8)
WBC NRBC COR # BLD: 5.17 10*3/MM3 (ref 3.4–10.8)
WBC NRBC COR # BLD: 5.3 10*3/MM3 (ref 3.4–10.8)
WBC NRBC COR # BLD: 5.58 10*3/MM3 (ref 3.4–10.8)
WBC NRBC COR # BLD: 6.71 10*3/MM3 (ref 3.4–10.8)
WBC NRBC COR # BLD: 7.01 10*3/MM3 (ref 3.4–10.8)
WBC NRBC COR # BLD: 7.07 10*3/MM3 (ref 3.4–10.8)
WBC NRBC COR # BLD: 7.29 10*3/MM3 (ref 3.4–10.8)
WBC NRBC COR # BLD: 8.52 10*3/MM3 (ref 3.4–10.8)
WHOLE BLOOD HOLD COAG: NORMAL
WHOLE BLOOD HOLD SPECIMEN: NORMAL
YEAST URNS QL MICRO: ABNORMAL /HPF

## 2022-01-01 PROCEDURE — 25010000002 MAGNESIUM SULFATE 2 GM/50ML SOLUTION: Performed by: INTERNAL MEDICINE

## 2022-01-01 PROCEDURE — G0439 PPPS, SUBSEQ VISIT: HCPCS | Performed by: NURSE PRACTITIONER

## 2022-01-01 PROCEDURE — 25010000002 LORAZEPAM PER 2 MG: Performed by: PHYSICIAN ASSISTANT

## 2022-01-01 PROCEDURE — 84145 PROCALCITONIN (PCT): CPT | Performed by: INTERNAL MEDICINE

## 2022-01-01 PROCEDURE — 83050 HGB METHEMOGLOBIN QUAN: CPT | Performed by: EMERGENCY MEDICINE

## 2022-01-01 PROCEDURE — 25010000002 METHYLPREDNISOLONE PER 125 MG: Performed by: INTERNAL MEDICINE

## 2022-01-01 PROCEDURE — P9612 CATHETERIZE FOR URINE SPEC: HCPCS

## 2022-01-01 PROCEDURE — 36415 COLL VENOUS BLD VENIPUNCTURE: CPT | Performed by: HOSPITALIST

## 2022-01-01 PROCEDURE — 99285 EMERGENCY DEPT VISIT HI MDM: CPT

## 2022-01-01 PROCEDURE — 84484 ASSAY OF TROPONIN QUANT: CPT | Performed by: EMERGENCY MEDICINE

## 2022-01-01 PROCEDURE — 85610 PROTHROMBIN TIME: CPT | Performed by: EMERGENCY MEDICINE

## 2022-01-01 PROCEDURE — 85025 COMPLETE CBC W/AUTO DIFF WBC: CPT | Performed by: HOSPITALIST

## 2022-01-01 PROCEDURE — 25010000002 FUROSEMIDE PER 20 MG: Performed by: FAMILY MEDICINE

## 2022-01-01 PROCEDURE — 93306 TTE W/DOPPLER COMPLETE: CPT

## 2022-01-01 PROCEDURE — 84100 ASSAY OF PHOSPHORUS: CPT | Performed by: HOSPITALIST

## 2022-01-01 PROCEDURE — 85610 PROTHROMBIN TIME: CPT | Performed by: HOSPITALIST

## 2022-01-01 PROCEDURE — 99233 SBSQ HOSP IP/OBS HIGH 50: CPT | Performed by: INTERNAL MEDICINE

## 2022-01-01 PROCEDURE — 25010000002 KETOROLAC TROMETHAMINE PER 15 MG: Performed by: INTERNAL MEDICINE

## 2022-01-01 PROCEDURE — 72131 CT LUMBAR SPINE W/O DYE: CPT

## 2022-01-01 PROCEDURE — 73502 X-RAY EXAM HIP UNI 2-3 VIEWS: CPT

## 2022-01-01 PROCEDURE — 83036 HEMOGLOBIN GLYCOSYLATED A1C: CPT | Performed by: INTERNAL MEDICINE

## 2022-01-01 PROCEDURE — 92610 EVALUATE SWALLOWING FUNCTION: CPT

## 2022-01-01 PROCEDURE — 93005 ELECTROCARDIOGRAM TRACING: CPT | Performed by: EMERGENCY MEDICINE

## 2022-01-01 PROCEDURE — 99239 HOSP IP/OBS DSCHRG MGMT >30: CPT | Performed by: INTERNAL MEDICINE

## 2022-01-01 PROCEDURE — 87641 MR-STAPH DNA AMP PROBE: CPT | Performed by: EMERGENCY MEDICINE

## 2022-01-01 PROCEDURE — 25010000002 MEROPENEM PER 100 MG: Performed by: HOSPITALIST

## 2022-01-01 PROCEDURE — 97116 GAIT TRAINING THERAPY: CPT

## 2022-01-01 PROCEDURE — 99291 CRITICAL CARE FIRST HOUR: CPT | Performed by: INTERNAL MEDICINE

## 2022-01-01 PROCEDURE — 85007 BL SMEAR W/DIFF WBC COUNT: CPT | Performed by: EMERGENCY MEDICINE

## 2022-01-01 PROCEDURE — 25010000002 VANCOMYCIN 5 G RECONSTITUTED SOLUTION

## 2022-01-01 PROCEDURE — 25010000002 ONDANSETRON PER 1 MG: Performed by: NURSE ANESTHETIST, CERTIFIED REGISTERED

## 2022-01-01 PROCEDURE — 0SRB0JA REPLACEMENT OF LEFT HIP JOINT WITH SYNTHETIC SUBSTITUTE, UNCEMENTED, OPEN APPROACH: ICD-10-PCS | Performed by: ORTHOPAEDIC SURGERY

## 2022-01-01 PROCEDURE — 25010000002 HYDROMORPHONE 1 MG/ML SOLUTION: Performed by: HOSPITALIST

## 2022-01-01 PROCEDURE — 93306 TTE W/DOPPLER COMPLETE: CPT | Performed by: INTERNAL MEDICINE

## 2022-01-01 PROCEDURE — 25010000002 DEXAMETHASONE PER 1 MG: Performed by: NURSE ANESTHETIST, CERTIFIED REGISTERED

## 2022-01-01 PROCEDURE — 87899 AGENT NOS ASSAY W/OPTIC: CPT | Performed by: PHYSICIAN ASSISTANT

## 2022-01-01 PROCEDURE — 82962 GLUCOSE BLOOD TEST: CPT

## 2022-01-01 PROCEDURE — 83880 ASSAY OF NATRIURETIC PEPTIDE: CPT | Performed by: EMERGENCY MEDICINE

## 2022-01-01 PROCEDURE — 83540 ASSAY OF IRON: CPT | Performed by: HOSPITALIST

## 2022-01-01 PROCEDURE — 1160F RVW MEDS BY RX/DR IN RCRD: CPT | Performed by: NURSE PRACTITIONER

## 2022-01-01 PROCEDURE — 83735 ASSAY OF MAGNESIUM: CPT | Performed by: HOSPITALIST

## 2022-01-01 PROCEDURE — 99284 EMERGENCY DEPT VISIT MOD MDM: CPT

## 2022-01-01 PROCEDURE — 80048 BASIC METABOLIC PNL TOTAL CA: CPT | Performed by: HOSPITALIST

## 2022-01-01 PROCEDURE — 82805 BLOOD GASES W/O2 SATURATION: CPT | Performed by: EMERGENCY MEDICINE

## 2022-01-01 PROCEDURE — 25010000002 KETOROLAC TROMETHAMINE PER 15 MG: Performed by: ORTHOPAEDIC SURGERY

## 2022-01-01 PROCEDURE — 73501 X-RAY EXAM HIP UNI 1 VIEW: CPT

## 2022-01-01 PROCEDURE — 85025 COMPLETE CBC W/AUTO DIFF WBC: CPT | Performed by: EMERGENCY MEDICINE

## 2022-01-01 PROCEDURE — 85027 COMPLETE CBC AUTOMATED: CPT | Performed by: INTERNAL MEDICINE

## 2022-01-01 PROCEDURE — 80307 DRUG TEST PRSMV CHEM ANLYZR: CPT | Performed by: EMERGENCY MEDICINE

## 2022-01-01 PROCEDURE — 80048 BASIC METABOLIC PNL TOTAL CA: CPT | Performed by: ORTHOPAEDIC SURGERY

## 2022-01-01 PROCEDURE — 85025 COMPLETE CBC W/AUTO DIFF WBC: CPT | Performed by: INTERNAL MEDICINE

## 2022-01-01 PROCEDURE — 94799 UNLISTED PULMONARY SVC/PX: CPT

## 2022-01-01 PROCEDURE — 63710000001 DIPHENHYDRAMINE PER 50 MG: Performed by: INTERNAL MEDICINE

## 2022-01-01 PROCEDURE — 25010000002 HYDROMORPHONE 1 MG/ML SOLUTION: Performed by: EMERGENCY MEDICINE

## 2022-01-01 PROCEDURE — 25010000002 ENOXAPARIN PER 10 MG: Performed by: ORTHOPAEDIC SURGERY

## 2022-01-01 PROCEDURE — 82077 ASSAY SPEC XCP UR&BREATH IA: CPT | Performed by: EMERGENCY MEDICINE

## 2022-01-01 PROCEDURE — 84466 ASSAY OF TRANSFERRIN: CPT | Performed by: HOSPITALIST

## 2022-01-01 PROCEDURE — 84443 ASSAY THYROID STIM HORMONE: CPT | Performed by: EMERGENCY MEDICINE

## 2022-01-01 PROCEDURE — 87040 BLOOD CULTURE FOR BACTERIA: CPT | Performed by: EMERGENCY MEDICINE

## 2022-01-01 PROCEDURE — 74018 RADEX ABDOMEN 1 VIEW: CPT

## 2022-01-01 PROCEDURE — 81001 URINALYSIS AUTO W/SCOPE: CPT | Performed by: EMERGENCY MEDICINE

## 2022-01-01 PROCEDURE — 63710000001 INSULIN LISPRO (HUMAN) PER 5 UNITS: Performed by: ORTHOPAEDIC SURGERY

## 2022-01-01 PROCEDURE — 25010000002 MEROPENEM PER 100 MG: Performed by: EMERGENCY MEDICINE

## 2022-01-01 PROCEDURE — 83735 ASSAY OF MAGNESIUM: CPT | Performed by: NURSE PRACTITIONER

## 2022-01-01 PROCEDURE — 25010000002 VANCOMYCIN 5 G RECONSTITUTED SOLUTION: Performed by: ORTHOPAEDIC SURGERY

## 2022-01-01 PROCEDURE — 82140 ASSAY OF AMMONIA: CPT | Performed by: HOSPITALIST

## 2022-01-01 PROCEDURE — 25010000002 KETOROLAC TROMETHAMINE PER 15 MG: Performed by: PHYSICIAN ASSISTANT

## 2022-01-01 PROCEDURE — 83735 ASSAY OF MAGNESIUM: CPT | Performed by: EMERGENCY MEDICINE

## 2022-01-01 PROCEDURE — 80053 COMPREHEN METABOLIC PANEL: CPT | Performed by: INTERNAL MEDICINE

## 2022-01-01 PROCEDURE — 80069 RENAL FUNCTION PANEL: CPT | Performed by: PHYSICIAN ASSISTANT

## 2022-01-01 PROCEDURE — 80048 BASIC METABOLIC PNL TOTAL CA: CPT | Performed by: FAMILY MEDICINE

## 2022-01-01 PROCEDURE — 27130 TOTAL HIP ARTHROPLASTY: CPT | Performed by: ORTHOPAEDIC SURGERY

## 2022-01-01 PROCEDURE — 99024 POSTOP FOLLOW-UP VISIT: CPT | Performed by: PHYSICIAN ASSISTANT

## 2022-01-01 PROCEDURE — 97165 OT EVAL LOW COMPLEX 30 MIN: CPT

## 2022-01-01 PROCEDURE — 87077 CULTURE AEROBIC IDENTIFY: CPT | Performed by: EMERGENCY MEDICINE

## 2022-01-01 PROCEDURE — 25010000002 KETOROLAC TROMETHAMINE PER 15 MG: Performed by: HOSPITALIST

## 2022-01-01 PROCEDURE — 99232 SBSQ HOSP IP/OBS MODERATE 35: CPT | Performed by: ORTHOPAEDIC SURGERY

## 2022-01-01 PROCEDURE — 25010000002 PROPOFOL 10 MG/ML EMULSION: Performed by: NURSE ANESTHETIST, CERTIFIED REGISTERED

## 2022-01-01 PROCEDURE — 25010000002 MEROPENEM PER 100 MG

## 2022-01-01 PROCEDURE — 99223 1ST HOSP IP/OBS HIGH 75: CPT | Performed by: FAMILY MEDICINE

## 2022-01-01 PROCEDURE — 25010000002 ROPIVACAINE PER 1 MG: Performed by: ORTHOPAEDIC SURGERY

## 2022-01-01 PROCEDURE — 71250 CT THORAX DX C-: CPT

## 2022-01-01 PROCEDURE — 87186 SC STD MICRODIL/AGAR DIL: CPT | Performed by: EMERGENCY MEDICINE

## 2022-01-01 PROCEDURE — 85025 COMPLETE CBC W/AUTO DIFF WBC: CPT | Performed by: FAMILY MEDICINE

## 2022-01-01 PROCEDURE — C1776 JOINT DEVICE (IMPLANTABLE): HCPCS | Performed by: ORTHOPAEDIC SURGERY

## 2022-01-01 PROCEDURE — 80076 HEPATIC FUNCTION PANEL: CPT | Performed by: INTERNAL MEDICINE

## 2022-01-01 PROCEDURE — 94760 N-INVAS EAR/PLS OXIMETRY 1: CPT

## 2022-01-01 PROCEDURE — 25010000002 GANCICLOVIR PER 500 MG: Performed by: HOSPITALIST

## 2022-01-01 PROCEDURE — 81003 URINALYSIS AUTO W/O SCOPE: CPT | Performed by: PHYSICIAN ASSISTANT

## 2022-01-01 PROCEDURE — 76000 FLUOROSCOPY <1 HR PHYS/QHP: CPT

## 2022-01-01 PROCEDURE — 25010000002 KETOROLAC TROMETHAMINE PER 15 MG: Performed by: EMERGENCY MEDICINE

## 2022-01-01 PROCEDURE — 70450 CT HEAD/BRAIN W/O DYE: CPT

## 2022-01-01 PROCEDURE — 36600 WITHDRAWAL OF ARTERIAL BLOOD: CPT | Performed by: EMERGENCY MEDICINE

## 2022-01-01 PROCEDURE — 0DH67UZ INSERTION OF FEEDING DEVICE INTO STOMACH, VIA NATURAL OR ARTIFICIAL OPENING: ICD-10-PCS | Performed by: EMERGENCY MEDICINE

## 2022-01-01 PROCEDURE — 84100 ASSAY OF PHOSPHORUS: CPT | Performed by: NURSE PRACTITIONER

## 2022-01-01 PROCEDURE — U0005 INFEC AGEN DETEC AMPLI PROBE: HCPCS | Performed by: INTERNAL MEDICINE

## 2022-01-01 PROCEDURE — 25010000002 VANCOMYCIN 5 G RECONSTITUTED SOLUTION: Performed by: EMERGENCY MEDICINE

## 2022-01-01 PROCEDURE — 25010000002 EPINEPHRINE 1 MG/ML SOLUTION: Performed by: ORTHOPAEDIC SURGERY

## 2022-01-01 PROCEDURE — 25010000002 VANCOMYCIN 5 G RECONSTITUTED SOLUTION: Performed by: HOSPITALIST

## 2022-01-01 PROCEDURE — 93010 ELECTROCARDIOGRAM REPORT: CPT | Performed by: INTERNAL MEDICINE

## 2022-01-01 PROCEDURE — 83735 ASSAY OF MAGNESIUM: CPT | Performed by: INTERNAL MEDICINE

## 2022-01-01 PROCEDURE — 80053 COMPREHEN METABOLIC PANEL: CPT | Performed by: EMERGENCY MEDICINE

## 2022-01-01 PROCEDURE — 85007 BL SMEAR W/DIFF WBC COUNT: CPT | Performed by: HOSPITALIST

## 2022-01-01 PROCEDURE — 99214 OFFICE O/P EST MOD 30 MIN: CPT | Performed by: NURSE PRACTITIONER

## 2022-01-01 PROCEDURE — 99292 CRITICAL CARE ADDL 30 MIN: CPT | Performed by: INTERNAL MEDICINE

## 2022-01-01 PROCEDURE — 71045 X-RAY EXAM CHEST 1 VIEW: CPT

## 2022-01-01 PROCEDURE — 82140 ASSAY OF AMMONIA: CPT | Performed by: EMERGENCY MEDICINE

## 2022-01-01 PROCEDURE — 80202 ASSAY OF VANCOMYCIN: CPT | Performed by: HOSPITALIST

## 2022-01-01 PROCEDURE — 94761 N-INVAS EAR/PLS OXIMETRY MLT: CPT

## 2022-01-01 PROCEDURE — 84145 PROCALCITONIN (PCT): CPT | Performed by: HOSPITALIST

## 2022-01-01 PROCEDURE — 25010000002 MORPHINE PER 10 MG: Performed by: HOSPITALIST

## 2022-01-01 PROCEDURE — 25010000002 MAGNESIUM SULFATE 2 GM/50ML SOLUTION: Performed by: PHYSICIAN ASSISTANT

## 2022-01-01 PROCEDURE — U0004 COV-19 TEST NON-CDC HGH THRU: HCPCS | Performed by: INTERNAL MEDICINE

## 2022-01-01 PROCEDURE — 82375 ASSAY CARBOXYHB QUANT: CPT | Performed by: EMERGENCY MEDICINE

## 2022-01-01 PROCEDURE — 25010000002 METHYLPREDNISOLONE PER 125 MG: Performed by: EMERGENCY MEDICINE

## 2022-01-01 PROCEDURE — 83605 ASSAY OF LACTIC ACID: CPT | Performed by: EMERGENCY MEDICINE

## 2022-01-01 PROCEDURE — 82550 ASSAY OF CK (CPK): CPT | Performed by: EMERGENCY MEDICINE

## 2022-01-01 PROCEDURE — 81001 URINALYSIS AUTO W/SCOPE: CPT | Performed by: NURSE PRACTITIONER

## 2022-01-01 PROCEDURE — 99291 CRITICAL CARE FIRST HOUR: CPT | Performed by: HOSPITALIST

## 2022-01-01 PROCEDURE — 84100 ASSAY OF PHOSPHORUS: CPT | Performed by: INTERNAL MEDICINE

## 2022-01-01 PROCEDURE — 87086 URINE CULTURE/COLONY COUNT: CPT | Performed by: EMERGENCY MEDICINE

## 2022-01-01 PROCEDURE — 25010000002 MORPHINE (PF) 10 MG/ML SOLUTION: Performed by: ORTHOPAEDIC SURGERY

## 2022-01-01 PROCEDURE — 85025 COMPLETE CBC W/AUTO DIFF WBC: CPT | Performed by: ORTHOPAEDIC SURGERY

## 2022-01-01 PROCEDURE — 25010000002 FENTANYL CITRATE (PF) 50 MCG/ML SOLUTION: Performed by: NURSE ANESTHETIST, CERTIFIED REGISTERED

## 2022-01-01 PROCEDURE — 1170F FXNL STATUS ASSESSED: CPT | Performed by: NURSE PRACTITIONER

## 2022-01-01 PROCEDURE — 99223 1ST HOSP IP/OBS HIGH 75: CPT | Performed by: HOSPITALIST

## 2022-01-01 PROCEDURE — 97116 GAIT TRAINING THERAPY: CPT | Performed by: PHYSICAL THERAPIST

## 2022-01-01 PROCEDURE — 87086 URINE CULTURE/COLONY COUNT: CPT | Performed by: PHYSICIAN ASSISTANT

## 2022-01-01 PROCEDURE — 82728 ASSAY OF FERRITIN: CPT | Performed by: HOSPITALIST

## 2022-01-01 PROCEDURE — 72192 CT PELVIS W/O DYE: CPT

## 2022-01-01 PROCEDURE — 80076 HEPATIC FUNCTION PANEL: CPT | Performed by: PHYSICIAN ASSISTANT

## 2022-01-01 DEVICE — ADAPT HIP BIOLOX OPTN TYPE1 TPR MIN 6: Type: IMPLANTABLE DEVICE | Site: HIP | Status: FUNCTIONAL

## 2022-01-01 DEVICE — SHLL ACET G7 PPS LTD/HL TI SZC 48MM: Type: IMPLANTABLE DEVICE | Site: HIP | Status: FUNCTIONAL

## 2022-01-01 DEVICE — HD FEM/HIP G7 BIOLOX/DELTA OPTN 32MM: Type: IMPLANTABLE DEVICE | Site: HIP | Status: FUNCTIONAL

## 2022-01-01 DEVICE — IMPLANTABLE DEVICE: Type: IMPLANTABLE DEVICE | Site: HIP | Status: FUNCTIONAL

## 2022-01-01 DEVICE — SCRW ACET CORT TRILOGY S/TAP 6.5X25: Type: IMPLANTABLE DEVICE | Site: HIP | Status: FUNCTIONAL

## 2022-01-01 DEVICE — LINER ACET G7 NTRL E1 SZC 32MM: Type: IMPLANTABLE DEVICE | Site: HIP | Status: FUNCTIONAL

## 2022-01-01 DEVICE — TOTAL HIP PRIMARY: Type: IMPLANTABLE DEVICE | Site: HIP | Status: FUNCTIONAL

## 2022-01-01 RX ORDER — NALOXONE HCL 0.4 MG/ML
0.4 VIAL (ML) INJECTION
Status: DISCONTINUED | OUTPATIENT
Start: 2022-01-01 | End: 2022-01-01

## 2022-01-01 RX ORDER — EPHEDRINE SULFATE 50 MG/ML
INJECTION, SOLUTION INTRAVENOUS AS NEEDED
Status: DISCONTINUED | OUTPATIENT
Start: 2022-01-01 | End: 2022-01-01 | Stop reason: SURG

## 2022-01-01 RX ORDER — POTASSIUM CHLORIDE 750 MG/1
40 CAPSULE, EXTENDED RELEASE ORAL ONCE
Status: COMPLETED | OUTPATIENT
Start: 2022-01-01 | End: 2022-01-01

## 2022-01-01 RX ORDER — PROPOFOL 10 MG/ML
VIAL (ML) INTRAVENOUS AS NEEDED
Status: DISCONTINUED | OUTPATIENT
Start: 2022-01-01 | End: 2022-01-01 | Stop reason: SURG

## 2022-01-01 RX ORDER — NALOXONE HCL 0.4 MG/ML
0.4 VIAL (ML) INJECTION
Status: DISCONTINUED | OUTPATIENT
Start: 2022-01-01 | End: 2022-01-01 | Stop reason: SDUPTHER

## 2022-01-01 RX ORDER — PROMETHAZINE HYDROCHLORIDE 12.5 MG/1
12.5 TABLET ORAL EVERY 6 HOURS PRN
Status: DISCONTINUED | OUTPATIENT
Start: 2022-01-01 | End: 2022-01-01 | Stop reason: HOSPADM

## 2022-01-01 RX ORDER — PROMETHAZINE HYDROCHLORIDE 25 MG/1
25 SUPPOSITORY RECTAL ONCE AS NEEDED
Status: DISCONTINUED | OUTPATIENT
Start: 2022-01-01 | End: 2022-01-01

## 2022-01-01 RX ORDER — RIZATRIPTAN BENZOATE 10 MG/1
10 TABLET ORAL ONCE AS NEEDED
COMMUNITY
End: 2022-01-01 | Stop reason: SDUPTHER

## 2022-01-01 RX ORDER — VALGANCICLOVIR 450 MG/1
900 TABLET, FILM COATED ORAL DAILY
Status: DISCONTINUED | OUTPATIENT
Start: 2022-01-01 | End: 2022-01-01

## 2022-01-01 RX ORDER — LACTULOSE 10 G/15ML
30 SOLUTION ORAL DAILY
Status: DISCONTINUED | OUTPATIENT
Start: 2022-01-01 | End: 2022-01-01

## 2022-01-01 RX ORDER — METHYLPREDNISOLONE SODIUM SUCCINATE 125 MG/2ML
125 INJECTION, POWDER, LYOPHILIZED, FOR SOLUTION INTRAMUSCULAR; INTRAVENOUS EVERY 12 HOURS
Status: DISCONTINUED | OUTPATIENT
Start: 2022-01-01 | End: 2022-01-01 | Stop reason: HOSPADM

## 2022-01-01 RX ORDER — PRIMAQUINE PHOSPHATE 15 MG/1
30 TABLET, FILM COATED ORAL DAILY
Status: ON HOLD | COMMUNITY
Start: 2022-01-01 | End: 2022-01-01

## 2022-01-01 RX ORDER — SODIUM CHLORIDE 0.9 % (FLUSH) 0.9 %
10 SYRINGE (ML) INJECTION EVERY 12 HOURS SCHEDULED
Status: DISCONTINUED | OUTPATIENT
Start: 2022-01-01 | End: 2022-01-01 | Stop reason: HOSPADM

## 2022-01-01 RX ORDER — DEXTROSE MONOHYDRATE 100 MG/ML
50-250 INJECTION, SOLUTION INTRAVENOUS
Status: DISCONTINUED | OUTPATIENT
Start: 2022-01-01 | End: 2022-01-01 | Stop reason: HOSPADM

## 2022-01-01 RX ORDER — LACTOBACILLUS RHAMNOSUS GG 10B CELL
1 CAPSULE ORAL DAILY
Status: ON HOLD | COMMUNITY
Start: 2022-01-01 | End: 2022-01-01

## 2022-01-01 RX ORDER — ONDANSETRON 4 MG/1
4 TABLET, FILM COATED ORAL EVERY 6 HOURS PRN
Status: DISCONTINUED | OUTPATIENT
Start: 2022-01-01 | End: 2022-01-01 | Stop reason: HOSPADM

## 2022-01-01 RX ORDER — NITROGLYCERIN 0.4 MG/1
0.4 TABLET SUBLINGUAL
Status: DISCONTINUED | OUTPATIENT
Start: 2022-01-01 | End: 2022-01-01

## 2022-01-01 RX ORDER — HYDROCODONE BITARTRATE AND ACETAMINOPHEN 7.5; 325 MG/1; MG/1
1 TABLET ORAL EVERY 4 HOURS PRN
Status: ON HOLD | COMMUNITY
End: 2022-01-01

## 2022-01-01 RX ORDER — METHYLPREDNISOLONE SODIUM SUCCINATE 125 MG/2ML
125 INJECTION, POWDER, LYOPHILIZED, FOR SOLUTION INTRAMUSCULAR; INTRAVENOUS ONCE
Status: COMPLETED | OUTPATIENT
Start: 2022-01-01 | End: 2022-01-01

## 2022-01-01 RX ORDER — NICOTINE POLACRILEX 4 MG
24 LOZENGE BUCCAL
Status: DISCONTINUED | OUTPATIENT
Start: 2022-01-01 | End: 2022-01-01 | Stop reason: HOSPADM

## 2022-01-01 RX ORDER — MAGNESIUM SULFATE HEPTAHYDRATE 40 MG/ML
2 INJECTION, SOLUTION INTRAVENOUS ONCE
Status: COMPLETED | OUTPATIENT
Start: 2022-01-01 | End: 2022-01-01

## 2022-01-01 RX ORDER — SODIUM CHLORIDE 9 MG/ML
INJECTION, SOLUTION INTRAVENOUS CONTINUOUS PRN
Status: DISCONTINUED | OUTPATIENT
Start: 2022-01-01 | End: 2022-01-01 | Stop reason: SURG

## 2022-01-01 RX ORDER — ACETAMINOPHEN 500 MG
1000 TABLET ORAL 3 TIMES DAILY
Status: DISCONTINUED | OUTPATIENT
Start: 2022-01-01 | End: 2022-01-01

## 2022-01-01 RX ORDER — METHYLPREDNISOLONE SODIUM SUCCINATE 125 MG/2ML
125 INJECTION, POWDER, LYOPHILIZED, FOR SOLUTION INTRAMUSCULAR; INTRAVENOUS EVERY 12 HOURS
Start: 2022-01-01 | End: 2022-01-01

## 2022-01-01 RX ORDER — DAPSONE 100 MG/1
100 TABLET ORAL
Status: DISCONTINUED | OUTPATIENT
Start: 2022-01-01 | End: 2022-01-01 | Stop reason: HOSPADM

## 2022-01-01 RX ORDER — EMTRICITABINE AND TENOFOVIR DISOPROXIL FUMARATE 200; 300 MG/1; MG/1
1 TABLET, FILM COATED ORAL
Status: DISCONTINUED | OUTPATIENT
Start: 2022-01-01 | End: 2022-01-01 | Stop reason: HOSPADM

## 2022-01-01 RX ORDER — ACETAMINOPHEN 325 MG/1
650 TABLET ORAL EVERY 8 HOURS PRN
COMMUNITY
Start: 2022-01-01 | End: 2022-01-01

## 2022-01-01 RX ORDER — ACETAMINOPHEN 160 MG/5ML
650 SOLUTION ORAL EVERY 4 HOURS PRN
Status: DISCONTINUED | OUTPATIENT
Start: 2022-01-01 | End: 2022-01-01

## 2022-01-01 RX ORDER — OLANZAPINE 2.5 MG/1
2.5 TABLET ORAL 2 TIMES DAILY
COMMUNITY
Start: 2022-01-01 | End: 2022-01-01 | Stop reason: HOSPADM

## 2022-01-01 RX ORDER — METOPROLOL TARTRATE 50 MG/1
50 TABLET, FILM COATED ORAL EVERY 8 HOURS SCHEDULED
Status: DISCONTINUED | OUTPATIENT
Start: 2022-01-01 | End: 2022-01-01 | Stop reason: HOSPADM

## 2022-01-01 RX ORDER — MIDAZOLAM HYDROCHLORIDE 1 MG/ML
2 INJECTION INTRAMUSCULAR; INTRAVENOUS ONCE
Status: DISCONTINUED | OUTPATIENT
Start: 2022-01-01 | End: 2022-01-01 | Stop reason: HOSPADM

## 2022-01-01 RX ORDER — ONDANSETRON 4 MG/1
4 TABLET, FILM COATED ORAL EVERY 6 HOURS PRN
Status: DISCONTINUED | OUTPATIENT
Start: 2022-01-01 | End: 2022-01-01 | Stop reason: SDUPTHER

## 2022-01-01 RX ORDER — PROMETHAZINE HYDROCHLORIDE 12.5 MG/1
25 TABLET ORAL ONCE AS NEEDED
Status: DISCONTINUED | OUTPATIENT
Start: 2022-01-01 | End: 2022-01-01

## 2022-01-01 RX ORDER — POLYETHYLENE GLYCOL 3350 17 G/17G
17 POWDER, FOR SOLUTION ORAL DAILY PRN
Status: DISCONTINUED | OUTPATIENT
Start: 2022-01-01 | End: 2022-01-01 | Stop reason: HOSPADM

## 2022-01-01 RX ORDER — LACTULOSE 10 G/15ML
20 SOLUTION ORAL 3 TIMES DAILY
Status: DISCONTINUED | OUTPATIENT
Start: 2022-01-01 | End: 2022-01-01

## 2022-01-01 RX ORDER — SODIUM CHLORIDE 0.9 % (FLUSH) 0.9 %
10 SYRINGE (ML) INJECTION AS NEEDED
Status: DISCONTINUED | OUTPATIENT
Start: 2022-01-01 | End: 2022-01-01 | Stop reason: HOSPADM

## 2022-01-01 RX ORDER — OXYCODONE HYDROCHLORIDE 5 MG/1
5 TABLET ORAL
Status: DISCONTINUED | OUTPATIENT
Start: 2022-01-01 | End: 2022-01-01

## 2022-01-01 RX ORDER — LORAZEPAM 2 MG/ML
0.5 INJECTION INTRAMUSCULAR ONCE
Status: COMPLETED | OUTPATIENT
Start: 2022-01-01 | End: 2022-01-01

## 2022-01-01 RX ORDER — ONDANSETRON 2 MG/ML
4 INJECTION INTRAMUSCULAR; INTRAVENOUS EVERY 6 HOURS PRN
Status: DISCONTINUED | OUTPATIENT
Start: 2022-01-01 | End: 2022-01-01 | Stop reason: HOSPADM

## 2022-01-01 RX ORDER — DEXTROSE MONOHYDRATE 25 G/50ML
25 INJECTION, SOLUTION INTRAVENOUS
Status: DISCONTINUED | OUTPATIENT
Start: 2022-01-01 | End: 2022-01-01 | Stop reason: HOSPADM

## 2022-01-01 RX ORDER — LABETALOL HYDROCHLORIDE 5 MG/ML
10 INJECTION, SOLUTION INTRAVENOUS EVERY 4 HOURS PRN
Status: DISCONTINUED | OUTPATIENT
Start: 2022-01-01 | End: 2022-01-01 | Stop reason: HOSPADM

## 2022-01-01 RX ORDER — SPIRONOLACTONE 50 MG/1
50 TABLET, FILM COATED ORAL 2 TIMES DAILY
COMMUNITY
Start: 2022-01-01 | End: 2022-01-01 | Stop reason: HOSPADM

## 2022-01-01 RX ORDER — BISACODYL 10 MG
10 SUPPOSITORY, RECTAL RECTAL DAILY PRN
Status: DISCONTINUED | OUTPATIENT
Start: 2022-01-01 | End: 2022-01-01 | Stop reason: HOSPADM

## 2022-01-01 RX ORDER — SODIUM CHLORIDE 0.9 % (FLUSH) 0.9 %
10 SYRINGE (ML) INJECTION AS NEEDED
Status: CANCELLED | OUTPATIENT
Start: 2022-01-01

## 2022-01-01 RX ORDER — PANTOPRAZOLE SODIUM 40 MG/1
40 TABLET, DELAYED RELEASE ORAL 2 TIMES DAILY
Status: DISCONTINUED | OUTPATIENT
Start: 2022-01-01 | End: 2022-01-01

## 2022-01-01 RX ORDER — ACETAMINOPHEN 325 MG/1
650 TABLET ORAL EVERY 4 HOURS PRN
Status: DISCONTINUED | OUTPATIENT
Start: 2022-01-01 | End: 2022-01-01

## 2022-01-01 RX ORDER — GANCICLOVIR 500 MG/10ML
5 INJECTION, POWDER, LYOPHILIZED, FOR SOLUTION INTRAVENOUS
Status: DISCONTINUED | OUTPATIENT
Start: 2022-01-01 | End: 2022-01-01

## 2022-01-01 RX ORDER — ACETAMINOPHEN 325 MG/1
325 TABLET ORAL EVERY 4 HOURS PRN
Status: DISCONTINUED | OUTPATIENT
Start: 2022-01-01 | End: 2022-01-01 | Stop reason: HOSPADM

## 2022-01-01 RX ORDER — SODIUM CHLORIDE, SODIUM LACTATE, POTASSIUM CHLORIDE, CALCIUM CHLORIDE 600; 310; 30; 20 MG/100ML; MG/100ML; MG/100ML; MG/100ML
100 INJECTION, SOLUTION INTRAVENOUS CONTINUOUS
Status: DISCONTINUED | OUTPATIENT
Start: 2022-01-01 | End: 2022-01-01

## 2022-01-01 RX ORDER — ACETAMINOPHEN 500 MG
1000 TABLET ORAL EVERY 8 HOURS
Status: DISCONTINUED | OUTPATIENT
Start: 2022-01-01 | End: 2022-01-01

## 2022-01-01 RX ORDER — ONDANSETRON 2 MG/ML
INJECTION INTRAMUSCULAR; INTRAVENOUS AS NEEDED
Status: DISCONTINUED | OUTPATIENT
Start: 2022-01-01 | End: 2022-01-01 | Stop reason: SURG

## 2022-01-01 RX ORDER — DEXTROSE MONOHYDRATE 100 MG/ML
25 INJECTION, SOLUTION INTRAVENOUS
Status: DISCONTINUED | OUTPATIENT
Start: 2022-01-01 | End: 2022-01-01 | Stop reason: HOSPADM

## 2022-01-01 RX ORDER — FOLIC ACID 1 MG/1
1 TABLET ORAL DAILY
Status: ON HOLD | COMMUNITY
Start: 2022-01-01 | End: 2022-01-01

## 2022-01-01 RX ORDER — SODIUM CHLORIDE, SODIUM LACTATE, POTASSIUM CHLORIDE, CALCIUM CHLORIDE 600; 310; 30; 20 MG/100ML; MG/100ML; MG/100ML; MG/100ML
9 INJECTION, SOLUTION INTRAVENOUS CONTINUOUS PRN
Status: DISCONTINUED | OUTPATIENT
Start: 2022-01-01 | End: 2022-01-01 | Stop reason: HOSPADM

## 2022-01-01 RX ORDER — SULFAMETHOXAZOLE AND TRIMETHOPRIM 800; 160 MG/1; MG/1
2 TABLET ORAL 3 TIMES DAILY
Qty: 108 TABLET | Refills: 0
Start: 2022-01-01 | End: 2022-01-01

## 2022-01-01 RX ORDER — FAMOTIDINE 20 MG/1
40 TABLET, FILM COATED ORAL DAILY
Status: DISCONTINUED | OUTPATIENT
Start: 2022-01-01 | End: 2022-01-01 | Stop reason: HOSPADM

## 2022-01-01 RX ORDER — ACETAMINOPHEN 650 MG/1
650 SUPPOSITORY RECTAL EVERY 4 HOURS PRN
Status: DISCONTINUED | OUTPATIENT
Start: 2022-01-01 | End: 2022-01-01 | Stop reason: HOSPADM

## 2022-01-01 RX ORDER — HYDROCODONE BITARTRATE AND ACETAMINOPHEN 5; 325 MG/1; MG/1
1 TABLET ORAL EVERY 4 HOURS PRN
Status: DISCONTINUED | OUTPATIENT
Start: 2022-01-01 | End: 2022-01-01

## 2022-01-01 RX ORDER — HYDROCODONE BITARTRATE AND ACETAMINOPHEN 7.5; 325 MG/1; MG/1
1-2 TABLET ORAL EVERY 4 HOURS PRN
Qty: 40 TABLET | Refills: 0 | Status: SHIPPED | OUTPATIENT
Start: 2022-01-01 | End: 2022-01-01 | Stop reason: SDUPTHER

## 2022-01-01 RX ORDER — SULFAMETHOXAZOLE AND TRIMETHOPRIM 800; 160 MG/1; MG/1
2 TABLET ORAL 3 TIMES DAILY
Status: DISCONTINUED | OUTPATIENT
Start: 2022-01-01 | End: 2022-01-01 | Stop reason: HOSPADM

## 2022-01-01 RX ORDER — ALUMINA, MAGNESIA, AND SIMETHICONE 2400; 2400; 240 MG/30ML; MG/30ML; MG/30ML
15 SUSPENSION ORAL EVERY 6 HOURS PRN
Status: DISCONTINUED | OUTPATIENT
Start: 2022-01-01 | End: 2022-01-01 | Stop reason: HOSPADM

## 2022-01-01 RX ORDER — ACETAMINOPHEN 325 MG/1
650 TABLET ORAL EVERY 4 HOURS PRN
Status: DISCONTINUED | OUTPATIENT
Start: 2022-01-01 | End: 2022-01-01 | Stop reason: HOSPADM

## 2022-01-01 RX ORDER — ACETAMINOPHEN 650 MG/1
650 SUPPOSITORY RECTAL EVERY 4 HOURS PRN
Status: DISCONTINUED | OUTPATIENT
Start: 2022-01-01 | End: 2022-01-01

## 2022-01-01 RX ORDER — TRAMADOL HYDROCHLORIDE 50 MG/1
50 TABLET ORAL ONCE
Status: COMPLETED | OUTPATIENT
Start: 2022-01-01 | End: 2022-01-01

## 2022-01-01 RX ORDER — DIPHENHYDRAMINE HCL 25 MG
25 CAPSULE ORAL EVERY 6 HOURS
Start: 2022-01-01 | End: 2022-01-01

## 2022-01-01 RX ORDER — RIZATRIPTAN BENZOATE 10 MG/1
10 TABLET ORAL ONCE AS NEEDED
Qty: 12 TABLET | Refills: 0 | Status: SHIPPED | OUTPATIENT
Start: 2022-01-01 | End: 2022-01-01

## 2022-01-01 RX ORDER — NADOLOL 20 MG/1
20 TABLET ORAL DAILY
COMMUNITY
Start: 2022-01-01 | End: 2022-01-01

## 2022-01-01 RX ORDER — ROCURONIUM BROMIDE 10 MG/ML
INJECTION, SOLUTION INTRAVENOUS AS NEEDED
Status: DISCONTINUED | OUTPATIENT
Start: 2022-01-01 | End: 2022-01-01 | Stop reason: SURG

## 2022-01-01 RX ORDER — LORAZEPAM 2 MG/ML
1 INJECTION INTRAMUSCULAR EVERY 4 HOURS PRN
Status: DISCONTINUED | OUTPATIENT
Start: 2022-01-01 | End: 2022-01-01

## 2022-01-01 RX ORDER — NICOTINE POLACRILEX 4 MG
15 LOZENGE BUCCAL
Status: DISCONTINUED | OUTPATIENT
Start: 2022-01-01 | End: 2022-01-01 | Stop reason: HOSPADM

## 2022-01-01 RX ORDER — TRAMADOL HYDROCHLORIDE 50 MG/1
25 TABLET ORAL ONCE
Status: COMPLETED | OUTPATIENT
Start: 2022-01-01 | End: 2022-01-01

## 2022-01-01 RX ORDER — BISACODYL 5 MG/1
5 TABLET, DELAYED RELEASE ORAL DAILY PRN
Status: DISCONTINUED | OUTPATIENT
Start: 2022-01-01 | End: 2022-01-01 | Stop reason: HOSPADM

## 2022-01-01 RX ORDER — CHOLECALCIFEROL (VITAMIN D3) 125 MCG
5 CAPSULE ORAL NIGHTLY PRN
Status: DISCONTINUED | OUTPATIENT
Start: 2022-01-01 | End: 2022-01-01 | Stop reason: HOSPADM

## 2022-01-01 RX ORDER — MORPHINE SULFATE 2 MG/ML
4 INJECTION, SOLUTION INTRAMUSCULAR; INTRAVENOUS
Status: DISCONTINUED | OUTPATIENT
Start: 2022-01-01 | End: 2022-01-01

## 2022-01-01 RX ORDER — FAMOTIDINE 20 MG/1
20 TABLET, FILM COATED ORAL DAILY
Status: DISCONTINUED | OUTPATIENT
Start: 2022-01-01 | End: 2022-01-01 | Stop reason: HOSPADM

## 2022-01-01 RX ORDER — BISACODYL 5 MG/1
5 TABLET, DELAYED RELEASE ORAL DAILY PRN
Status: DISCONTINUED | OUTPATIENT
Start: 2022-01-01 | End: 2022-01-01

## 2022-01-01 RX ORDER — DEXAMETHASONE SODIUM PHOSPHATE 4 MG/ML
INJECTION, SOLUTION INTRA-ARTICULAR; INTRALESIONAL; INTRAMUSCULAR; INTRAVENOUS; SOFT TISSUE AS NEEDED
Status: DISCONTINUED | OUTPATIENT
Start: 2022-01-01 | End: 2022-01-01 | Stop reason: SURG

## 2022-01-01 RX ORDER — TRANEXAMIC ACID 10 MG/ML
INJECTION, SOLUTION INTRAVENOUS CONTINUOUS PRN
Status: COMPLETED | OUTPATIENT
Start: 2022-01-01 | End: 2022-01-01

## 2022-01-01 RX ORDER — HYDROCODONE BITARTRATE AND ACETAMINOPHEN 5; 325 MG/1; MG/1
1 TABLET ORAL EVERY 6 HOURS PRN
Qty: 15 TABLET | Refills: 0 | Status: SHIPPED | OUTPATIENT
Start: 2022-01-01 | End: 2022-01-01

## 2022-01-01 RX ORDER — HYDROCODONE BITARTRATE AND ACETAMINOPHEN 5; 325 MG/1; MG/1
1 TABLET ORAL EVERY 4 HOURS PRN
Status: DISCONTINUED | OUTPATIENT
Start: 2022-01-01 | End: 2022-01-01 | Stop reason: HOSPADM

## 2022-01-01 RX ORDER — HYDROCODONE BITARTRATE AND ACETAMINOPHEN 5; 325 MG/1; MG/1
1 TABLET ORAL EVERY 4 HOURS PRN
Status: DISCONTINUED | OUTPATIENT
Start: 2022-01-01 | End: 2022-01-01 | Stop reason: SDUPTHER

## 2022-01-01 RX ORDER — FAMOTIDINE 20 MG/1
20 TABLET, FILM COATED ORAL DAILY
Start: 2022-01-01 | End: 2022-01-01

## 2022-01-01 RX ORDER — ONDANSETRON 4 MG/1
4 TABLET, FILM COATED ORAL EVERY 6 HOURS PRN
COMMUNITY
Start: 2022-01-01 | End: 2022-01-01 | Stop reason: HOSPADM

## 2022-01-01 RX ORDER — OLANZAPINE 2.5 MG/1
2.5 TABLET ORAL 2 TIMES DAILY
Status: DISCONTINUED | OUTPATIENT
Start: 2022-01-01 | End: 2022-01-01

## 2022-01-01 RX ORDER — AMOXICILLIN 250 MG
2 CAPSULE ORAL 2 TIMES DAILY
Status: DISCONTINUED | OUTPATIENT
Start: 2022-01-01 | End: 2022-01-01 | Stop reason: HOSPADM

## 2022-01-01 RX ORDER — INSULIN LISPRO 100 [IU]/ML
0-7 INJECTION, SOLUTION INTRAVENOUS; SUBCUTANEOUS
Status: DISCONTINUED | OUTPATIENT
Start: 2022-01-01 | End: 2022-01-01 | Stop reason: HOSPADM

## 2022-01-01 RX ORDER — CLINDAMYCIN PHOSPHATE 600 MG/50ML
600 INJECTION INTRAVENOUS ONCE
Status: COMPLETED | OUTPATIENT
Start: 2022-01-01 | End: 2022-01-01

## 2022-01-01 RX ORDER — FENTANYL CITRATE 50 UG/ML
INJECTION, SOLUTION INTRAMUSCULAR; INTRAVENOUS AS NEEDED
Status: DISCONTINUED | OUTPATIENT
Start: 2022-01-01 | End: 2022-01-01 | Stop reason: SURG

## 2022-01-01 RX ORDER — PHENYLEPHRINE HCL IN 0.9% NACL 1 MG/10 ML
SYRINGE (ML) INTRAVENOUS AS NEEDED
Status: DISCONTINUED | OUTPATIENT
Start: 2022-01-01 | End: 2022-01-01 | Stop reason: SURG

## 2022-01-01 RX ORDER — LIDOCAINE HYDROCHLORIDE 20 MG/ML
INJECTION, SOLUTION INFILTRATION; PERINEURAL AS NEEDED
Status: DISCONTINUED | OUTPATIENT
Start: 2022-01-01 | End: 2022-01-01 | Stop reason: SURG

## 2022-01-01 RX ORDER — HYDRALAZINE HYDROCHLORIDE 20 MG/ML
10 INJECTION INTRAMUSCULAR; INTRAVENOUS EVERY 6 HOURS PRN
Status: DISCONTINUED | OUTPATIENT
Start: 2022-01-01 | End: 2022-01-01

## 2022-01-01 RX ORDER — PRIMAQUINE PHOSPHATE 15 MG/1
30 TABLET, FILM COATED ORAL ONCE
Status: COMPLETED | OUTPATIENT
Start: 2022-01-01 | End: 2022-01-01

## 2022-01-01 RX ORDER — NITROGLYCERIN 0.4 MG/1
0.4 TABLET SUBLINGUAL
Status: DISCONTINUED | OUTPATIENT
Start: 2022-01-01 | End: 2022-01-01 | Stop reason: HOSPADM

## 2022-01-01 RX ORDER — FUROSEMIDE 10 MG/ML
40 INJECTION INTRAMUSCULAR; INTRAVENOUS EVERY 6 HOURS
Start: 2022-01-01 | End: 2022-01-01

## 2022-01-01 RX ORDER — BISACODYL 10 MG
10 SUPPOSITORY, RECTAL RECTAL DAILY PRN
Status: DISCONTINUED | OUTPATIENT
Start: 2022-01-01 | End: 2022-01-01 | Stop reason: SDUPTHER

## 2022-01-01 RX ORDER — ONDANSETRON 2 MG/ML
4 INJECTION INTRAMUSCULAR; INTRAVENOUS ONCE AS NEEDED
Status: DISCONTINUED | OUTPATIENT
Start: 2022-01-01 | End: 2022-01-01

## 2022-01-01 RX ORDER — GLYCOPYRROLATE 0.2 MG/ML
INJECTION INTRAMUSCULAR; INTRAVENOUS AS NEEDED
Status: DISCONTINUED | OUTPATIENT
Start: 2022-01-01 | End: 2022-01-01 | Stop reason: SURG

## 2022-01-01 RX ORDER — SODIUM CHLORIDE 9 MG/ML
75 INJECTION, SOLUTION INTRAVENOUS CONTINUOUS
Status: DISCONTINUED | OUTPATIENT
Start: 2022-01-01 | End: 2022-01-01

## 2022-01-01 RX ORDER — KETOROLAC TROMETHAMINE 30 MG/ML
30 INJECTION, SOLUTION INTRAMUSCULAR; INTRAVENOUS ONCE
Status: COMPLETED | OUTPATIENT
Start: 2022-01-01 | End: 2022-01-01

## 2022-01-01 RX ORDER — HYDROCODONE BITARTRATE AND ACETAMINOPHEN 7.5; 325 MG/1; MG/1
1 TABLET ORAL EVERY 4 HOURS PRN
Status: DISCONTINUED | OUTPATIENT
Start: 2022-01-01 | End: 2022-01-01 | Stop reason: HOSPADM

## 2022-01-01 RX ORDER — KETOROLAC TROMETHAMINE 30 MG/ML
30 INJECTION, SOLUTION INTRAMUSCULAR; INTRAVENOUS EVERY 6 HOURS PRN
Status: DISPENSED | OUTPATIENT
Start: 2022-01-01 | End: 2022-01-01

## 2022-01-01 RX ORDER — MEPERIDINE HYDROCHLORIDE 25 MG/ML
12.5 INJECTION INTRAMUSCULAR; INTRAVENOUS; SUBCUTANEOUS
Status: DISCONTINUED | OUTPATIENT
Start: 2022-01-01 | End: 2022-01-01

## 2022-01-01 RX ORDER — HYDROCODONE BITARTRATE AND ACETAMINOPHEN 7.5; 325 MG/1; MG/1
2 TABLET ORAL EVERY 4 HOURS PRN
Status: DISCONTINUED | OUTPATIENT
Start: 2022-01-01 | End: 2022-01-01 | Stop reason: HOSPADM

## 2022-01-01 RX ORDER — AMITRIPTYLINE HYDROCHLORIDE 10 MG/1
10 TABLET, FILM COATED ORAL NIGHTLY
Status: DISCONTINUED | OUTPATIENT
Start: 2022-01-01 | End: 2022-01-01 | Stop reason: HOSPADM

## 2022-01-01 RX ORDER — NALOXONE HYDROCHLORIDE 1 MG/ML
INJECTION INTRAMUSCULAR; INTRAVENOUS; SUBCUTANEOUS
Status: DISPENSED
Start: 2022-01-01 | End: 2022-01-01

## 2022-01-01 RX ORDER — FUROSEMIDE 10 MG/ML
40 INJECTION INTRAMUSCULAR; INTRAVENOUS EVERY 6 HOURS
Status: DISCONTINUED | OUTPATIENT
Start: 2022-01-01 | End: 2022-01-01 | Stop reason: HOSPADM

## 2022-01-01 RX ORDER — APIXABAN 2.5 MG/1
2.5 TABLET, FILM COATED ORAL 2 TIMES DAILY
COMMUNITY
Start: 2022-01-01 | End: 2022-01-01 | Stop reason: HOSPADM

## 2022-01-01 RX ORDER — LACTULOSE 10 G/15ML
30 SOLUTION ORAL 3 TIMES DAILY
Status: DISCONTINUED | OUTPATIENT
Start: 2022-01-01 | End: 2022-01-01 | Stop reason: HOSPADM

## 2022-01-01 RX ORDER — ONDANSETRON 4 MG/1
4 TABLET, FILM COATED ORAL EVERY 6 HOURS PRN
Status: DISCONTINUED | OUTPATIENT
Start: 2022-01-01 | End: 2022-01-01

## 2022-01-01 RX ORDER — KETOROLAC TROMETHAMINE 30 MG/ML
15 INJECTION, SOLUTION INTRAMUSCULAR; INTRAVENOUS EVERY 6 HOURS SCHEDULED
Status: COMPLETED | OUTPATIENT
Start: 2022-01-01 | End: 2022-01-01

## 2022-01-01 RX ORDER — LACTULOSE 10 G/15ML
20 SOLUTION ORAL 3 TIMES DAILY PRN
COMMUNITY
Start: 2022-01-01

## 2022-01-01 RX ORDER — PANTOPRAZOLE SODIUM 40 MG/10ML
40 INJECTION, POWDER, LYOPHILIZED, FOR SOLUTION INTRAVENOUS EVERY 12 HOURS SCHEDULED
Status: DISCONTINUED | OUTPATIENT
Start: 2022-01-01 | End: 2022-01-01 | Stop reason: HOSPADM

## 2022-01-01 RX ORDER — PANTOPRAZOLE SODIUM 40 MG/1
40 TABLET, DELAYED RELEASE ORAL
Status: DISCONTINUED | OUTPATIENT
Start: 2022-01-01 | End: 2022-01-01 | Stop reason: HOSPADM

## 2022-01-01 RX ORDER — ACETAMINOPHEN 160 MG/5ML
650 SOLUTION ORAL EVERY 4 HOURS PRN
Status: DISCONTINUED | OUTPATIENT
Start: 2022-01-01 | End: 2022-01-01 | Stop reason: HOSPADM

## 2022-01-01 RX ORDER — DIPHENHYDRAMINE HCL 25 MG
25 CAPSULE ORAL EVERY 6 HOURS
Status: DISCONTINUED | OUTPATIENT
Start: 2022-01-01 | End: 2022-01-01 | Stop reason: HOSPADM

## 2022-01-01 RX ORDER — HYDROCODONE BITARTRATE AND ACETAMINOPHEN 7.5; 325 MG/1; MG/1
1 TABLET ORAL EVERY 4 HOURS PRN
Qty: 42 TABLET | Refills: 0 | Status: SHIPPED | OUTPATIENT
Start: 2022-01-01 | End: 2022-01-01

## 2022-01-01 RX ORDER — NALOXONE HYDROCHLORIDE 1 MG/ML
4 INJECTION INTRAMUSCULAR; INTRAVENOUS; SUBCUTANEOUS ONCE
Status: COMPLETED | OUTPATIENT
Start: 2022-01-01 | End: 2022-01-01

## 2022-01-01 RX ORDER — KETAMINE HYDROCHLORIDE 50 MG/ML
INJECTION, SOLUTION, CONCENTRATE INTRAMUSCULAR; INTRAVENOUS AS NEEDED
Status: DISCONTINUED | OUTPATIENT
Start: 2022-01-01 | End: 2022-01-01 | Stop reason: SURG

## 2022-01-01 RX ORDER — KETOROLAC TROMETHAMINE 15 MG/ML
15 INJECTION, SOLUTION INTRAMUSCULAR; INTRAVENOUS EVERY 6 HOURS PRN
Status: COMPLETED | OUTPATIENT
Start: 2022-01-01 | End: 2022-01-01

## 2022-01-01 RX ORDER — MORPHINE SULFATE 2 MG/ML
1 INJECTION, SOLUTION INTRAMUSCULAR; INTRAVENOUS ONCE
Status: COMPLETED | OUTPATIENT
Start: 2022-01-01 | End: 2022-01-01

## 2022-01-01 RX ORDER — KETOROLAC TROMETHAMINE 15 MG/ML
15 INJECTION, SOLUTION INTRAMUSCULAR; INTRAVENOUS ONCE
Status: COMPLETED | OUTPATIENT
Start: 2022-01-01 | End: 2022-01-01

## 2022-01-01 RX ORDER — SODIUM CHLORIDE 9 MG/ML
100 INJECTION, SOLUTION INTRAVENOUS CONTINUOUS
Status: DISCONTINUED | OUTPATIENT
Start: 2022-01-01 | End: 2022-01-01

## 2022-01-01 RX ORDER — ONDANSETRON 2 MG/ML
4 INJECTION INTRAMUSCULAR; INTRAVENOUS EVERY 6 HOURS PRN
Status: DISCONTINUED | OUTPATIENT
Start: 2022-01-01 | End: 2022-01-01 | Stop reason: SDUPTHER

## 2022-01-01 RX ORDER — PANTOPRAZOLE SODIUM 40 MG/1
40 TABLET, DELAYED RELEASE ORAL 2 TIMES DAILY
COMMUNITY
Start: 2022-01-01 | End: 2022-01-01 | Stop reason: HOSPADM

## 2022-01-01 RX ORDER — DEXMEDETOMIDINE HYDROCHLORIDE 100 UG/ML
INJECTION, SOLUTION INTRAVENOUS AS NEEDED
Status: DISCONTINUED | OUTPATIENT
Start: 2022-01-01 | End: 2022-01-01 | Stop reason: SURG

## 2022-01-01 RX ORDER — PROMETHAZINE HYDROCHLORIDE 12.5 MG/1
12.5 SUPPOSITORY RECTAL EVERY 6 HOURS PRN
Status: DISCONTINUED | OUTPATIENT
Start: 2022-01-01 | End: 2022-01-01 | Stop reason: HOSPADM

## 2022-01-01 RX ORDER — MORPHINE SULFATE 100 MG/5ML
10 SOLUTION ORAL
Qty: 30 ML | Refills: 0 | Status: SHIPPED | OUTPATIENT
Start: 2022-01-01

## 2022-01-01 RX ADMIN — SODIUM CHLORIDE 500 ML: 9 INJECTION, SOLUTION INTRAVENOUS at 10:38

## 2022-01-01 RX ADMIN — POTASSIUM CHLORIDE 40 MEQ: 10 CAPSULE, COATED, EXTENDED RELEASE ORAL at 08:17

## 2022-01-01 RX ADMIN — MEROPENEM 1 G: 1 INJECTION, POWDER, FOR SOLUTION INTRAVENOUS at 02:55

## 2022-01-01 RX ADMIN — INSULIN LISPRO 5 UNITS: 100 INJECTION, SOLUTION INTRAVENOUS; SUBCUTANEOUS at 18:02

## 2022-01-01 RX ADMIN — LIDOCAINE HYDROCHLORIDE 100 MG: 20 INJECTION, SOLUTION INFILTRATION; PERINEURAL at 07:22

## 2022-01-01 RX ADMIN — METOPROLOL TARTRATE 50 MG: 25 TABLET, FILM COATED ORAL at 14:09

## 2022-01-01 RX ADMIN — PANTOPRAZOLE SODIUM 40 MG: 40 INJECTION, POWDER, FOR SOLUTION INTRAVENOUS at 09:17

## 2022-01-01 RX ADMIN — HYDROCODONE BITARTRATE AND ACETAMINOPHEN 2 TABLET: 7.5; 325 TABLET ORAL at 21:16

## 2022-01-01 RX ADMIN — MEROPENEM 1 G: 1 INJECTION, POWDER, FOR SOLUTION INTRAVENOUS at 01:53

## 2022-01-01 RX ADMIN — SULFAMETHOXAZOLE AND TRIMETHOPRIM 294.4 MG OF TRIMETHOPRIM: 80; 16 INJECTION, SOLUTION, CONCENTRATE INTRAVENOUS at 00:09

## 2022-01-01 RX ADMIN — MEROPENEM 2 G: 1 INJECTION, POWDER, FOR SOLUTION INTRAVENOUS at 09:55

## 2022-01-01 RX ADMIN — KETOROLAC TROMETHAMINE 15 MG: 15 INJECTION, SOLUTION INTRAMUSCULAR; INTRAVENOUS at 06:34

## 2022-01-01 RX ADMIN — HYDROCODONE BITARTRATE AND ACETAMINOPHEN 2 TABLET: 7.5; 325 TABLET ORAL at 04:37

## 2022-01-01 RX ADMIN — METHYLPREDNISOLONE SODIUM SUCCINATE 125 MG: 125 INJECTION, POWDER, FOR SOLUTION INTRAMUSCULAR; INTRAVENOUS at 00:02

## 2022-01-01 RX ADMIN — VANCOMYCIN HYDROCHLORIDE 1000 MG: 5 INJECTION, POWDER, LYOPHILIZED, FOR SOLUTION INTRAVENOUS at 06:51

## 2022-01-01 RX ADMIN — FAMOTIDINE 40 MG: 20 TABLET ORAL at 08:57

## 2022-01-01 RX ADMIN — EPHEDRINE SULFATE 15 MG: 50 INJECTION INTRAVENOUS at 07:51

## 2022-01-01 RX ADMIN — ACETAMINOPHEN 650 MG: 325 TABLET ORAL at 07:23

## 2022-01-01 RX ADMIN — HYDROCODONE BITARTRATE AND ACETAMINOPHEN 1 TABLET: 5; 325 TABLET ORAL at 20:55

## 2022-01-01 RX ADMIN — PRIMAQUINE PHOSPHATE 30 MG OF PRIMAQUINE BASE: 15 TABLET, FILM COATED ORAL at 21:38

## 2022-01-01 RX ADMIN — SODIUM CHLORIDE: 9 INJECTION, SOLUTION INTRAVENOUS at 08:17

## 2022-01-01 RX ADMIN — DIPHENHYDRAMINE HYDROCHLORIDE 25 MG: 25 CAPSULE ORAL at 06:34

## 2022-01-01 RX ADMIN — LACTULOSE 30 G: 20 SOLUTION ORAL at 09:17

## 2022-01-01 RX ADMIN — GANCICLOVIR 150 MG: 500 INJECTION, POWDER, LYOPHILIZED, FOR SOLUTION INTRAVENOUS at 09:18

## 2022-01-01 RX ADMIN — LABETALOL 20 MG/4 ML (5 MG/ML) INTRAVENOUS SYRINGE 10 MG: at 00:51

## 2022-01-01 RX ADMIN — AMITRIPTYLINE HYDROCHLORIDE 10 MG: 10 TABLET, FILM COATED ORAL at 20:44

## 2022-01-01 RX ADMIN — HYDROMORPHONE HYDROCHLORIDE 1 MG: 1 INJECTION, SOLUTION INTRAMUSCULAR; INTRAVENOUS; SUBCUTANEOUS at 17:36

## 2022-01-01 RX ADMIN — FUROSEMIDE 40 MG: 10 INJECTION, SOLUTION INTRAMUSCULAR; INTRAVENOUS at 01:23

## 2022-01-01 RX ADMIN — SULFAMETHOXAZOLE AND TRIMETHOPRIM 294.4 MG OF TRIMETHOPRIM: 80; 16 INJECTION, SOLUTION, CONCENTRATE INTRAVENOUS at 17:57

## 2022-01-01 RX ADMIN — KETOROLAC TROMETHAMINE 15 MG: 15 INJECTION, SOLUTION INTRAMUSCULAR; INTRAVENOUS at 12:37

## 2022-01-01 RX ADMIN — Medication 10 ML: at 09:17

## 2022-01-01 RX ADMIN — Medication 300 MCG: at 08:51

## 2022-01-01 RX ADMIN — FUROSEMIDE 40 MG: 10 INJECTION, SOLUTION INTRAMUSCULAR; INTRAVENOUS at 12:36

## 2022-01-01 RX ADMIN — DIPHENHYDRAMINE HYDROCHLORIDE 25 MG: 25 CAPSULE ORAL at 12:34

## 2022-01-01 RX ADMIN — HYDROCODONE BITARTRATE AND ACETAMINOPHEN 1 TABLET: 7.5; 325 TABLET ORAL at 08:57

## 2022-01-01 RX ADMIN — MAGNESIUM SULFATE HEPTAHYDRATE 2 G: 2 INJECTION, SOLUTION INTRAVENOUS at 11:00

## 2022-01-01 RX ADMIN — VANCOMYCIN HYDROCHLORIDE 1250 MG: 5 INJECTION, POWDER, LYOPHILIZED, FOR SOLUTION INTRAVENOUS at 12:55

## 2022-01-01 RX ADMIN — PANTOPRAZOLE SODIUM 40 MG: 40 TABLET, DELAYED RELEASE ORAL at 16:33

## 2022-01-01 RX ADMIN — PANTOPRAZOLE SODIUM 40 MG: 40 TABLET, DELAYED RELEASE ORAL at 09:31

## 2022-01-01 RX ADMIN — Medication 10 ML: at 08:06

## 2022-01-01 RX ADMIN — METOPROLOL TARTRATE 50 MG: 25 TABLET, FILM COATED ORAL at 05:40

## 2022-01-01 RX ADMIN — VANCOMYCIN HYDROCHLORIDE 1250 MG: 5 INJECTION, POWDER, LYOPHILIZED, FOR SOLUTION INTRAVENOUS at 14:09

## 2022-01-01 RX ADMIN — SENNOSIDES AND DOCUSATE SODIUM 2 TABLET: 50; 8.6 TABLET ORAL at 21:10

## 2022-01-01 RX ADMIN — LACTULOSE 30 G: 20 SOLUTION ORAL at 20:55

## 2022-01-01 RX ADMIN — MEROPENEM 1 G: 1 INJECTION, POWDER, FOR SOLUTION INTRAVENOUS at 12:10

## 2022-01-01 RX ADMIN — INSULIN HUMAN 4.2 UNITS/HR: 1 INJECTION, SOLUTION INTRAVENOUS at 00:15

## 2022-01-01 RX ADMIN — HYDROCODONE BITARTRATE AND ACETAMINOPHEN 2 TABLET: 7.5; 325 TABLET ORAL at 20:43

## 2022-01-01 RX ADMIN — MORPHINE SULFATE 1 MG: 2 INJECTION, SOLUTION INTRAMUSCULAR; INTRAVENOUS at 06:09

## 2022-01-01 RX ADMIN — MEROPENEM 1 G: 1 INJECTION, POWDER, FOR SOLUTION INTRAVENOUS at 21:38

## 2022-01-01 RX ADMIN — KETAMINE HYDROCHLORIDE 20 MG: 50 INJECTION, SOLUTION INTRAMUSCULAR; INTRAVENOUS at 07:41

## 2022-01-01 RX ADMIN — MEROPENEM 1 G: 1 INJECTION, POWDER, FOR SOLUTION INTRAVENOUS at 10:21

## 2022-01-01 RX ADMIN — METOPROLOL TARTRATE 50 MG: 25 TABLET, FILM COATED ORAL at 06:10

## 2022-01-01 RX ADMIN — DEXMEDETOMIDINE 20 MCG: 100 INJECTION, SOLUTION, CONCENTRATE INTRAVENOUS at 07:27

## 2022-01-01 RX ADMIN — Medication 10 ML: at 20:56

## 2022-01-01 RX ADMIN — LACTULOSE 30 G: 20 SOLUTION ORAL at 11:01

## 2022-01-01 RX ADMIN — ACETAMINOPHEN 650 MG: 325 TABLET ORAL at 10:13

## 2022-01-01 RX ADMIN — SENNOSIDES AND DOCUSATE SODIUM 2 TABLET: 50; 8.6 TABLET ORAL at 20:53

## 2022-01-01 RX ADMIN — DAPSONE 100 MG: 100 TABLET ORAL at 09:03

## 2022-01-01 RX ADMIN — KETOROLAC TROMETHAMINE 15 MG: 30 INJECTION, SOLUTION INTRAMUSCULAR; INTRAVENOUS at 13:05

## 2022-01-01 RX ADMIN — SODIUM CHLORIDE, PRESERVATIVE FREE 10 ML: 5 INJECTION INTRAVENOUS at 09:04

## 2022-01-01 RX ADMIN — VANCOMYCIN HYDROCHLORIDE 1500 MG: 5 INJECTION, POWDER, LYOPHILIZED, FOR SOLUTION INTRAVENOUS at 10:32

## 2022-01-01 RX ADMIN — RIFAXIMIN 550 MG: 550 TABLET ORAL at 12:01

## 2022-01-01 RX ADMIN — HYDROCODONE BITARTRATE AND ACETAMINOPHEN 2 TABLET: 7.5; 325 TABLET ORAL at 13:01

## 2022-01-01 RX ADMIN — SODIUM CHLORIDE 1396 ML: 9 INJECTION, SOLUTION INTRAVENOUS at 12:07

## 2022-01-01 RX ADMIN — Medication 10 ML: at 21:02

## 2022-01-01 RX ADMIN — SODIUM CHLORIDE 100 ML/HR: 9 INJECTION, SOLUTION INTRAVENOUS at 02:48

## 2022-01-01 RX ADMIN — PROPOFOL 100 MG: 10 INJECTION, EMULSION INTRAVENOUS at 07:22

## 2022-01-01 RX ADMIN — SODIUM CHLORIDE 1000 ML: 9 INJECTION, SOLUTION INTRAVENOUS at 19:57

## 2022-01-01 RX ADMIN — KETOROLAC TROMETHAMINE 15 MG: 15 INJECTION, SOLUTION INTRAMUSCULAR; INTRAVENOUS at 18:34

## 2022-01-01 RX ADMIN — FAMOTIDINE 20 MG: 20 TABLET ORAL at 12:33

## 2022-01-01 RX ADMIN — HYDROCODONE BITARTRATE AND ACETAMINOPHEN 1 TABLET: 7.5; 325 TABLET ORAL at 13:03

## 2022-01-01 RX ADMIN — APIXABAN 2.5 MG: 2.5 TABLET, FILM COATED ORAL at 09:17

## 2022-01-01 RX ADMIN — SENNOSIDES AND DOCUSATE SODIUM 2 TABLET: 50; 8.6 TABLET ORAL at 20:54

## 2022-01-01 RX ADMIN — ENOXAPARIN SODIUM 30 MG: 30 INJECTION SUBCUTANEOUS at 09:03

## 2022-01-01 RX ADMIN — MAGNESIUM SULFATE 2 G: 2 INJECTION INTRAVENOUS at 09:31

## 2022-01-01 RX ADMIN — ROCURONIUM BROMIDE 50 MG: 10 INJECTION INTRAVENOUS at 07:22

## 2022-01-01 RX ADMIN — HYDROMORPHONE HYDROCHLORIDE 0.5 MG: 1 INJECTION, SOLUTION INTRAMUSCULAR; INTRAVENOUS; SUBCUTANEOUS at 23:38

## 2022-01-01 RX ADMIN — PANTOPRAZOLE SODIUM 40 MG: 40 INJECTION, POWDER, FOR SOLUTION INTRAVENOUS at 20:54

## 2022-01-01 RX ADMIN — KETOROLAC TROMETHAMINE 30 MG: 30 INJECTION, SOLUTION INTRAMUSCULAR; INTRAVENOUS at 04:58

## 2022-01-01 RX ADMIN — SENNOSIDES AND DOCUSATE SODIUM 2 TABLET: 50; 8.6 TABLET ORAL at 08:57

## 2022-01-01 RX ADMIN — KETOROLAC TROMETHAMINE 30 MG: 30 INJECTION, SOLUTION INTRAMUSCULAR; INTRAVENOUS at 23:38

## 2022-01-01 RX ADMIN — SODIUM CHLORIDE: 9 INJECTION, SOLUTION INTRAVENOUS at 07:05

## 2022-01-01 RX ADMIN — METHYLPREDNISOLONE SODIUM SUCCINATE 125 MG: 125 INJECTION, POWDER, FOR SOLUTION INTRAMUSCULAR; INTRAVENOUS at 12:35

## 2022-01-01 RX ADMIN — KETOROLAC TROMETHAMINE 15 MG: 30 INJECTION, SOLUTION INTRAMUSCULAR; INTRAVENOUS at 18:01

## 2022-01-01 RX ADMIN — RIFAXIMIN 550 MG: 550 TABLET ORAL at 20:54

## 2022-01-01 RX ADMIN — FAMOTIDINE 20 MG: 20 TABLET ORAL at 08:17

## 2022-01-01 RX ADMIN — FAMOTIDINE 40 MG: 20 TABLET ORAL at 09:03

## 2022-01-01 RX ADMIN — DIPHENHYDRAMINE HYDROCHLORIDE 25 MG: 25 CAPSULE ORAL at 00:02

## 2022-01-01 RX ADMIN — SULFAMETHOXAZOLE AND TRIMETHOPRIM 294.4 MG OF TRIMETHOPRIM: 80; 16 INJECTION, SOLUTION, CONCENTRATE INTRAVENOUS at 07:23

## 2022-01-01 RX ADMIN — SODIUM CHLORIDE, PRESERVATIVE FREE 10 ML: 5 INJECTION INTRAVENOUS at 21:00

## 2022-01-01 RX ADMIN — HYDROMORPHONE HYDROCHLORIDE 1 MG: 1 INJECTION, SOLUTION INTRAMUSCULAR; INTRAVENOUS; SUBCUTANEOUS at 19:46

## 2022-01-01 RX ADMIN — HYDROCODONE BITARTRATE AND ACETAMINOPHEN 2 TABLET: 7.5; 325 TABLET ORAL at 09:03

## 2022-01-01 RX ADMIN — FUROSEMIDE 40 MG: 10 INJECTION, SOLUTION INTRAMUSCULAR; INTRAVENOUS at 07:24

## 2022-01-01 RX ADMIN — FUROSEMIDE 40 MG: 10 INJECTION, SOLUTION INTRAMUSCULAR; INTRAVENOUS at 21:02

## 2022-01-01 RX ADMIN — VANCOMYCIN HYDROCHLORIDE 1000 MG: 5 INJECTION, POWDER, LYOPHILIZED, FOR SOLUTION INTRAVENOUS at 18:02

## 2022-01-01 RX ADMIN — POTASSIUM CHLORIDE 40 MEQ: 10 CAPSULE, COATED, EXTENDED RELEASE ORAL at 21:09

## 2022-01-01 RX ADMIN — Medication 200 MCG: at 07:36

## 2022-01-01 RX ADMIN — TRAMADOL HYDROCHLORIDE 25 MG: 50 TABLET ORAL at 02:44

## 2022-01-01 RX ADMIN — Medication 10 ML: at 08:20

## 2022-01-01 RX ADMIN — HYDROMORPHONE HYDROCHLORIDE 0.5 MG: 1 INJECTION, SOLUTION INTRAMUSCULAR; INTRAVENOUS; SUBCUTANEOUS at 04:58

## 2022-01-01 RX ADMIN — LACTULOSE 30 G: 20 SOLUTION ORAL at 01:15

## 2022-01-01 RX ADMIN — HYDROCODONE BITARTRATE AND ACETAMINOPHEN 1 TABLET: 5; 325 TABLET ORAL at 03:34

## 2022-01-01 RX ADMIN — FUROSEMIDE 40 MG: 10 INJECTION, SOLUTION INTRAMUSCULAR; INTRAVENOUS at 07:29

## 2022-01-01 RX ADMIN — LORAZEPAM 0.5 MG: 2 INJECTION INTRAMUSCULAR; INTRAVENOUS at 11:03

## 2022-01-01 RX ADMIN — KETOROLAC TROMETHAMINE 15 MG: 30 INJECTION, SOLUTION INTRAMUSCULAR; INTRAVENOUS at 06:32

## 2022-01-01 RX ADMIN — AMITRIPTYLINE HYDROCHLORIDE 10 MG: 10 TABLET, FILM COATED ORAL at 21:16

## 2022-01-01 RX ADMIN — EMTRICITABINE AND TENOFOVIR DISOPROXIL FUMARATE 1 TABLET: 200; 300 TABLET, FILM COATED ORAL at 10:17

## 2022-01-01 RX ADMIN — Medication 10 ML: at 00:17

## 2022-01-01 RX ADMIN — SUGAMMADEX 400 MG: 100 INJECTION, SOLUTION INTRAVENOUS at 08:39

## 2022-01-01 RX ADMIN — DEXAMETHASONE SODIUM PHOSPHATE 8 MG: 4 INJECTION INTRA-ARTICULAR; INTRALESIONAL; INTRAMUSCULAR; INTRAVENOUS; SOFT TISSUE at 07:23

## 2022-01-01 RX ADMIN — BICTEGRAVIR SODIUM, EMTRICITABINE, AND TENOFOVIR ALAFENAMIDE FUMARATE 1 TABLET: 50; 200; 25 TABLET ORAL at 08:15

## 2022-01-01 RX ADMIN — DOLUTEGRAVIR SODIUM 50 MG: 50 TABLET, FILM COATED ORAL at 10:17

## 2022-01-01 RX ADMIN — KETOROLAC TROMETHAMINE 15 MG: 30 INJECTION, SOLUTION INTRAMUSCULAR; INTRAVENOUS at 00:15

## 2022-01-01 RX ADMIN — FENTANYL CITRATE 100 MCG: 50 INJECTION, SOLUTION INTRAMUSCULAR; INTRAVENOUS at 07:22

## 2022-01-01 RX ADMIN — KETOROLAC TROMETHAMINE 30 MG: 30 INJECTION, SOLUTION INTRAMUSCULAR; INTRAVENOUS at 17:36

## 2022-01-01 RX ADMIN — SULFAMETHOXAZOLE AND TRIMETHOPRIM 294.4 MG OF TRIMETHOPRIM: 80; 16 INJECTION, SOLUTION, CONCENTRATE INTRAVENOUS at 12:40

## 2022-01-01 RX ADMIN — SENNOSIDES AND DOCUSATE SODIUM 2 TABLET: 50; 8.6 TABLET ORAL at 21:17

## 2022-01-01 RX ADMIN — SENNOSIDES AND DOCUSATE SODIUM 2 TABLET: 50; 8.6 TABLET ORAL at 09:03

## 2022-01-01 RX ADMIN — NALOXONE HYDROCHLORIDE 4 MG: 1 INJECTION PARENTERAL at 10:10

## 2022-01-01 RX ADMIN — DAPSONE 100 MG: 100 TABLET ORAL at 10:09

## 2022-01-01 RX ADMIN — Medication 10 ML: at 02:45

## 2022-01-01 RX ADMIN — METOPROLOL TARTRATE 50 MG: 25 TABLET, FILM COATED ORAL at 20:54

## 2022-01-01 RX ADMIN — KETOROLAC TROMETHAMINE 15 MG: 15 INJECTION, SOLUTION INTRAMUSCULAR; INTRAVENOUS at 00:19

## 2022-01-01 RX ADMIN — ROCURONIUM BROMIDE 50 MG: 10 INJECTION INTRAVENOUS at 08:14

## 2022-01-01 RX ADMIN — ACETAMINOPHEN 1000 MG: 500 TABLET ORAL at 09:17

## 2022-01-01 RX ADMIN — Medication 10 ML: at 08:19

## 2022-01-01 RX ADMIN — INSULIN LISPRO 2 UNITS: 100 INJECTION, SOLUTION INTRAVENOUS; SUBCUTANEOUS at 11:20

## 2022-01-01 RX ADMIN — APIXABAN 2.5 MG: 2.5 TABLET, FILM COATED ORAL at 20:55

## 2022-01-01 RX ADMIN — METHYLPREDNISOLONE SODIUM SUCCINATE 125 MG: 125 INJECTION, POWDER, FOR SOLUTION INTRAMUSCULAR; INTRAVENOUS at 00:32

## 2022-01-01 RX ADMIN — ONDANSETRON 4 MG: 2 INJECTION INTRAMUSCULAR; INTRAVENOUS at 08:37

## 2022-01-01 RX ADMIN — HYDROCODONE BITARTRATE AND ACETAMINOPHEN 2 TABLET: 7.5; 325 TABLET ORAL at 04:19

## 2022-01-01 RX ADMIN — BICTEGRAVIR SODIUM, EMTRICITABINE, AND TENOFOVIR ALAFENAMIDE FUMARATE 1 TABLET: 50; 200; 25 TABLET ORAL at 10:09

## 2022-01-01 RX ADMIN — PANTOPRAZOLE SODIUM 40 MG: 40 TABLET, DELAYED RELEASE ORAL at 07:29

## 2022-01-01 RX ADMIN — DIPHENHYDRAMINE HYDROCHLORIDE 25 MG: 25 CAPSULE ORAL at 17:56

## 2022-01-01 RX ADMIN — Medication 200 MCG: at 08:46

## 2022-01-01 RX ADMIN — ACETAMINOPHEN 1000 MG: 500 TABLET ORAL at 13:05

## 2022-01-01 RX ADMIN — FAMOTIDINE 40 MG: 20 TABLET ORAL at 13:06

## 2022-01-01 RX ADMIN — CLINDAMYCIN IN 5 PERCENT DEXTROSE 600 MG: 12 INJECTION, SOLUTION INTRAVENOUS at 21:18

## 2022-01-01 RX ADMIN — OXYCODONE HYDROCHLORIDE 5 MG: 5 TABLET ORAL at 09:39

## 2022-01-01 RX ADMIN — LACTULOSE 30 G: 20 SOLUTION ORAL at 19:22

## 2022-01-01 RX ADMIN — HYDROCODONE BITARTRATE AND ACETAMINOPHEN 1 TABLET: 5; 325 TABLET ORAL at 21:18

## 2022-01-01 RX ADMIN — SODIUM CHLORIDE, PRESERVATIVE FREE 10 ML: 5 INJECTION INTRAVENOUS at 04:59

## 2022-01-01 RX ADMIN — ENOXAPARIN SODIUM 30 MG: 30 INJECTION SUBCUTANEOUS at 08:57

## 2022-01-01 RX ADMIN — TRAMADOL HYDROCHLORIDE 50 MG: 50 TABLET, COATED ORAL at 20:24

## 2022-01-01 RX ADMIN — GLYCOPYRROLATE 0.2 MG: 0.2 INJECTION INTRAMUSCULAR; INTRAVENOUS at 07:22

## 2022-02-04 PROBLEM — S72.009A HIP FRACTURE (HCC): Status: ACTIVE | Noted: 2022-01-01

## 2022-02-04 PROBLEM — S72.002A CLOSED FRACTURE OF NECK OF LEFT FEMUR: Status: ACTIVE | Noted: 2022-01-01

## 2022-02-04 NOTE — ED PROVIDER NOTES
Time: 5:19 PM EST  Arrived by: private car  Chief Complaint: FALL   History provided by: pt  History is limited by: N/A     History of Present Illness:  Patient is a 56 y.o. female that presents to the emergency department with FALL yesterday. Pt states that she lost her balance and fell. She reports severe pain to the left hip. She denies head injury and neck pain. No LOC. No modifying factors reported.     Pt c/o CP, but denies hitting chest during the fall. She also states that she has back pain, which is chronic.     Pt has hx of CVA (2 years ago). She denies being on a blood thinner.     History provided by:  Patient  Fall  Mechanism of injury: fall    Injury location:  Leg  Leg injury location:  L hip  Time since incident: occured yesterday.  Arrived directly from scene: no    Associated symptoms: back pain (chronic) and chest pain    Associated symptoms: no abdominal pain, no headaches, no nausea, no neck pain, no seizures and no vomiting        Similar Symptoms Previously: no  Recently seen: not recently seen in this ED       Patient Care Team  Primary Care Provider: Juil Cam APRN    Past Medical History:     Allergies   Allergen Reactions   • Ciprofloxacin Hives   • Sulfate Hives   • Cephalexin Rash   • Metronidazole Rash   • Sulfamethoxazole-Trimethoprim Rash     Past Medical History:   Diagnosis Date   • Allergic rhinitis due to allergen    • Arthritis    • Avascular necrosis (HCC) 09/29/2015   • Cervical cancer (HCC)    • Claustrophobia    • Diabetes mellitus, type 2 (HCC)    • Epilepsy (HCC)    • Essential hypertension    • Hepatitis C    • HIV positive (HCC)    • Migraines    • Neck abscess    • Neck mass    • Ovary cancer (HCC)    • Retained ureteral stent 08/10/2020   • Skin disease    • STD (female)    • Ureteral calculus, left 08/10/2020     Past Surgical History:   Procedure Laterality Date   • ANKLE ARTHROSCOPY W/ OPEN REPAIR  1989   • APPENDECTOMY  1998   • BACK SURGERY      X4   •  COLONOSCOPY  05/22/2013   • HYSTERECTOMY  1995   • LIVER BIOPSY  2014   • OTHER SURGICAL HISTORY      METAL PLATES; SPINE DDD     Family History   Problem Relation Age of Onset   • Ovarian cancer Mother    • Arthritis Mother    • Arthritis Sister    • Arthritis Brother    • Breast cancer Other    • Cervical cancer Other        Home Medications:  Prior to Admission medications    Medication Sig Start Date End Date Taking? Authorizing Provider   amitriptyline (ELAVIL) 10 MG tablet Take 10 mg by mouth every night at bedtime. 6/19/21   Maciel Milligan MD   Bictegravir-Emtricitab-Tenofov (BIKTARVY) -25 MG per tablet Take 1 tablet by mouth Daily.    Maciel Milligan MD   dapsone 100 MG tablet Take 1 tablet by mouth Daily.    Maciel Milligan MD   ergocalciferol (ERGOCALCIFEROL) 1.25 MG (56382 UT) capsule Take 1 capsule by mouth 1 (One) Time Per Week.    Maciel Milligan MD   promethazine (PHENERGAN) 25 MG tablet Take 1 tablet by mouth Every 6 (Six) Hours As Needed for Vomiting. 9/1/21   Juli Cam APRN   rizatriptan (MAXALT) 10 MG tablet Take 1 tablet by mouth Daily As Needed (migraine). 10/12/21   Juli Cam APRN        Social History:   Social History     Tobacco Use   • Smoking status: Never Smoker   • Smokeless tobacco: Never Used   Vaping Use   • Vaping Use: Never used   Substance Use Topics   • Alcohol use: Never   • Drug use: Never     Recent travel: no     Review of Systems:  Review of Systems   Constitutional: Negative for chills and fever.   HENT: Negative for congestion, rhinorrhea and sore throat.    Eyes: Negative for pain and visual disturbance.   Respiratory: Negative for apnea, cough, chest tightness and shortness of breath.    Cardiovascular: Positive for chest pain. Negative for palpitations.   Gastrointestinal: Negative for abdominal pain, diarrhea, nausea and vomiting.   Genitourinary: Negative for difficulty urinating and dysuria.   Musculoskeletal: Positive for  "back pain (chronic). Negative for joint swelling, myalgias and neck pain.        Left hip pain.    Skin: Negative for color change.   Neurological: Negative for seizures and headaches.   Psychiatric/Behavioral: Negative.    All other systems reviewed and are negative.       Physical Exam:  /66 (BP Location: Right arm, Patient Position: Lying)   Pulse 89   Temp 98.1 °F (36.7 °C) (Oral)   Resp 18   Ht 172.7 cm (68\")   Wt 71.8 kg (158 lb 4.6 oz)   LMP  (LMP Unknown)   SpO2 95%   BMI 24.07 kg/m²     Physical Exam  Vitals and nursing note reviewed.   Constitutional:       General: She is not in acute distress.     Appearance: Normal appearance. She is not toxic-appearing.   HENT:      Head: Normocephalic and atraumatic.      Jaw: There is normal jaw occlusion.   Eyes:      General: Lids are normal.      Extraocular Movements: Extraocular movements intact.      Conjunctiva/sclera: Conjunctivae normal.      Pupils: Pupils are equal, round, and reactive to light.   Cardiovascular:      Rate and Rhythm: Normal rate and regular rhythm.      Pulses: Normal pulses.      Heart sounds: Normal heart sounds.   Pulmonary:      Effort: Pulmonary effort is normal. No respiratory distress.      Breath sounds: Normal breath sounds. No wheezing or rhonchi.   Abdominal:      General: Abdomen is flat.      Palpations: Abdomen is soft.      Tenderness: There is no abdominal tenderness. There is no guarding or rebound.   Musculoskeletal:         General: Normal range of motion.      Cervical back: Normal range of motion and neck supple.      Right lower leg: No edema.      Left lower leg: No edema.      Comments: Lumbar midline tenderness. Left hip tenderness.    Skin:     General: Skin is warm and dry.   Neurological:      Mental Status: She is alert and oriented to person, place, and time. Mental status is at baseline.   Psychiatric:         Mood and Affect: Mood normal.                Medications in the Emergency " Department:  Medications   nitroglycerin (NITROSTAT) SL tablet 0.4 mg (has no administration in time range)   sodium chloride 0.9 % flush 10 mL (has no administration in time range)   sodium chloride 0.9 % flush 10 mL (has no administration in time range)   acetaminophen (TYLENOL) tablet 650 mg (has no administration in time range)     Or   acetaminophen (TYLENOL) 160 MG/5ML solution 650 mg (has no administration in time range)     Or   acetaminophen (TYLENOL) suppository 650 mg (has no administration in time range)   HYDROcodone-acetaminophen (NORCO) 5-325 MG per tablet 1 tablet (1 tablet Oral Given 2/4/22 2118)   ketorolac (TORADOL) injection 30 mg (30 mg Intravenous Given 2/4/22 2338)   HYDROmorphone (DILAUDID) injection 0.5 mg (0.5 mg Intravenous Given 2/4/22 2338)     And   naloxone (NARCAN) injection 0.4 mg (has no administration in time range)   melatonin tablet 5 mg (has no administration in time range)   sennosides-docusate (PERICOLACE) 8.6-50 MG per tablet 2 tablet (2 tablets Oral Given 2/4/22 2110)     And   polyethylene glycol (MIRALAX) packet 17 g (has no administration in time range)     And   bisacodyl (DULCOLAX) EC tablet 5 mg (has no administration in time range)     And   bisacodyl (DULCOLAX) suppository 10 mg (has no administration in time range)   ondansetron (ZOFRAN) tablet 4 mg (has no administration in time range)     Or   ondansetron (ZOFRAN) injection 4 mg (has no administration in time range)   aluminum-magnesium hydroxide-simethicone (MAALOX MAX) 400-400-40 MG/5ML suspension 15 mL (has no administration in time range)   amitriptyline (ELAVIL) tablet 10 mg (has no administration in time range)   Bictegravir-Emtricitab-Tenofov (BIKTARVY) -25 MG  ( Patient supplied)  (has no administration in time range)   hydrALAZINE (APRESOLINE) injection 10 mg (has no administration in time range)   dextrose (GLUTOSE) oral gel 15 g (has no administration in time range)   dextrose 10 % infusion (has  no administration in time range)   glucagon (human recombinant) (GLUCAGEN DIAGNOSTIC) injection 1 mg (has no administration in time range)   insulin lispro (humaLOG) injection 0-7 Units (has no administration in time range)   Pharmacy to dose vancomycin (has no administration in time range)   TRANEXAMIC ACID 2000 MG IN 50 ML SYRINGE (TOPICAL) SIMPLE syringe 2,000 mg 50 mL (has no administration in time range)   Ortho compound with morphine solution 30 mL (has no administration in time range)   vancomycin 1000 mg/250 mL 0.9% NS IVPB ([facility]) (has no administration in time range)   dapsone tablet 100 mg (has no administration in time range)   HYDROmorphone (DILAUDID) injection 1 mg (1 mg Intravenous Given 2/4/22 1736)   ketorolac (TORADOL) injection 30 mg (30 mg Intravenous Given 2/4/22 1736)   HYDROmorphone (DILAUDID) injection 1 mg (1 mg Intravenous Given 2/4/22 1946)   potassium chloride (MICRO-K) CR capsule 40 mEq (40 mEq Oral Given 2/4/22 2109)        Labs  Lab Results (last 24 hours)     Procedure Component Value Units Date/Time    CBC & Differential [[id number]]  (Abnormal) Collected: 02/04/22 1858    Specimen: Blood Updated: 02/04/22 1907    Narrative:      The following orders were created for panel order CBC & Differential.  Procedure                               Abnormality         Status                     ---------                               -----------         ------                     CBC Auto Differential[[id number]]        Abnormal            Final result                 Please view results for these tests on the individual orders.    Comprehensive Metabolic Panel [[id number]]  (Abnormal) Collected: 02/04/22 1858    Specimen: Blood Updated: 02/04/22 1924     Glucose 109 mg/dL      BUN 12 mg/dL      Creatinine 0.58 mg/dL      Sodium 139 mmol/L      Potassium 3.3 mmol/L      Chloride 105 mmol/L      CO2 22.8 mmol/L      Calcium 8.0 mg/dL      Total Protein 7.1 g/dL      Albumin 2.40 g/dL      ALT (SGPT)  11 U/L      AST (SGOT) 29 U/L      Alkaline Phosphatase 87 U/L      Total Bilirubin 1.6 mg/dL      eGFR Non African Amer 108 mL/min/1.73      Globulin 4.7 gm/dL      A/G Ratio 0.5 g/dL      BUN/Creatinine Ratio 20.7     Anion Gap 11.2 mmol/L     Narrative:      GFR Normal >60  Chronic Kidney Disease <60  Kidney Failure <15      Protime-INR [[id number]]  (Abnormal) Collected: 02/04/22 1858    Specimen: Blood Updated: 02/04/22 1912     Protime 12.6 Seconds      INR 1.24    Narrative:      Suggested Therapeutic Ranges For Oral Anticoagulant Therapy:  Level of Therapy                      INR Target Range  Standard Dose                            2.0-3.0  High Dose                                2.5-3.5  Patients not receiving anticoagulant  Therapy Normal Range                     0.6-1.2    CBC Auto Differential [[id number]]  (Abnormal) Collected: 02/04/22 1858    Specimen: Blood Updated: 02/04/22 1907     WBC 5.30 10*3/mm3      RBC 3.16 10*6/mm3      Hemoglobin 10.3 g/dL      Hematocrit 30.0 %      MCV 94.9 fL      MCH 32.6 pg      MCHC 34.3 g/dL      RDW 15.8 %      RDW-SD 55.3 fl      MPV 10.5 fL      Platelets 183 10*3/mm3      Neutrophil % 72.2 %      Lymphocyte % 12.6 %      Monocyte % 14.2 %      Eosinophil % 0.0 %      Basophil % 0.2 %      Immature Grans % 0.8 %      Neutrophils, Absolute 3.83 10*3/mm3      Lymphocytes, Absolute 0.67 10*3/mm3      Monocytes, Absolute 0.75 10*3/mm3      Eosinophils, Absolute 0.00 10*3/mm3      Basophils, Absolute 0.01 10*3/mm3      Immature Grans, Absolute 0.04 10*3/mm3      nRBC 0.0 /100 WBC     Magnesium [[id number]]  (Normal) Collected: 02/04/22 1858    Specimen: Blood Updated: 02/04/22 2120     Magnesium 1.6 mg/dL     Phosphorus [[id number]]  (Normal) Collected: 02/04/22 1858    Specimen: Blood Updated: 02/04/22 2120     Phosphorus 2.6 mg/dL     Iron Profile [[id number]]  (Abnormal) Collected: 02/04/22 1858    Specimen: Blood Updated: 02/04/22 2109     Iron 48 mcg/dL      Iron  Saturation 46 %      Transferrin 70 mg/dL      TIBC 104 mcg/dL            Imaging:  CT Lumbar Spine Without Contrast    Result Date: 2/4/2022  PROCEDURE: CT LUMBAR SPINE WO CONTRAST  COMPARISON: [facility], CT, LUMBAR SPINE W/ CONTRAST, 9/25/2019, 9:38.  INDICATIONS: pain  PROTOCOL:   Standard imaging protocol performed    RADIATION:   DLP: 827.6 mGy*cm   Automated exposure control was utilized to minimize radiation dose.  TECHNIQUE: After obtaining the patient's consent, multi-planar CT images were created without intravenous contrast material.   FINDINGS:  There is no evidence of an acute fracture.  Alignment is normal.  There are degenerative endplate changes throughout the lumbar spine.  There is posterior facet hypertrophic change at L3-4, L4-5 and greatest at L5-S1.  There is mild diffuse broad-based disc bulging at L3-4 L4-5 and L5-S1.  There is no focal free disc fragment.  Paraspinal structures are normal.       Multilevel degenerative disc disease change with no acute abnormality.     CECY MENDEZ MD       Electronically Signed and Approved By: CECY MENDEZ MD on 2/04/2022 at 18:15             CT Pelvis Without Contrast    Result Date: 2/4/2022  PROCEDURE: CT PELVIS WO CONTRAST  COMPARISON: [facility], CT, ABDOMEN/PELVIS WITH CONTRAST, 7/16/2014, 9:53.  INDICATIONS: evaluate fracture  PROTOCOL:   Standard imaging protocol performed    RADIATION:   DLP: 958.9 mGy*cm   Automated exposure control was utilized to minimize radiation dose.  TECHNIQUE: After obtaining the patient's consent, CT images were created without intravenous contrast.  Multiplanar imaging was performed.  FINDINGS:  There is a fracture of the subcapital left femur with displacement of the distal fragment laterally and foreshortening is also noted.  The acetabulum is intact.  There are degenerative changes of both hip joints and both SI joints.  Lower lumbar spine degenerative changes seen.        Displaced subcapital femur fracture on the left.     CECY MENDEZ MD       Electronically Signed and Approved By: CECY MENDEZ MD on 2/04/2022 at 18:19             XR Hip With or Without Pelvis 2 - 3 View Left    Result Date: 2/4/2022  PROCEDURE: XR HIP W OR WO PELVIS 2-3 VIEW LEFT  COMPARISON: None  INDICATIONS: FALL. LEFT HIP FRACTURE. REQUESTED BY DR TREJO  FINDINGS:  There is a fracture of the subcapital left femur.  No other fractures are seen.  There is bilateral hip and SI joint arthritis.       Subcapital left femur fracture      CECY MENDEZ MD       Electronically Signed and Approved By: CECY MENDEZ MD on 2/04/2022 at 19:23               Procedures:  Procedures    Progress                            Medical Decision Making:  MDM  Number of Diagnoses or Management Options  Closed fracture of left hip, initial encounter (HCC)  Diagnosis management comments: X-rays consistent with a hip fracture.  The patient´s CBC was reviewed and shows no abnormalities of critical concern. Of note, there is no anemia requiring a blood transfusion and the platelet count is acceptable.  The patient´s CMP was reviewed and shows no abnormalities of critical concern. Of note, the patient´s sodium and potassium are acceptable. The patient´s liver enzymes are unremarkable. The patient´s renal function (creatinine) is preserved. The patient has a normal anion gap.  Case was discussed with Dr. Trejo who agrees with admission.  Lumbar spine CT is negative for fracture.  Patient admitted under the care of Dr. Sandoval.       Amount and/or Complexity of Data Reviewed  Clinical lab tests: reviewed  Tests in the radiology section of CPT®: reviewed  Discussion of test results with the performing providers: yes  Discuss the patient with other providers: yes  Independent visualization of images, tracings, or specimens: yes    Risk of Complications, Morbidity, and/or Mortality  Presenting problems: moderate  Management options:  moderate    Patient Progress  Patient progress: stable       Final diagnoses:   Closed fracture of left hip, initial encounter ([facility])        Disposition:  ED Disposition     ED Disposition Condition Comment    Decision to Admit  Level of Care: Med/Surg [1]   Diagnosis: Hip fracture ([facility]) [197979]   Admitting Physician: LUIS ALBERTO MEADE [[id number]]   Attending Physician: LUIS ALBERTO MEADE [[id number]]   Isolate for COVID?: No [0]   Certification: I Certify That Inpatient Hospital Services Are Medically Necessary For Greater Than 2 Midnights            Documentation assistance provided by Estelita Martin acting as scribe for Edna Lee MD. Information recorded by the scribe was done at my direction and has been verified and validated by me.        Estelita Martin  02/04/22 1724       Edna Lee MD  02/05/22 0016

## 2022-02-05 NOTE — OP NOTE
TOTAL HIP ARTHROPLASTY ANTERIOR  Procedure Report    Patient Name:  Janneth Montes  YOB: 1965    Date of Surgery:  2/5/2022     Indications: Markedly displaced femoral neck fracture    Pre-op Diagnosis:   Closed fracture of neck of left femur, initial encounter ([facility]) [S72.002A]       Post-Op Diagnosis Codes:     * Closed fracture of neck of left femur, initial encounter ([facility]) [S72.002A]    Procedure/CPT® Codes:      Procedure(s):  Left anterior TOTAL HIP ARTHROPLASTY    Staff:  Surgeon(s):  Jarad Trejo MD    Assistant: Gayathri Knight    Anesthesia: Choice    Estimated Blood Loss: 100 mL    Implants:    Implant Name Type Inv. Item Serial No.  Lot No. LRB No. Used Action   SCRW ACET RONIT TRILOGY S/TAP 6.5X25 - [id number] Implant SCRW ACET RONIT TRILOGY S/TAP 6.5X25  [employer] [id number] Left 1 Implanted   SHLL ACET G7 PPS SZC 48MM - [id number] Implant SHLL ACET G7 PPS SZC 48MM  [employer] [id number] Left 1 Implanted   LINER ACET G7 NTRL E1 SZC 32MM - [id number] Implant LINER ACET G7 NTRL E1 SZC 32MM  [employer] [id number] Left 1 Implanted   HD FEM/HIP G7 BIOLOX/DELTA OPTN 32MM - [id number] Implant HD FEM/HIP G7 BIOLOX/DELTA OPTN 32MM  [employer] [id number] Left 1 Implanted   STEM FEM/HIP MICROPLASTY TAPERLOC COMPL STD SZ9 - [id number] Implant STEM FEM/HIP MICROPLASTY TAPERLOC COMPL STD SZ9  [employer] [id number] Left 1 Implanted   ADAPT HIP BIOLOX OPTN TYPE1 TPR MIN 6 - [id number] Implant ADAPT HIP BIOLOX OPTN TYPE1 TPR MIN 6  [employer] [id number] Left 1 Implanted       Specimen:          None        Findings: Femoral neck fracture    Complications:  None    Description of Procedure: The patient went to the operating room and  underwent anesthesia, received a preoperative antibiotic, was placed on the [place] table and was then prepped and draped in standard fashion. A standard anterior hip incision was made. The interval was found and retractors were placed. The capsule was then  opened. The femoral neck was identified. Retractors were placed around the neck. The femoral neck cut was then made and then the head was removed from the acetabulum. Acetabular retractors were then placed. The labrum was removed. C-arm fluoroscopy was used to help guide the reaming for the acetabulum. This was sequentially reamed and then the appropriate size shell was then inserted, followed by a screw and then the  liner. The bed was raised, the leg was dropped, and femoral exposure was obtained. This was then opened using a rat-tail reamer and then sequentially broached  and then a stem was inserted. Leg lengths were measured after reduction and a ceramic head was then chosen. This was then thoroughly irrigated, tranexamic acid and local were injected, and then closed using #1 Vicryl, 2-0 Vicryl and staples. Sterile dressing was applied. The patient was then taken to recovery in stable condition. There were no complications.    Assistant: Gayathri Knight  was responsible for performing the following activities: Retraction, Suction, Irrigation, Closing and Placing Dressing and their skilled assistance was necessary for the success of this case.    Jarad Trejo MD     Date: 2/5/2022  Time: 08:53 EST

## 2022-02-05 NOTE — PLAN OF CARE
Goal Outcome Evaluation:              Outcome Summary: PT HAS BEEN STABLE SINCE ARRIVAL ONTO UNIT. NO CONCERNS NOTED. PT  TO BRING HIV MEDICATION IN THE AM WHEN HE COMES TO VISIT.

## 2022-02-05 NOTE — PROGRESS NOTES
[facility]   Hospitalist Progress Note  Date: 2/5/2022  Patient Name: Janneth Montes    Subjective   Subjective     Chief Complaint:   Fall    Summary:   Janneth Montes is a 56 y.o. female  with HIV, cirrhosis fell yesterday after losing her balance.  Sustained left femur subcapital fracture with displacement.  Patient underwent operative repair with left total hip arthroplasty on February fifth.    Interval Followup: Patient is immediately postoperative at the time of my exam and feeling quite well.  Pain well controlled.  I discussed the possibility of rehab she expresses that she would rather return home but will need home PT OT as she does not have a car or transportation available for outpatient therapy.    Review of Systems   No nausea or vomiting no current pain no chest pain    Objective   Objective     Vitals:   Temp:  [95.1 °F (35.1 °C)-98.1 °F (36.7 °C)] 97.2 °F (36.2 °C)  Heart Rate:  [66-94] 66  Resp:  [9-18] 10  BP: ()/(40-66) 95/55  Flow (L/min):  [6] 6  Physical Exam   Gen: NAD, WDWN female sitting up in the recliner  HEENT: NCAT, mmm  Resp: CTAB no wrr, no dyspnea  CV: RRR no mrg, no LE edema  GI: Abdomen soft NT, ND +bs  Psych: AOx3, normal mood and affect  MSK: Left hip bandages and ice in place      Result Review    Result Review:  I have personally reviewed the results from the time of this admission to 2/5/2022 17:08 EST and agree with these findings:  [x]  Laboratory  Glucose 319  Hemoglobin 10.4  [x]  Microbiology perioperative Covid pending  [x]  Radiology hip x-ray: Status post LORI  []  EKG/Telemetry   []  Cardiology/Vascular   []  Pathology  []  Old records  []  Other:    Assessment/Plan   Assessment / Plan     Assessment/Plan:  • Subcapital left femur fracture with displacement  • HIV  • Cirrhosis  • Diabetes  • Hypertension  • Anemia  • Hypokalemia      Plan:   • Continue HIV meds, Biktavry and dapsone  • Oral and IV narcotics for pain control, Toradol as  well  • Discontinue IV fluids  • PT OT, desires home health  • Sliding-scale insulin for now, A1c ordered, normally does not take oral medications for diabetes at home      DVT ppx: Lovenox  Diet: Carb consistent  Discussed with bedside RN

## 2022-02-05 NOTE — H&P (VIEW-ONLY)
[facility]   Consult Note    Patient Name: Janneth Montes  : 1965  MRN: [id number]  Primary Care Physician:  Juli Cam APRN  Referring Physician: RANCHO Snyder  Date of admission: 2022    Consults  Subjective   Subjective     Reason for Consult/ Chief Complaint: Left hip fracture    History of Present Illness  Janneth Montes is a 56 y.o. female status post fall yesterday when she lost her balance.  Pain increased to be severe.  Reports the emergency room her x-rays felt a percent displaced femoral neck fracture.    Review of Systems     Personal History     Past Medical History:   Diagnosis Date   • Allergic rhinitis due to allergen    • Arthritis    • Avascular necrosis (HCC) 2015   • Cervical cancer (HCC)    • Claustrophobia    • Diabetes mellitus, type 2 (HCC)    • Epilepsy (HCC)    • Essential hypertension    • Hepatitis C    • HIV positive (HCC)    • Migraines    • Neck abscess    • Neck mass    • Ovary cancer (HCC)    • Retained ureteral stent 08/10/2020   • Skin disease    • STD (female)    • Ureteral calculus, left 08/10/2020       Past Surgical History:   Procedure Laterality Date   • ANKLE ARTHROSCOPY W/ OPEN REPAIR     • APPENDECTOMY     • BACK SURGERY      X4   • COLONOSCOPY  2013   • HYSTERECTOMY     • LIVER BIOPSY     • OTHER SURGICAL HISTORY      METAL PLATES; SPINE DDD       Family History: Her family history includes Arthritis in her brother, mother, and sister; Breast cancer in an other family member; Cervical cancer in an other family member; Ovarian cancer in her mother.     Social History: She  reports that she has never smoked. She has never used smokeless tobacco. She reports that she does not drink alcohol and does not use drugs.    Home Medications:  Bictegravir-Emtricitab-Tenofov, amitriptyline, dapsone, and ergocalciferol    Allergies:  She is allergic to ciprofloxacin, sulfate, cephalexin, metronidazole, and  sulfamethoxazole-trimethoprim.    Objective    Objective     Vitals:    Temp:  [98.5 °F (36.9 °C)] 98.5 °F (36.9 °C)  Heart Rate:  [104] 104  Resp:  [18] 18  BP: (132)/(65) 132/65    Physical Exam  Pain in left hip, leg is shortened and rotated, able to move her toes and ankle.  Good cap refill  Result Review    Result Review:  I have personally reviewed the results from the time of this admission to 2/4/2022 21:34 EST and agree with these findings:  [x]  Laboratory  []  Microbiology  [x]  Radiology  []  EKG/Telemetry   []  Cardiology/Vascular   []  Pathology  []  Old records  []  Other:  Most notable findings include: Displaced left femoral neck fracture    Assessment/Plan   Assessment / Plan     Brief Patient Summary:  Janneth Montes is a 56 y.o. female status post fall with left hip fracture    Active Hospital Problems:  Active Hospital Problems    Diagnosis    • **Closed fracture of neck of left femur (HCC)      Added automatically from request for surgery [id number]     • Hip fracture (HCC)        Plan:   Risk and benefits of left total hip replacement, possible hemiarthroplasty for her displaced femoral neck fracture were explained.  Patient wishes to proceed.    Jarad Trejo MD

## 2022-02-05 NOTE — CONSULTS
[facility]   Consult Note    Patient Name: Janneth Montes  : 1965  MRN: [id number]  Primary Care Physician:  Juli Cam APRN  Referring Physician: RANCHO Snyder  Date of admission: 2022    Consults  Subjective   Subjective     Reason for Consult/ Chief Complaint: Left hip fracture    History of Present Illness  Janneth Montes is a 56 y.o. female status post fall yesterday when she lost her balance.  Pain increased to be severe.  Reports the emergency room her x-rays felt a percent displaced femoral neck fracture.    Review of Systems     Personal History     Past Medical History:   Diagnosis Date   • Allergic rhinitis due to allergen    • Arthritis    • Avascular necrosis (HCC) 2015   • Cervical cancer (HCC)    • Claustrophobia    • Diabetes mellitus, type 2 (HCC)    • Epilepsy (HCC)    • Essential hypertension    • Hepatitis C    • HIV positive (HCC)    • Migraines    • Neck abscess    • Neck mass    • Ovary cancer (HCC)    • Retained ureteral stent 08/10/2020   • Skin disease    • STD (female)    • Ureteral calculus, left 08/10/2020       Past Surgical History:   Procedure Laterality Date   • ANKLE ARTHROSCOPY W/ OPEN REPAIR     • APPENDECTOMY     • BACK SURGERY      X4   • COLONOSCOPY  2013   • HYSTERECTOMY     • LIVER BIOPSY     • OTHER SURGICAL HISTORY      METAL PLATES; SPINE DDD       Family History: Her family history includes Arthritis in her brother, mother, and sister; Breast cancer in an other family member; Cervical cancer in an other family member; Ovarian cancer in her mother.     Social History: She  reports that she has never smoked. She has never used smokeless tobacco. She reports that she does not drink alcohol and does not use drugs.    Home Medications:  Bictegravir-Emtricitab-Tenofov, amitriptyline, dapsone, and ergocalciferol    Allergies:  She is allergic to ciprofloxacin, sulfate, cephalexin, metronidazole, and  sulfamethoxazole-trimethoprim.    Objective    Objective     Vitals:    Temp:  [98.5 °F (36.9 °C)] 98.5 °F (36.9 °C)  Heart Rate:  [104] 104  Resp:  [18] 18  BP: (132)/(65) 132/65    Physical Exam  Pain in left hip, leg is shortened and rotated, able to move her toes and ankle.  Good cap refill  Result Review    Result Review:  I have personally reviewed the results from the time of this admission to 2/4/2022 21:34 EST and agree with these findings:  [x]  Laboratory  []  Microbiology  [x]  Radiology  []  EKG/Telemetry   []  Cardiology/Vascular   []  Pathology  []  Old records  []  Other:  Most notable findings include: Displaced left femoral neck fracture    Assessment/Plan   Assessment / Plan     Brief Patient Summary:  Janneth Montes is a 56 y.o. female status post fall with left hip fracture    Active Hospital Problems:  Active Hospital Problems    Diagnosis    • **Closed fracture of neck of left femur (HCC)      Added automatically from request for surgery [id number]     • Hip fracture (HCC)        Plan:   Risk and benefits of left total hip replacement, possible hemiarthroplasty for her displaced femoral neck fracture were explained.  Patient wishes to proceed.    Jarad Trejo MD

## 2022-02-05 NOTE — ANESTHESIA POSTPROCEDURE EVALUATION
Patient: Janneth Montes    Procedure Summary     Date: 02/05/22 Room / Location: [facility] OR 05 / [facility] MAIN OR    Anesthesia Start: 0715 Anesthesia Stop: 0857    Procedure: TOTAL HIP ARTHROPLASTY (Left Hip) Diagnosis:       Closed fracture of neck of left femur, initial encounter ([facility])      (Closed fracture of neck of left femur, initial encounter ([facility]) [S72.002A])    Surgeons: Jarad Trejo MD Provider: Elysia Beavers DO    Anesthesia Type: general ASA Status: 3          Anesthesia Type: general    Vitals  Vitals Value Taken Time   BP 96/48 02/05/22 0925   Temp 35.9 °C (96.6 °F) 02/05/22 0855   Pulse 87 02/05/22 0927   Resp 10 02/05/22 0925   SpO2 96 % 02/05/22 0927   Vitals shown include unvalidated device data.        Post Anesthesia Care and Evaluation    Patient location during evaluation: bedside  Patient participation: complete - patient participated  Level of consciousness: awake  Pain management: adequate  Airway patency: patent  Anesthetic complications: No anesthetic complications  PONV Status: none  Cardiovascular status: acceptable and stable  Respiratory status: acceptable  Hydration status: acceptable    Comments: An Anesthesiologist personally participated in the most demanding procedures (including induction and emergence if applicable) in the anesthesia plan, monitored the course of anesthesia administration at frequent intervals and remained physically present and available for immediate diagnosis and treatment of emergencies.

## 2022-02-05 NOTE — ANESTHESIA PREPROCEDURE EVALUATION
Anesthesia Evaluation     Patient summary reviewed and Nursing notes reviewed   no history of anesthetic complications:  NPO Solid Status: > 8 hours  NPO Liquid Status: > 2 hours           Airway   Mallampati: II  TM distance: >3 FB  Neck ROM: limited  Possible difficult intubation  Dental    (+) edentulous    Pulmonary - negative pulmonary ROS and normal exam   Cardiovascular - normal exam  Exercise tolerance: unable to assess    ECG reviewed    (+) hypertension, dysrhythmias Tachycardia,       Neuro/Psych  (+) seizures (patient denies), CVA (unsteady gait) residual symptoms, headaches, numbness, psychiatric history,     GI/Hepatic/Renal/Endo    (+)   hepatitis C, liver disease, renal disease stones, diabetes mellitus,     Musculoskeletal     (+) back pain, chronic pain,   Abdominal  - normal exam   Substance History   (+) alcohol use (last drink 6 months ago),      OB/GYN negative ob/gyn ROS         Other   arthritis,    history of cancer (ovarian, cervical)    ROS/Med Hx Other: HIV +    Patient reports falling after loosing her balance. Denies LOC.                  Anesthesia Plan    ASA 3     general   (Patient understands anesthesia not responsible for dental damage. Patient not a candidate for spinal anesthetic due to multiple back surgeries with hardware in place.)  intravenous induction     Anesthetic plan, all risks, benefits, and alternatives have been provided, discussed and informed consent has been obtained with: patient.    Plan discussed with CRNA.        CODE STATUS:    Level Of Support Discussed With: Patient  Code Status (Patient has no pulse and is not breathing): CPR (Attempt to Resuscitate)  Medical Interventions (Patient has pulse or is breathing): Full Support

## 2022-02-05 NOTE — H&P
[facility]  [facility]: 20[facility]   Patient Name: Janneth Montes  : 1965  MRN: [id number]  Primary Care Physician:  Juli Cam APRN  Date of admission: 2022    Subjective Fall  Subjective     Chief Complaint: Fall    HPI:  Janneth Montes is a 56 y.o. female who fell yesterday after losing her balance.  She reports severe pain in the left hip.  Hip x-ray shows:Subcapital left femur fracture.  CT pelvis without contrast shows:There is a fracture of the subcapital left femur with displacement of the distal fragment laterally and foreshortening is also noted.  The acetabulum is intact.  There are degenerative changes of both hip joints and both SI joints. Lower lumbar spine degenerative changes seen.     Patient's past medical history significant for liver cirrhosis, and HIV, prior CVA, DM 2,DDD.    On arrival to the ED, patient's temperature is 98.5, pulse 104, respiratory rate 18, blood pressure 132/65, she saturating 100% on room air.    On labs, she does not have leukocytosis, she does have a hemoglobin of 10.3, lymphocytes of 12.6.  Her INR is 1.24.  Her albumin is 2.40.  Potassium 3.3.  Glucose 109.    Personal History     Past Medical History:  Past Medical History:   Diagnosis Date   • Allergic rhinitis due to allergen    • Arthritis    • Avascular necrosis (HCC) 2015   • Cervical cancer (HCC)    • Claustrophobia    • Diabetes mellitus, type 2 (HCC)    • Epilepsy (HCC)    • Essential hypertension    • Hepatitis C    • HIV positive (HCC)    • Migraines    • Neck abscess    • Neck mass    • Ovary cancer (HCC)    • Retained ureteral stent 08/10/2020   • Skin disease    • STD (female)    • Ureteral calculus, left 08/10/2020         Past Surgical History:  Past Surgical History:   Procedure Laterality Date   • ANKLE ARTHROSCOPY W/ OPEN REPAIR     • APPENDECTOMY     • BACK SURGERY      X4   • COLONOSCOPY  2013   • HYSTERECTOMY     •  LIVER BIOPSY  2014   • OTHER SURGICAL HISTORY      METAL PLATES; SPINE DDD       Family History:   Family History   Problem Relation Age of Onset   • Ovarian cancer Mother    • Arthritis Mother    • Arthritis Sister    • Arthritis Brother    • Breast cancer Other    • Cervical cancer Other        Social History:   Social History     Tobacco Use   • Smoking status: Never Smoker   • Smokeless tobacco: Never Used   Vaping Use   • Vaping Use: Never used   Substance Use Topics   • Alcohol use: Never   • Drug use: Never       Home Medications:  Bictegravir-Emtricitab-Tenofov, amitriptyline, dapsone, and ergocalciferol    Allergies:  Allergies   Allergen Reactions   • Ciprofloxacin Hives   • Sulfate Hives   • Cephalexin Rash   • Metronidazole Rash   • Sulfamethoxazole-Trimethoprim Rash       Review of Systems   All systems were reviewed and negative except for: Hip pain    Objective   Objective     Vitals:   Temp:  [98.5 °F (36.9 °C)] 98.5 °F (36.9 °C)  Heart Rate:  [104] 104  Resp:  [18] 18  BP: (132)/(65) 132/65    Physical Exam    Constitutional: Awake, alert, no acute distress   Eyes: Pupils equal, sclerae anicteric, no conjunctival injection   HENT: NCAT, mucous membranes moist   Neck: Supple, no thyromegaly, no lymphadenopathy, trachea midline   Respiratory: Clear to auscultation bilaterally, nonlabored respirations    Cardiovascular: RRR, no murmurs, rubs, or gallops, palpable pedal pulses bilaterally   Gastrointestinal: Positive bowel sounds, soft, nontender, nondistended   Musculoskeletal: No bilateral ankle edema, no clubbing or cyanosis to extremities   Psychiatric: Appropriate affect, cooperative   Neurologic: Oriented x 3, strength symmetric in all extremities, Cranial Nerves grossly intact to confrontation, speech clear   Skin: No rashes     Result Review    Result Review:  I have personally reviewed the results from the time of this admission to 2/4/2022 20:26 EST and agree with these findings:  [x]   Laboratory  []  Microbiology  [x]  Radiology  []  EKG/Telemetry   []  Cardiology/Vascular   []  Pathology  [x]  Old records  []  Other:      Assessment/Plan   Assessment / Plan   Assessment/Plan:   #1 Subcapital left femur with displacement of the distal fragment laterally and foreshortening   -Ortho consulted  -Pain management  -Lovenox AF dose in the morning.  Can be held if he is going to surgery.   -Incentive spirometry  -PT/OT/case management for possible placement.    #2 HIV  -Continue home Biktarvy.  -continue dapsone.    #3 cirrhosis  -not on any medications for cirrhosis.      #4 diabetes mellitus-not on any oral hypoglycemics.  Will cover with insulin sliding scale if needed.    #5 essential hypertension-not on any anti-hypertensives.  We will put as needed hydralazine if needed.    #6 anemia-will check iron panel    #7 hypokalemia-replace.     DVT prophylaxis:  No DVT prophylaxis order currently exists.    CODE STATUS:         Admission Status:  I believe this patient meets inpatient status.    Electronically signed by Vasyl Sandoval DO, 02/04/22, 8:26 PM EST.

## 2022-02-06 NOTE — PLAN OF CARE
Goal Outcome Evaluation:  Plan of Care Reviewed With: patient        Progress: improving  Outcome Summary: Patient presents with balance and endurance limitations that impede his/her ability to perform ADLS. The skills of a therapist are necessary to maximize independence with ADLs.

## 2022-02-06 NOTE — PLAN OF CARE
Goal Outcome Evaluation:  Plan of Care Reviewed With: patient        Progress: no change  Outcome Summary: pt. medicated x 2 for pain with relief noted.  pt. is a one person to standby assist with walker to ambulate.

## 2022-02-06 NOTE — PLAN OF CARE
Goal Outcome Evaluation:              Outcome Summary: pt has been stable throughout the day today. pt has been medicated for pain, see meds. no complaints or concerns noted.
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
## 2022-02-06 NOTE — THERAPY EVALUATION
Patient Name: Janneth Montes  : 1965    MRN: [id number]                              Today's Date: 2022       Admit Date: 2022    Visit Dx:     ICD-10-CM ICD-9-CM   1. Closed fracture of left hip, initial encounter (HCC)  S72.002A 820.8   2. Decreased activities of daily living (ADL)  Z78.9 V49.89     Patient Active Problem List   Diagnosis   • Allergic rhinitis due to allergen   • Arthritis   • Diabetes mellitus, type II (HCC)   • Encounter for long-term (current) use of insulin (HCC)   • Epilepsy (HCC)   • Essential hypertension   • Hepatitis C   • HIV positive (HCC)   • Migraines   • Peripheral neuropathic pain   • Skin disease   • Closed fracture of neck of left femur (HCC)   • Hip fracture (HCC)     Past Medical History:   Diagnosis Date   • Allergic rhinitis due to allergen    • Arthritis    • Avascular necrosis (HCC) 2015   • Cervical cancer (HCC)    • Claustrophobia    • Diabetes mellitus, type 2 (HCC)    • Epilepsy (HCC)    • Essential hypertension    • Hepatitis C    • HIV positive (HCC)    • Migraines    • Neck abscess    • Neck mass    • Ovary cancer (HCC)    • Retained ureteral stent 08/10/2020   • Skin disease    • STD (female)    • Ureteral calculus, left 08/10/2020     Past Surgical History:   Procedure Laterality Date   • ANKLE ARTHROSCOPY W/ OPEN REPAIR     • APPENDECTOMY     • BACK SURGERY      X4   • COLONOSCOPY  2013   • HYSTERECTOMY     • LIVER BIOPSY     • OTHER SURGICAL HISTORY      METAL PLATES; SPINE DDD      General Information     Row Name 22 0836          OT Time and Intention    Document Type evaluation  -AC     Mode of Treatment individual therapy; occupational therapy  -AC     Row Name 22 0836          General Information    Patient Profile Reviewed yes  -AC     Prior Level of Function independent:  pt. reports (I) wtih adls and did not use AD for fx'l mobility. she states she has a tub shower for bathing and has a regular  commode in place.  -     Existing Precautions/Restrictions fall; weight bearing  -     Barriers to Rehab none identified  -     Row Name 02/06/22 0836          Occupational Profile    Reason for Services/Referral (Occupational Profile) Pt. is a 56 year old female admitted for the above diagnosis status post left total hip replacement on 2/5/2022. Pt. referred to OT services to assess independence with ADLs and adl transfers/fx'l mobility. No previous OT services for current condition.  -     Row Name 02/06/22 0836          Living Environment    Lives With spouse  -     Row Name 02/06/22 08          Home Main Entrance    Number of Stairs, Main Entrance none  -     Row Name 02/06/22 08          Cognition    Orientation Status (Cognition) oriented x 4  -     Row Name 02/06/22 0836          Safety Issues, Functional Mobility    Safety Issues Affecting Function (Mobility) --  none identified  -     Impairments Affecting Function (Mobility) balance; endurance/activity tolerance; pain  -           User Key  (r) = Recorded By, (t) = Taken By, (c) = Cosigned By    Initials Name Provider Type     Kya López, OT Occupational Therapist                 Mobility/ADL's     Row Name 02/06/22 0838          Bed Mobility    Bed Mobility bed mobility (all) activities  -     All Activities, [place] (Bed Mobility) standby assist  -     Assistive Device (Bed Mobility) bed rails  -     Row Name 02/06/22 0838          Transfers    Transfers sit-stand transfer  -     Sit-Stand [place] (Transfers) standby assist; verbal cues  -     Row Name 02/06/22 0838          Sit-Stand Transfer    Assistive Device (Sit-Stand Transfers) walker, front-wheeled  -     Row Name 02/06/22 0838          Functional Mobility    Functional Mobility- Ind. Level standby assist; verbal cues required  -     Functional Mobility- Device rolling walker  -     Functional Mobility- Comment pt. was SBA with rolling walker  for EOB to/from bathroom for toileting with verbal cues for safety.  -     Row Name 02/06/22 0838          Activities of Daily Living    BADL Assessment/Intervention --  patient was setup for upper body bathing/dressing/grooming while seated. Patient is min/SBA for lower body bathing/dressing and SBA for toileting with use of 3-in1 commode.  -     Row Name 02/06/22 0838          Mobility    Extremity Weight-bearing Status left lower extremity  -     Left Lower Extremity (Weight-bearing Status) weight-bearing as tolerated (WBAT)  -           User Key  (r) = Recorded By, (t) = Taken By, (c) = Cosigned By    Initials Name Provider Type    Kya Wood OT Occupational Therapist               Obj/Interventions     Row Name 02/06/22 0843          Sensory Assessment (Somatosensory)    Sensory Assessment (Somatosensory) sensation intact  -     Row Name 02/06/22 0843          Vision Assessment/Intervention    Visual Impairment/Limitations WNL  -     Row Name 02/06/22 0843          Range of Motion Comprehensive    General Range of Motion bilateral upper extremity ROM WNL  -[facility] Name 02/06/22 0843          Strength Comprehensive (MMT)    General Manual Muscle Testing (MMT) Assessment no strength deficits identified  -     Row Name 02/06/22 0843          Motor Skills    Motor Skills coordination; functional endurance  -     Coordination WFL  -     Functional Endurance fair plus  -     Row Name 02/06/22 0843          Balance    Balance Assessment standing dynamic balance  -     Dynamic Standing Balance mild impairment  -     Balance Interventions sit to stand; supported; dynamic; minimal challenge; occupation based/functional task  -           User Key  (r) = Recorded By, (t) = Taken By, (c) = Cosigned By    Initials Name Provider Type    Kya Wood OT Occupational Therapist               Goals/Plan     Row Name 02/06/22 0845          Bed Mobility Goal 1 (OT)    Activity/Assistive  Device (Bed Mobility Goal 1, OT) bed mobility activities, all  -AC     [place] Level/Cues Needed (Bed Mobility Goal 1, OT) modified independence  -AC     Time Frame (Bed Mobility Goal 1, OT) long term goal (LTG); 10 days  -AC     Row Name 02/06/22 0845          Transfer Goal 1 (OT)    Activity/Assistive Device (Transfer Goal 1, OT) transfers, all  -AC     [place] Level/Cues Needed (Transfer Goal 1, OT) modified independence  -AC     Time Frame (Transfer Goal 1, OT) long term goal (LTG); 10 days  -AC     Row Name 02/06/22 0845          Bathing Goal 1 (OT)    Activity/Device (Bathing Goal 1, OT) bathing skills, all  -AC     [place] Level/Cues Needed (Bathing Goal 1, OT) modified independence  -AC     Time Frame (Bathing Goal 1, OT) long term goal (LTG); 10 days  -AC     Row Name 02/06/22 0845          Dressing Goal 1 (OT)    Activity/Device (Dressing Goal 1, OT) dressing skills, all  -AC     [place]/Cues Needed (Dressing Goal 1, OT) modified independence  -AC     Time Frame (Dressing Goal 1, OT) long term goal (LTG); 10 days  -AC     Row Name 02/06/22 0845          Toileting Goal 1 (OT)    Activity/Device (Toileting Goal 1, OT) toileting skills, all  -AC     [place] Level/Cues Needed (Toileting Goal 1, OT) modified independence  -AC     Time Frame (Toileting Goal 1, OT) long term goal (LTG); 10 days  -AC     Row Name 02/06/22 0845          Grooming Goal 1 (OT)    Activity/Device (Grooming Goal 1, OT) grooming skills, all  -AC     [place] (Grooming Goal 1, OT) modified independence  -AC     Time Frame (Grooming Goal 1, OT) long term goal (LTG); 10 days  -AC     Row Name 02/06/22 0845          Therapy Assessment/Plan (OT)    Planned Therapy Interventions (OT) activity tolerance training; functional balance retraining; patient/caregiver education/training; transfer/mobility retraining; ROM/therapeutic exercise; BADL retraining; occupation/activity based interventions  -AC           User Key   (r) = Recorded By, (t) = Taken By, (c) = Cosigned By    Initials Name Provider Type     Kya López OT Occupational Therapist               Clinical Impression     Row Name 02/06/22 0843          Pain Assessment    Additional Documentation Pain Scale: FACES Pre/Post-Treatment (Group)  -[facility] Name 02/06/22 0843          Pain Scale: FACES Pre/Post-Treatment    Pain: FACES Scale, Pretreatment 0-->no hurt  -     Posttreatment Pain Rating 0-->no hurt  -AC     Row Name 02/06/22 0843          Plan of Care Review    Plan of Care Reviewed With patient  -     Progress improving  -     Outcome Summary Patient presents with balance and endurance limitations that impede his/her ability to perform ADLS. The skills of a therapist are necessary to maximize independence with ADLs.  -[facility] Name 02/06/22 0843          Therapy Assessment/Plan (OT)    Patient/Family Therapy Goal Statement (OT) Return home with assist.  -     Rehab Potential (OT) good, to achieve stated therapy goals  -     Criteria for Skilled Therapeutic Interventions Met (OT) yes; meets criteria; skilled treatment is necessary  -     Therapy Frequency (OT) 5 times/wk  -[facility] Name 02/06/22 0843          Therapy Plan Review/Discharge Plan (OT)    Equipment Needs Upon Discharge (OT) commode chair; walker, rolling; tub bench  -     Anticipated Discharge Disposition (OT) home with assist; home with home health  -[facility] Name 02/06/22 0843          Positioning and Restraints    Pre-Treatment Position in bed  -     Post Treatment Position bed  -AC     In Bed encouraged to call for assist; call light within reach; fowlers; exit alarm on  -           User Key  (r) = Recorded By, (t) = Taken By, (c) = Cosigned By    Initials Name Provider Type     Kya López OT Occupational Therapist               Outcome Measures     Row Name 02/06/22 0849          How much help from another is currently needed...    Putting on and taking off  regular lower body clothing? 3  -AC     Bathing (including washing, rinsing, and drying) 3  -AC     Toileting (which includes using toilet bed pan or urinal) 3  -AC     Putting on and taking off regular upper body clothing 4  -AC     Taking care of personal grooming (such as brushing teeth) 4  -AC     Eating meals 4  -AC     AM-PAC 6 Clicks Score (OT) 21  -AC     Row Name 02/06/22 0720          How much help from another person do you currently need...    Turning from your back to your side while in flat bed without using bedrails? 3  -NS     Moving from lying on back to sitting on the side of a flat bed without bedrails? 2  -NS     Moving to and from a bed to a chair (including a wheelchair)? 2  -NS     Standing up from a chair using your arms (e.g., wheelchair, bedside chair)? 2  -NS     Climbing 3-5 steps with a railing? 2  -NS     To walk in hospital room? 2  -NS     AM-PAC 6 Clicks Score (PT) 13  -NS     Row Name 02/06/22 0849          Functional Assessment    Outcome Measure Options AM-PAC 6 Clicks Daily Activity (OT); Optimal Instrument  -AC     Row Name 02/06/22 0849          Optimal Instrument    Optimal Instrument Optimal - 3  -AC     Bending/Stooping 2  -AC     Standing 2  -AC     Reaching 1  -AC     From the list, choose the 3 activities you would most like to be able to do without any difficulty Bending/stooping; Reaching; Standing  -AC     Total Score Optimal - 3 5  -AC           User Key  (r) = Recorded By, (t) = Taken By, (c) = Cosigned By    Initials Name Provider Type    AC Kya López OT Occupational Therapist    Ofelia Reagan, RN Registered Nurse                Occupational Therapy Education                 Title: PT OT SLP Therapies (Done)     Topic: Occupational Therapy (Done)     Point: ADL training (Done)     Description:   Instruct learner(s) on proper safety adaptation and remediation techniques during self care or transfers.   Instruct in proper use of assistive devices.               Learning Progress Summary           Patient Acceptance, E, VU by  at 2/6/2022 0855                   Point: Home exercise program (Done)     Description:   Instruct learner(s) on appropriate technique for monitoring, assisting and/or progressing therapeutic exercises/activities.              Learning Progress Summary           Patient Acceptance, E, VU by  at 2/6/2022 0855                   Point: Precautions (Done)     Description:   Instruct learner(s) on prescribed precautions during self-care and functional transfers.              Learning Progress Summary           Patient Acceptance, E, VU by  at 2/6/2022 0855                   Point: Body mechanics (Done)     Description:   Instruct learner(s) on proper positioning and spine alignment during self-care, functional mobility activities and/or exercises.              Learning Progress Summary           Patient Acceptance, E, VU by  at 2/6/2022 0855                               User Key     Initials Effective Dates Name Provider Type Discipline     06/16/21 -  Kya López OT Occupational Therapist OT              OT Recommendation and Plan  Planned Therapy Interventions (OT): activity tolerance training, functional balance retraining, patient/caregiver education/training, transfer/mobility retraining, ROM/therapeutic exercise, BADL retraining, occupation/activity based interventions  Therapy Frequency (OT): 5 times/wk  Plan of Care Review  Plan of Care Reviewed With: patient  Progress: improving  Outcome Summary: Patient presents with balance and endurance limitations that impede his/her ability to perform ADLS. The skills of a therapist are necessary to maximize independence with ADLs.     Time Calculation:    Time Calculation- OT     Row Name 02/06/22 0856             Time Calculation- OT    OT Received On 02/06/22  -      OT Goal Re-Cert Due Date 02/15/22  -              Untimed Charges    OT Eval/Re-eval Minutes 36  -              Total  Minutes    Untimed Charges Total Minutes 36  -AC       Total Minutes 36  -AC            User Key  (r) = Recorded By, (t) = Taken By, (c) = Cosigned By    Initials Name Provider Type    AC Kya López OT Occupational Therapist              Therapy Charges for Today     Code Description Service Date Service Provider Modifiers Qty    [id number]  OT EVAL LOW COMPLEXITY 3 2/6/2022 Kya López OT GO 1               Kya López OT  2/6/2022

## 2022-02-06 NOTE — PROGRESS NOTES
Orthopedic Total Hip Progress Note    Assessment/Plan anticipate home tomorrow with home health, PT/OT, DVT prophylaxis, follow-up in 2 weeks    Status post-left total hip arthroplasty: Doing well postoperatively.    Pain Relief: some relief    Continues current post-op course    Activity: up with assistance    Weight Bearing: WBAT     LOS: 0 days     Subjective     Post-Operative Day: 1 post-op total hip arthroplasty  Systemic or Specific Complaints: No Complaints    Objective     Vital signs in last 24 hours:  Vitals:    02/06/22 0332 02/06/22 0800 02/06/22 0813 02/06/22 1100   BP: 106/48  116/63 110/60   BP Location: Right arm  Right arm Right arm   Patient Position: Lying  Lying Lying   Pulse: 79  83 83   Resp: 16  18 18   Temp: 97.7 °F (36.5 °C)  97.8 °F (36.6 °C) 97.7 °F (36.5 °C)   TempSrc: Oral  Oral Oral   SpO2: 100% 98% 99% 99%   Weight:       Height:            General: alert, appears stated age, and cooperative   Neurovascular: Tibial nerve: Intact, Superficial peroneal nerve: Intact, and Deep peroneal nerve: Intact  Capillary refill: Normal   Wound: Wound clean and dry no evidence of infection.   Range of Motion: Limited flexsion and Limited extension   DVT Exam: No evidence of DVT seen on physical exam.      WBC   Date Value Ref Range Status   02/06/2022 7.07 3.40 - 10.80 10*3/mm3 Final     RBC   Date Value Ref Range Status   02/06/2022 2.79 (L) 3.77 - 5.28 10*6/mm3 Final     Hemoglobin   Date Value Ref Range Status   02/06/2022 9.1 (L) 12.0 - 15.9 g/dL Final     Hematocrit   Date Value Ref Range Status   02/06/2022 27.2 (L) 34.0 - 46.6 % Final     MCV   Date Value Ref Range Status   02/06/2022 97.5 (H) 79.0 - 97.0 fL Final     MCH   Date Value Ref Range Status   02/06/2022 32.6 26.6 - 33.0 pg Final     MCHC   Date Value Ref Range Status   02/06/2022 33.5 31.5 - 35.7 g/dL Final     RDW   Date Value Ref Range Status   02/06/2022 15.9 (H) 12.3 - 15.4 % Final     RDW-SD   Date Value Ref Range Status    02/06/2022 57.5 (H) 37.0 - 54.0 fl Final     MPV   Date Value Ref Range Status   02/06/2022 11.1 6.0 - 12.0 fL Final     Platelets   Date Value Ref Range Status   02/06/2022 149 140 - 450 10*3/mm3 Final     Neutrophil %   Date Value Ref Range Status   02/06/2022 79.0 (H) 42.7 - 76.0 % Final     Lymphocyte %   Date Value Ref Range Status   02/06/2022 11.0 (L) 19.6 - 45.3 % Final     Monocyte %   Date Value Ref Range Status   02/06/2022 9.2 5.0 - 12.0 % Final     Eosinophil %   Date Value Ref Range Status   02/06/2022 0.0 (L) 0.3 - 6.2 % Final     Basophil %   Date Value Ref Range Status   02/06/2022 0.1 0.0 - 1.5 % Final     Immature Grans %   Date Value Ref Range Status   02/06/2022 0.7 (H) 0.0 - 0.5 % Final     Neutrophils, Absolute   Date Value Ref Range Status   02/06/2022 5.58 1.70 - 7.00 10*3/mm3 Final     Lymphocytes, Absolute   Date Value Ref Range Status   02/06/2022 0.78 0.70 - 3.10 10*3/mm3 Final     Monocytes, Absolute   Date Value Ref Range Status   02/06/2022 0.65 0.10 - 0.90 10*3/mm3 Final     Eosinophils, Absolute   Date Value Ref Range Status   02/06/2022 0.00 0.00 - 0.40 10*3/mm3 Final     Basophils, Absolute   Date Value Ref Range Status   02/06/2022 0.01 0.00 - 0.20 10*3/mm3 Final     Immature Grans, Absolute   Date Value Ref Range Status   02/06/2022 0.05 0.00 - 0.05 10*3/mm3 Final     nRBC   Date Value Ref Range Status   02/06/2022 0.0 0.0 - 0.2 /100 WBC Final        Basic Metabolic Panel    Sodium Sodium   Date Value Ref Range Status   02/06/2022 138 136 - 145 mmol/L Final   02/05/2022 141 136 - 145 mmol/L Final   02/04/2022 139 136 - 145 mmol/L Final      Potassium Potassium   Date Value Ref Range Status   02/06/2022 4.2 3.5 - 5.2 mmol/L Final   02/05/2022 3.7 3.5 - 5.2 mmol/L Final   02/04/2022 3.3 (L) 3.5 - 5.2 mmol/L Final      Chloride Chloride   Date Value Ref Range Status   02/06/2022 110 (H) 98 - 107 mmol/L Final   02/05/2022 108 (H) 98 - 107 mmol/L Final   02/04/2022 105 98 - 107  mmol/L Final      Bicarbonate No results found for: PLASMABICARB   BUN BUN   Date Value Ref Range Status   02/06/2022 18 6 - 20 mg/dL Final   02/05/2022 17 6 - 20 mg/dL Final   02/04/2022 12 6 - 20 mg/dL Final      Creatinine Creatinine   Date Value Ref Range Status   02/06/2022 0.78 0.57 - 1.00 mg/dL Final   02/05/2022 0.87 0.57 - 1.00 mg/dL Final   02/04/2022 0.58 0.57 - 1.00 mg/dL Final      Calcium Calcium   Date Value Ref Range Status   02/06/2022 8.1 (L) 8.6 - 10.5 mg/dL Final   02/05/2022 8.3 (L) 8.6 - 10.5 mg/dL Final   02/04/2022 8.0 (L) 8.6 - 10.5 mg/dL Final      Glucose      No components found for: GLUCOSE.*      XR Hip With or Without Pelvis 2 - 3 View Left   Final Result       1. Status post left total hip arthroplasty without evidence of complication.                    Wilian Christine M.D.          Electronically Signed and Approved By: Wilian Christine M.D. on 2/05/2022 at 9:57                           XR Hip 1 View Without Pelvis Left (Surgery Only)   Final Result       1. Status post left total hip arthroplasty without evidence of complication                    Wilian Christine M.D.          Electronically Signed and Approved By: Wilian Christine M.D. on 2/05/2022 at 10:28                           FL < 1 Hour   Final Result      XR Hip With or Without Pelvis 2 - 3 View Left   Final Result    Subcapital left femur fracture                    CECY MENDEZ MD          Electronically Signed and Approved By: CECY MENDEZ MD on 2/04/2022 at 19:23                           CT Lumbar Spine Without Contrast   Final Result    Multilevel degenerative disc disease change with no acute abnormality.                CECY MENDEZ MD          Electronically Signed and Approved By: CECY MENDEZ MD on 2/04/2022 at 18:15                           CT Pelvis Without Contrast   Final Result    Displaced subcapital femur fracture on the left.                CECY MENDEZ MD          Electronically Signed and Approved By: CECY  MD ANDREA on 2/04/2022 at 18:19

## 2022-02-06 NOTE — PLAN OF CARE
Goal Outcome Evaluation:  Plan of Care Reviewed With: patient        Progress: improving  Outcome Summary: Patient presents with Left hip weakness, decreased ROM and impairments in gait and balance. Pt will benefit from skilled PT to improve bed mobility, transfers, gait, and improve safety awareness to return home safely.

## 2022-02-06 NOTE — THERAPY EVALUATION
Acute Care - Physical Therapy Initial Evaluation  [facility]     Patient Name: Janneth Montes  : 1965  MRN: [id number]  Today's Date: 2022   Onset of Illness/Injury or Date of Surgery: 22  Visit Dx:     ICD-10-CM ICD-9-CM   1. Closed fracture of left hip, initial encounter (HCC)  S72.002A 820.8   2. Decreased activities of daily living (ADL)  Z78.9 V49.89   3. Difficulty walking  R26.2 719.7     Patient Active Problem List   Diagnosis   • Allergic rhinitis due to allergen   • Arthritis   • Diabetes mellitus, type II (HCC)   • Encounter for long-term (current) use of insulin (HCC)   • Epilepsy (HCC)   • Essential hypertension   • Hepatitis C   • HIV positive (HCC)   • Migraines   • Peripheral neuropathic pain   • Skin disease   • Closed fracture of neck of left femur (HCC)   • Hip fracture (HCC)     Past Medical History:   Diagnosis Date   • Allergic rhinitis due to allergen    • Arthritis    • Avascular necrosis (HCC) 2015   • Cervical cancer (HCC)    • Claustrophobia    • Diabetes mellitus, type 2 (HCC)    • Epilepsy (HCC)    • Essential hypertension    • Hepatitis C    • HIV positive (HCC)    • Migraines    • Neck abscess    • Neck mass    • Ovary cancer (HCC)    • Retained ureteral stent 08/10/2020   • Skin disease    • STD (female)    • Ureteral calculus, left 08/10/2020     Past Surgical History:   Procedure Laterality Date   • ANKLE ARTHROSCOPY W/ OPEN REPAIR     • APPENDECTOMY     • BACK SURGERY      X4   • COLONOSCOPY  2013   • HYSTERECTOMY     • LIVER BIOPSY     • OTHER SURGICAL HISTORY      METAL PLATES; SPINE DDD     PT Assessment (last 12 hours)     PT Evaluation and Treatment     Row Name 22 0900          Physical Therapy Time and Intention    Subjective Information complains of; pain  -RP     Document Type evaluation  -RP     Mode of Treatment physical therapy  -RP     Total Minutes, Physical Therapy 30  -RP     Patient Effort good  -RP      Symptoms Noted During/After Treatment increased pain  -     Row Name 02/06/22 0900          General Information    Onset of Illness/Injury or Date of Surgery 02/05/22  -RP     Prior Level of Function independent:  -RP     Existing Precautions/Restrictions fall  -RP     Barriers to Rehab none identified  -     Row Name 02/06/22 0900          Living Environment    Current Living Arrangements home/apartment/condo  -RP     Lives With spouse  -     Row Name 02/06/22 0900          Home Main Entrance    Number of Stairs, Main Entrance none  -     Row Name 02/06/22 0900          Home Use of Assistive/Adaptive Equipment    Equipment Currently Used at Home walker, rolling; cane, straight  -     Row Name 02/06/22 0900          Pain Scale: Word Pre/Post-Treatment    Pain: Word Scale, Pretreatment 6 - moderate-severe pain  -[facility] Name 02/06/22 0900          Range of Motion Comprehensive    General Range of Motion lower extremity range of motion deficits identified  -[facility] Name 02/06/22 0900          General Lower Extremity Assessment (Range of Motion)    Lower Extremity: Range of Motion RLE ROM WFL; hip, left: LE ROM; other (see comments)  -     Comment: Lower Extremity ROM L hip ROM 75% decreased  -     Row Name 02/06/22 0900          Strength Comprehensive (MMT)    General Manual Muscle Testing (MMT) Assessment lower extremity strength deficits identified  -     Row Name 02/06/22 0900          Lower Extremity (Manual Muscle Testing)    Lower Extremity: Manual Muscle Testing (MMT) left hip strength deficit  -     Comment, MMT: Lower Extremity L hip: 4- / 5 R hip : 4+ / 5  -[facility] Name 02/06/22 0900          Left Hip (Manual Muscle Testing)    Left Hip Manual Muscle Testing (MMT) flexion; aBduction; aDduction  -     Row Name 02/06/22 0900          Bed Mobility    Bed Mobility bed mobility (all) activities  -RP     All Activities, [place] (Bed Mobility) standby assist  -RP     Assistive  Device (Bed Mobility) bed rails  -RP     Row Name 02/06/22 0900          Transfers    Transfers sit-stand transfer  -RP     Sit-Stand [place] (Transfers) standby assist  -RP     Row Name 02/06/22 0900          Sit-Stand Transfer    Assistive Device (Sit-Stand Transfers) walker, front-wheeled  -RP     Row Name 02/06/22 0900          Gait/Stairs (Locomotion)    Gait/Stairs Locomotion gait/ambulation assistive device  -RP     [place] Level (Gait) contact guard  -RP     Assistive Device (Gait) walker, front-wheeled  -RP     Distance in Feet (Gait) 100  -RP     Left Sided Gait Deviations weight shift ability decreased  -RP     Row Name 02/06/22 0900          Balance    Balance Assessment standing dynamic balance  -RP     Dynamic Standing Balance mild impairment  -RP     Row Name 02/06/22 0900          Motor Skills    Motor Skills coordination  -RP     Coordination WFL  -RP     Row Name             Wound 02/05/22 0750 Left anterior hip Incision    Wound - Properties Group Placement Date: 02/05/22  -HB Placement Time: 0750  -HB Side: Left  -HB Orientation: anterior  -HB Location: hip  -HB Primary Wound Type: Incision  -HB     Retired Wound - Properties Group Date first assessed: 02/05/22  -HB Time first assessed: 0750  -HB Side: Left  -HB Location: hip  -HB Primary Wound Type: Incision  -HB     Row Name 02/06/22 0900          Plan of Care Review    Plan of Care Reviewed With patient  -RP     Progress improving  -RP     Outcome Summary Patient presents with Left hip weakness, decreased ROM and impairments in gait and balance. Pt will benefit from skilled PT to improve bed mobility, transfers, gait, and improve safety awareness to return home safely.  -RP     Row Name 02/06/22 0900          Physical Therapy Goals    Bed Mobility Goal Selection (PT) bed mobility, PT goal 1  -RP     Transfer Goal Selection (PT) transfer, PT goal 1  -RP     Gait Training Goal Selection (PT) gait training, PT goal 1  -RP     Row Name  02/06/22 0900          Bed Mobility Goal 1 (PT)    Activity/Assistive Device (Bed Mobility Goal 1, PT) bed mobility activities, all  -RP     [place] Level/Cues Needed (Bed Mobility Goal 1, PT) independent  -RP     Time Frame (Bed Mobility Goal 1, PT) 10 days  -RP     Row Name 02/06/22 0900          Transfer Goal 1 (PT)    Activity/Assistive Device (Transfer Goal 1, PT) transfers, all  -RP     [place] Level/Cues Needed (Transfer Goal 1, PT) independent  -RP     Time Frame (Transfer Goal 1, PT) 10 days  -RP     Row Name 02/06/22 0900          Positioning and Restraints    Pre-Treatment Position in bed  -RP     Post Treatment Position bed  -RP     Row Name 02/06/22 0900          Therapy Assessment/Plan (PT)    PT Diagnosis (PT) difficulty walking  -RP     Rehab Potential (PT) good, to achieve stated therapy goals  -RP     Problem List (PT) balance; coordination; mobility; motor control; range of motion (ROM); sensation; strength; pain; postural control  -RP     Row Name 02/06/22 0900          PT Evaluation Complexity    History, PT Evaluation Complexity 1-2 personal factors and/or comorbidities  -RP     Examination of Body Systems (PT Eval Complexity) 1-2 elements  -RP     Clinical Presentation (PT Evaluation Complexity) stable  -RP     Clinical Decision Making (PT Evaluation Complexity) low complexity  -RP     Overall Complexity (PT Evaluation Complexity) low complexity  -RP     Row Name 02/06/22 0900          Progress Summary (PT)    Progress Toward Functional Goals (PT) progress toward functional goals as expected  -RP           User Key  (r) = Recorded By, (t) = Taken By, (c) = Cosigned By    Initials Name Provider Type    RP Josefina Sandoval, PT Physical Therapist    Cristina Sutton, RN Registered Nurse                  PT Recommendation and Plan  Anticipated Discharge Disposition (PT): home with home health  Planned Therapy Interventions (PT): balance training, bed mobility training, gait training, home  exercise program, neuromuscular re-education, postural re-education, patient/family education, strengthening  Therapy Frequency (PT): daily  Progress Summary (PT)  Progress Toward Functional Goals (PT): progress toward functional goals as expected  Plan of Care Reviewed With: patient  Progress: improving  Outcome Summary: Patient presents with Left hip weakness, decreased ROM and impairments in gait and balance. Pt will benefit from skilled PT to improve bed mobility, transfers, gait, and improve safety awareness to return home safely.   Outcome Measures     Row Name 02/06/22 0900             How much help from another person do you currently need...    Turning from your back to your side while in flat bed without using bedrails? 4  -RP      Moving from lying on back to sitting on the side of a flat bed without bedrails? 4  -RP      Moving to and from a bed to a chair (including a wheelchair)? 3  -RP      Standing up from a chair using your arms (e.g., wheelchair, bedside chair)? 3  -RP      Climbing 3-5 steps with a railing? 2  -RP      To walk in hospital room? 3  -RP      AM-PAC 6 Clicks Score (PT) 19  -RP              Functional Assessment    Outcome Measure Options AM-PAC 6 Clicks Basic Mobility (PT)  -RP            User Key  (r) = Recorded By, (t) = Taken By, (c) = Cosigned By    Initials Name Provider Type    Josefina Eldridge, PT Physical Therapist                 Time Calculation:    PT Charges     Row Name 02/06/22 0915             Time Calculation    PT Received On 02/06/22  -RP      PT Goal Re-Cert Due Date 02/15/22  -RP              Timed Charges    72779 - Gait Training Minutes  10  -RP              Untimed Charges    PT Eval/Re-eval Minutes 20  -RP              Total Minutes    Timed Charges Total Minutes 10  -RP      Untimed Charges Total Minutes 20  -RP       Total Minutes 30  -RP            User Key  (r) = Recorded By, (t) = Taken By, (c) = Cosigned By    Initials Name Provider Type    Josefina Eldridge,  PT Physical Therapist              Therapy Charges for Today     Code Description Service Date Service Provider Modifiers Qty    [id number] HC GAIT TRAINING EA 15 MIN 2/6/2022 Josefina Sandoval, PT GP 1    [address] [place] PHYSICAL THERAPY EVALUATION LOW COMPLEX 20 MINS 2/6/2022 Josefina Sandoval, PT GP 1          PT G-Codes  Outcome Measure Options: AM-PAC 6 Clicks Basic Mobility (PT)  AM-PAC 6 Clicks Score (PT): 19  AM-PAC 6 Clicks Score (OT): 21    Josefina Sandoval PT  2/6/2022

## 2022-02-06 NOTE — PROGRESS NOTES
[facility]   Hospitalist Progress Note  Date: 2/6/2022  Patient Name: Janneth Montes    Subjective   Subjective     Chief Complaint:   Fall    Summary:   Janneth Montes is a 56 y.o. female  with HIV, cirrhosis fell yesterday after losing her balance.  Sustained left femur subcapital fracture with displacement.  Patient underwent operative repair with left total hip arthroplasty on February fifth.    Interval Followup: Positive for flatus postoperatively.  Pain well controlled.  Patient has been attempting to place her pain medications in her pockets, has been removed from the hospital yesterday due to intoxication.    Review of Systems   No nausea or vomiting no current pain no chest pain    Objective   Objective     Vitals:   Temp:  [97.2 °F (36.2 °C)-97.8 °F (36.6 °C)] 97.7 °F (36.5 °C)  Heart Rate:  [66-85] 83  Resp:  [10-18] 18  BP: ()/(48-64) 110/60  Physical Exam   Gen: NAD, WDWN female sitting up in the recliner comfortably  HEENT: NCAT, mmm  Resp: CTAB no wrr, no dyspnea  CV: RRR no mrg, no LE edema  GI: Abdomen soft NT, ND +bs  Psych: AOx3, normal mood and affect  MSK: Left hip bandages and ice in place      Result Review    Result Review:  I have personally reviewed the results from the time of this admission to 2/6/2022 14:22 EST and agree with these findings:  [x]  Laboratory  Glucose 172  Hemoglobin 10.4-9.1  [x]  Microbiology perioperative Covid pending  [x]  Radiology hip x-ray: Status post LORI  []  EKG/Telemetry   []  Cardiology/Vascular   []  Pathology  []  Old records  []  Other:    Assessment/Plan   Assessment / Plan     Assessment/Plan:  • Subcapital left femur fracture with displacement  • HIV  • Cirrhosis  • Diabetes  • Hypertension  • Anemia  • Hypokalemia      Plan:   • Continue HIV meds, Biktavry and dapsone--her  has left these meds at home for the last several days, I urged him to remember to bring them tomorrow so she does not miss any more doses  • Oral and  IV narcotics for pain control, Toradol as well  • PT OT, desires home health  • A1c within normal limits  • CBC in a.m. to monitor anemia  • Discontinue IV Dilaudid and IV morphine     DVT ppx: Lovenox  Diet: Carb consistent  Discussed with bedside RN

## 2022-02-07 NOTE — OUTREACH NOTE
Prep Survey      Responses   [facility]? [facility]   Is LACE score < 7 ? No   Emergency Room discharge w/ pulse ox? No   Eligibility [facility]   Hospital  [facility]   Date of Admission 02/04/22   Date of Discharge 02/07/22   Discharge Disposition Home-Health Care [facility]   Discharge diagnosis Subcapital left femur fracture with displacement TOTAL HIP ARTHROPLASTY  HIV   Does the patient have one of the following disease processes/diagnoses(primary or secondary)? Total Joint Replacement   Does the patient have Home health ordered? Yes   What is the Home health agency?  [facility]    Is there a DME ordered? Yes   What DME was ordered?  walker and 3in1.   [employer]   Prep survey completed? Yes          Louisa Beal RN

## 2022-02-07 NOTE — DISCHARGE SUMMARY
[facility]         HOSPITALIST  DISCHARGE SUMMARY    Patient Name: Janneth Montes  : 1965  MRN: [id number]    Date of Admission: 2022  Date of Discharge:  22  Primary Care Physician: Juli Cam APRN    Consultants:  Dr. Vadim Trejo, orthopedic surgery    Discharge Diagnosis:  · Subcapital left femur fracture with displacement  · HIV  · Cirrhosis  · Diabetes mellitus type II  · Hypertension  · Anemia  · Hypokalemia      Hospital Course     Hospital Course:  Janneth Montes is a 56 y.o. female  with HIV, cirrhosis fell yesterday after losing her balance.  Sustained left femur subcapital fracture with displacement.  Patient underwent operative repair with left total hip arthroplasty on February fifth.  Pain well controlled, patient is worked with physical and Occupational Therapy and able to ambulate with the use of the walker.  Patient prefers to go home with home health rather than rehab.  She has been discharged home today into the care of her .  I have stressed to her the importance of continue take her HIV medications on schedule and she plans to resume these when she returns home.    DISCHARGE Follow Up Recommendations:   Follow-up with PCP  Follow-up with orthopedic surgery  Pain control and DVT prophylaxis per orthopedic surgery      Day of Discharge     Vital Signs:  Temp:  [97.4 °F (36.3 °C)-98.2 °F (36.8 °C)] 98.2 °F (36.8 °C)  Heart Rate:  [76-86] 84  Resp:  [18] 18  BP: (102-135)/(53-68) 114/56  Physical Exam:   Gen: NAD, WDWN  Resp: no dyspnea  CV: no LE edema  GI: Abdomen soft +bs  Psych: AOx3, normal mood and affect    Discharge Details        Discharge Medications      New Medications      Instructions Start Date   apixaban 2.5 MG tablet tablet  Commonly known as: ELIQUIS   2.5 mg, Oral, Every 12 Hours Scheduled, Once eliquis is completed, begin ECASA 325mg po daily for 4 weeks      HYDROcodone-acetaminophen 7.5-325 MG per tablet  Commonly  known as: Norco   1-2 tablets, Oral, Every 4 Hours PRN         Continue These Medications      Instructions Start Date   amitriptyline 10 MG tablet  Commonly known as: ELAVIL   10 mg, Oral, Every Night at Bedtime      Bictegravir-Emtricitab-Tenofov -25 MG per tablet  Commonly known as: BIKTARVY   1 tablet, Oral, Daily      dapsone 100 MG tablet   1 tablet, Oral, Daily             Discharge Disposition:   Home-Health Care Svc    Discharge Condition: Stable    Diet:  Hospital:  Diet Order   Procedures   • Diet Regular; Consistent Carbohydrate       Discharge Activity:   Activity Instructions     Up WIth Assist            Future Appointments   Date Time Provider Department Center   2/18/2022  9:30 AM Ghislaine Mclean PA [facility]       Additional Instructions for the Follow-ups that You Need to Schedule     Ambulatory Referral to Home Health (Outpatient)   As directed      Face to Face Visit Date: 2/7/2022    Follow-up provider for Plan of Care?: I will be treating the patient on an ongoing basis.  Please send me the Plan of Care for signature.    Follow-up provider: RIZA TREJO [[id number]]    Reason/Clinical Findings: s/p thr    Describe mobility limitations that make leaving home difficult: s/p thr    Nursing/Therapeutic Services Requested: Skilled Nursing Physical Therapy Occupational Therapy    Skilled nursing orders: Pain management Wound care dressing/changes    PT orders: Total joint pathway Gait Training    Weight Bearing Status: As Tolerated    Occupational orders: Activities of daily living Home safety assessment    Frequency: 1 Week 1         Ambulatory Referral to Physical Therapy Evaluate and treat, POST OP   As directed      2-3x/week for 8-12 weeks  + modalities    Order Comments: 2-3x/week for 8-12 weeks + modalities     Specialty needed: Evaluate and treat POST OP         Discharge Follow-up with Specified Provider: Dr Trejo's office; 2 Weeks   As directed      To: Dr Trejo's office     Follow Up: 2 Weeks               Pertinent  and/or Most Recent Results     PROCEDURES:   Left total hip replacement    LAB RESULTS:      Lab 02/07/22  0423 02/06/22  0412 02/05/22 0441 02/04/22  1858   WBC 11.54* 7.07 4.37 5.30   HEMOGLOBIN 9.2* 9.1* 10.4* 10.3*   HEMATOCRIT 27.3* 27.2* 30.4* 30.0*   PLATELETS 188 149 169 183   NEUTROS ABS  --  5.58 2.50 3.83   IMMATURE GRANS (ABS)  --  0.05 0.06* 0.04   LYMPHS ABS  --  0.78 1.18 0.67*   MONOS ABS  --  0.65 0.59 0.75   EOS ABS  --  0.00 0.03 0.00   MCV 97.5* 97.5* 93.0 94.9   PROTIME  --   --  11.9 12.6*         Lab 02/06/22 0412 02/05/22 0441 02/04/22 1858   SODIUM 138 141 139   POTASSIUM 4.2 3.7 3.3*   CHLORIDE 110* 108* 105   CO2 18.8* 24.8 22.8   ANION GAP 9.2 8.2 11.2   BUN 18 17 12   CREATININE 0.78 0.87 0.58   GLUCOSE 154* 133* 109*   CALCIUM 8.1* 8.3* 8.0*   MAGNESIUM  --  2.1 1.6   PHOSPHORUS  --  3.1 2.6   HEMOGLOBIN A1C 4.70*  --   --          Lab 02/04/22 1858   TOTAL PROTEIN 7.1   ALBUMIN 2.40*   GLOBULIN 4.7   ALT (SGPT) 11   AST (SGOT) 29   BILIRUBIN 1.6*   ALK PHOS 87         Lab 02/05/22 0441 02/04/22  1858   PROTIME 11.9 12.6*   INR 1.15* 1.24*             Lab 02/05/22 0441 02/04/22 1858   IRON  --  48   IRON SATURATION  --  46   TIBC  --  104*   TRANSFERRIN  --  70*   FERRITIN 1,520.00*  --          Brief Urine Lab Results  (Last result in the past 365 days)      Color   Clarity   Blood   Leuk Est   Nitrite   Protein   CREAT   Urine HCG        04/30/21 1118 Rina   Clear   Negative   Negative   Negative   30 mg/dL               Microbiology Results (last 10 days)     ** No results found for the last 240 hours. **          XR Hip 1 View Without Pelvis Left (Surgery Only)    Result Date: 2/5/2022  Impression:   1. Status post left total hip arthroplasty without evidence of complication      Wilian Christine M.D.       Electronically Signed and Approved By: Wilian Christine M.D. on 2/05/2022 at 10:28             CT Lumbar Spine Without  Contrast    Result Date: 2/4/2022  Impression:  Multilevel degenerative disc disease change with no acute abnormality.     CECY MENDEZ MD       Electronically Signed and Approved By: CECY MENDEZ MD on 2/04/2022 at 18:15             CT Pelvis Without Contrast    Result Date: 2/4/2022  Impression:  Displaced subcapital femur fracture on the left.     CECY MENDEZ MD       Electronically Signed and Approved By: CECY MENDEZ MD on 2/04/2022 at 18:19             XR Hip With or Without Pelvis 2 - 3 View Left    Result Date: 2/5/2022  Impression:   1. Status post left total hip arthroplasty without evidence of complication.      Wilian Christine M.D.       Electronically Signed and Approved By: Wilian Christine M.D. on 2/05/2022 at 9:57             XR Hip With or Without Pelvis 2 - 3 View Left    Result Date: 2/4/2022  Impression:  Subcapital left femur fracture      CECY MENDEZ MD       Electronically Signed and Approved By: CECY MENDEZ MD on 2/04/2022 at 19:23               Time spent on Discharge including face to face service: Greater than 35 minutes    Electronically signed by Nai Blackwell MD, 02/07/22, 12:55 PM EST.

## 2022-02-07 NOTE — THERAPY TREATMENT NOTE
Acute Care - Physical Therapy Treatment Note   Estefanía     Patient Name: Janneth Montes  : 1965  MRN: [id number]  Today's Date: 2022   Onset of Illness/Injury or Date of Surgery: 22  Visit Dx:     ICD-10-CM ICD-9-CM   1. Closed fracture of left hip, initial encounter ([facility])  S72.002A 820.8   2. Decreased activities of daily living (ADL)  Z78.9 V49.89   3. Difficulty walking  R26.2 719.7     Patient Active Problem List   Diagnosis   • Allergic rhinitis due to allergen   • Arthritis   • Diabetes mellitus, type II (HCC)   • Encounter for long-term (current) use of insulin (HCC)   • Epilepsy (HCC)   • Essential hypertension   • Hepatitis C   • HIV positive (HCC)   • Migraines   • Peripheral neuropathic pain   • Skin disease   • Closed fracture of neck of left femur (HCC)   • Hip fracture (HCC)     Past Medical History:   Diagnosis Date   • Allergic rhinitis due to allergen    • Arthritis    • Avascular necrosis (HCC) 2015   • Cervical cancer (HCC)    • Claustrophobia    • Diabetes mellitus, type 2 (HCC)    • Epilepsy (HCC)    • Essential hypertension    • Hepatitis C    • HIV positive (HCC)    • Migraines    • Neck abscess    • Neck mass    • Ovary cancer (HCC)    • Retained ureteral stent 08/10/2020   • Skin disease    • STD (female)    • Ureteral calculus, left 08/10/2020     Past Surgical History:   Procedure Laterality Date   • ANKLE ARTHROSCOPY W/ OPEN REPAIR     • APPENDECTOMY     • BACK SURGERY      X4   • COLONOSCOPY  2013   • HYSTERECTOMY     • LIVER BIOPSY     • OTHER SURGICAL HISTORY      METAL PLATES; SPINE DDD   • TOTAL HIP ARTHROPLASTY Left 2022    Procedure: TOTAL HIP ARTHROPLASTY;  Surgeon: Jarad Trejo MD;  Location: [facility] MAIN OR;  Service: Orthopedics;  Laterality: Left;     PT Assessment (last 12 hours)     PT Evaluation and Treatment     Row Name 22 1100          Physical Therapy Time and Intention    Subjective Information no  complaints  -JUAN RAMON     Document Type therapy note (daily note)  -JUAN RAMON     Mode of Treatment individual therapy; physical therapy  -JUAN RAMON     Patient Effort good  -JUAN RAMON     Symptoms Noted During/After Treatment none  -JUAN RAMON     Row Name 02/07/22 1100          General Information    Patient Observations alert; cooperative; agree to therapy  -JUAN RAMON     Row Name 02/07/22 1100          Transfers    Transfers car transfer  -JUAN RAMON     Maintains Weight-bearing Status (Transfers) able to maintain  -JUAN RAMON     Row Name 02/07/22 1100          Car Transfer    Type (Car Transfer) sit-stand; stand-sit  -JUAN RAMON     [place] Level (Car Transfer) independent  -JUAN RAMON     Assistive Device (Car Transfer) walker, front-wheeled  -JUAN RAMON     Row Name 02/07/22 1100          Gait/Stairs (Locomotion)    Gait/Stairs Locomotion gait/ambulation assistive device  -JUAN RAMON     [place] Level (Gait) contact guard  -JUAN RAMON     Assistive Device (Gait) walker, front-wheeled  -JUAN RAMON     Distance in Feet (Gait) 200  -JUAN RAMON     Row Name             Wound 02/05/22 0750 Left anterior hip Incision    Wound - Properties Group Placement Date: 02/05/22  -HB Placement Time: 0750  -HB Side: Left  -HB Orientation: anterior  -HB Location: hip  -HB Primary Wound Type: Incision  -HB     Retired Wound - Properties Group Date first assessed: 02/05/22  -HB Time first assessed: 0750  -HB Side: Left  -HB Location: hip  -HB Primary Wound Type: Incision  -HB     Row Name 02/07/22 1100          Plan of Care Review    Plan of Care Reviewed With patient  -JUAN RAMON     Progress improving  -JUAN RAMON     Outcome Summary Pt continues to benefit from skilled inpatient physical therapy to improve upon their functional gait and transfers.  -JUAN RAMON     Row Name 02/07/22 1100          Progress Summary (PT)    Progress Toward Functional Goals (PT) progress toward functional goals as expected  -JUAN RAMON     Daily Progress Summary (PT) Pt continues to benefit from skilled inpatient physical therapy to improve upon their functional gait and  transfers.  -JUAN RAMON           User Key  (r) = Recorded By, (t) = Taken By, (c) = Cosigned By    Initials Name Provider Type    HB Cristina Walls, RN Registered Nurse    Saul Hopson, MARIA INES Physical Therapist                  PT Recommendation and Plan     Progress Summary (PT)  Progress Toward Functional Goals (PT): progress toward functional goals as expected  Daily Progress Summary (PT): Pt continues to benefit from skilled inpatient physical therapy to improve upon their functional gait and transfers.  Plan of Care Reviewed With: patient  Progress: improving  Outcome Summary: Pt continues to benefit from skilled inpatient physical therapy to improve upon their functional gait and transfers.   Outcome Measures     Row Name 02/07/22 1100 02/06/22 0900          How much help from another person do you currently need...    Turning from your back to your side while in flat bed without using bedrails? 4  -JUAN RAMON 4  -RP     Moving from lying on back to sitting on the side of a flat bed without bedrails? 4  -JUAN RAMON 4  -RP     Moving to and from a bed to a chair (including a wheelchair)? 4  -JUAN RAMON 3  -RP     Standing up from a chair using your arms (e.g., wheelchair, bedside chair)? 4  -JUAN RAMON 3  -RP     Climbing 3-5 steps with a railing? 3  -JUAN RAMON 2  -RP     To walk in hospital room? 3  -JUAN RAMON 3  -RP     AM-PAC 6 Clicks Score (PT) 22  -JUAN RAMON 19  -RP            Functional Assessment    Outcome Measure Options AM-PAC 6 Clicks Basic Mobility (PT)  -JUAN RAMON AM-PAC 6 Clicks Basic Mobility (PT)  -RP           User Key  (r) = Recorded By, (t) = Taken By, (c) = Cosigned By    Initials Name Provider Type    Josefina Eldridge, PT Physical Therapist    Saul Hopson PT Physical Therapist                 Time Calculation:    PT Charges     Row Name 02/07/22 1123             Time Calculation    PT Received On 02/07/22  -JUAN RAMON              Timed Charges    10746 - Gait Training Minutes  10  -JUAN RAMON      24461 - PT Therapeutic Activity Minutes 10  -JUAN RAMON               Total Minutes    Timed Charges Total Minutes 20  -JUAN RAMON       Total Minutes 20  -JUAN RAMON            User Key  (r) = Recorded By, (t) = Taken By, (c) = Cosigned By    Initials Name Provider Type    Saul Hopson, PT Physical Therapist              Therapy Charges for Today     Code Description Service Date Service Provider Modifiers Qty    80282220166 HC GAIT TRAINING EA 15 MIN 2/7/2022 Saul Partida PT GP 1          PT G-Codes  Outcome Measure Options: AM-PAC 6 Clicks Basic Mobility (PT)  AM-PAC 6 Clicks Score (PT): 22  AM-PAC 6 Clicks Score (OT): 21    Saul Partida PT  2/7/2022

## 2022-02-07 NOTE — PLAN OF CARE
Goal Outcome Evaluation:   Pt stable throughout the night. Medicated for pain x2. No concerns or complaints noted.

## 2022-02-07 NOTE — SIGNIFICANT NOTE
02/07/22 1035   Plan   Final Discharge Disposition Code 06 - home with home health care   Final Note [facility]

## 2022-02-07 NOTE — PLAN OF CARE
Goal Outcome Evaluation:         PATIENT WILL BE DISCHARGING HOME TODAY. VITAL SIGNS STABLE. PATIENT AMBULATING WELL WITH THERAPY. WALKER AND BEDSIDE COMMODE NEEDED UPON DISCHARGE. FAMILY AT BEDSIDE. PAIN WELL CONTROLLED.

## 2022-02-08 NOTE — OUTREACH NOTE
Call Center TCM Note      Responses   [facility] from? [facility]   Does the patient have one of the following disease processes/diagnoses(primary or secondary)? Total Joint Replacement   Joint surgery performed? Hip   TCM attempt successful? No  [verbal release on file]   Unsuccessful attempts Attempt 1          Antoinette Martinez RN    2/8/2022, 14:21 EST

## 2022-02-08 NOTE — OUTREACH NOTE
Call Center TCM Note      Responses   [facility] discharged from? [facility]   Does the patient have one of the following disease processes/diagnoses(primary or secondary)? Total Joint Replacement   Joint surgery performed? Hip   TCM attempt successful? Yes   Call start time 1530   Call end time 1538   Medication alerts for this patient LESLIE   Does the patient have all medications related to this admission filled (includes all antibiotics, pain medications, etc.) Yes   Is the patient taking all medications as directed (includes completed medication regime)? Yes   Is the patient able to teach back alternate methods of pain control? Reposition,  Ice,  Short, frequent activity   Medication comments Pt reports issues with obtaining her pain medication at discharge yesterday but she was able to  today--taking as ordered   Comments regarding appointments Orthopedic 2/18/22   Does the patient have a follow up appointment with their surgeon? Yes   Has the patient kept scheduled appointments due by today? N/A   Comments Pt prefers to self-schedule PCP   What is the Home health agency?  [facility]    Has home health visited the patient within 72 hours of discharge? Yes   What DME was ordered?  walker and 3in[employer].   [employer]   Has all DME been delivered? Yes   Has the patient began therapy sessions (either in the home or as an out patient)? Yes   If the patient has started attending therapy, what post op day did they begin to attend (either in home or as an out patient)?   PT started in home today 2/8/22   Does the patient have a wound vac in place? No   Has the patient fallen since discharge? No   Did the patient receive a copy of their discharge instructions? Yes   Nursing interventions Reviewed instructions with patient   What is the patient's perception of their functional status since discharge? Improving   Is the patient able to teach back signs and symptoms of infection? Temp >100.4 for 24h or  longer,  Blisters around incision,  Incisional drainage,  Severe discomfort or pain,  Changes in mobility,  Shortness of breath or chest pain,  Increased swelling or redness around incision (not associated with surgical edema)   Is the patient able to teach back how to prevent infection? Check incision daily,  No tub baths, hot tub or swimming,  No lotion or creams,  Shower only as directed by surgeon,  Keep incision covered if drainage   Is the patient able to teach back signs and symptoms of DVT? Redness in calf,  Severe pain in calf,  Swelling in calf,  Shortness of breath or chest pain,  Area hot to touch   Is the patient/caregiver able to teach back the hierarchy of who to call/visit for symptoms/problems? PCP, Specialist, Home health nurse, Urgent Care, ED, 911 Yes   TCM call completed? Yes   Wrap up additional comments Pt very sleepy during encounter--confirmed appt, confirmed HH visit, picked up medications and reviewed routine surgical care, s/s infection and dressing changes.  Pt verbalized no concerns today.           Antoinette Martinez RN    2/8/2022, 15:41 EST

## 2022-02-15 NOTE — OUTREACH NOTE
Total Joint Week 2 Survey      Responses   [facility] facility [facility] discharged from? [facility]   Does the patient have one of the following disease processes/diagnoses(primary or secondary)? Total Joint Replacement   Joint surgery performed? Hip   Week 2 attempt successful? No   Unsuccessful attempts Attempt 1          Dang Esparza RN

## 2022-02-16 NOTE — OUTREACH NOTE
Total Joint Week 2 Survey      Responses   [facility] patient discharged from? [facility]   Does the patient have one of the following disease processes/diagnoses(primary or secondary)? Total Joint Replacement   Joint surgery performed? Hip   Week 2 attempt successful? Yes   Call start time 1345   Call end time 1349   Has the patient been back in either the hospital or Emergency Department since discharge? No   Medication alerts for this patient ELIQUIS   Is the patient taking all medications as directed (includes completed medication regime)? Yes   Comments regarding appointments Orthopedic 2/18/22   What is the Home health agency?  [facility]    Has [place] health visited the patient within 72 hours of discharge? Yes   Has the patient began therapy sessions (either in the home or as an out patient)? Yes   If the patient has started attending therapy, what post op day did they begin to attend (either in home or as an out patient)?   Pt visiting 3 x per week.   Does the patient have a wound vac in place? No   Has the patient fallen since discharge? No   What is the patient's perception of their functional status since discharge? Improving   Week 2 call completed? Yes   Wrap up additional comments Pt improving this week. Pt is participating in therapy and using a walker for assistance. Pt to see ortho this week.          Katheryn Humphries RN

## 2022-02-18 PROBLEM — Z47.89 AFTERCARE FOLLOWING SURGERY OF THE MUSCULOSKELETAL SYSTEM: Status: ACTIVE | Noted: 2022-01-01

## 2022-02-18 NOTE — ASSESSMENT & PLAN NOTE
Staples removed, wound care discussed, x-rays taken and reviewed.  Discussed with patient she should clean the wound daily with warm water and antibacterial soap, leave open to air to dry, do not apply any ointments.  Continue home health physical therapy, wean off walker as tolerated.  Patient has a cane at home to transition to next.  Encourage home exercises and walking.  We will see her in 4 weeks for recheck with no x-ray at that time.

## 2022-02-18 NOTE — PROGRESS NOTES
"Chief Complaint  Pain of the Left Hip    Subjective          Janneth Montes presents to [facility] for follow-up on left hip status post left LORI performed by Dr. Trejo 2/5/2022.  Patient is using a walker for ambulation today.  States things are going well, she has home health physical therapy coming a couple times a week as well as doing home exercises.  States she has a lot of soreness and swelling around the hip.  States the wound has been bleeding quite a bit.  Wound is dry today.    Objective   Allergies   Allergen Reactions   • Ciprofloxacin Hives   • Sulfate Hives   • Cephalexin Rash   • Metronidazole Rash   • Sulfamethoxazole-Trimethoprim Rash       Vital Signs:   Pulse 114   Ht 172.7 cm (68\")   Wt 71.7 kg (158 lb)   SpO2 96%   BMI 24.02 kg/m²       Physical Exam  Constitutional:       Appearance: Normal appearance. Patient is well-developed and normal weight.   HENT:      Head: Normocephalic.      Right Ear: Hearing and external ear normal.      Left Ear: Hearing and external ear normal.      Nose: Nose normal.   Eyes:      Conjunctiva/sclera: Conjunctivae normal.   Cardiovascular:      Rate and Rhythm: Normal rate.   Pulmonary:      Effort: Pulmonary effort is normal.      Breath sounds: No wheezing or rales.   Abdominal:      Palpations: Abdomen is soft.      Tenderness: There is no abdominal tenderness.   Musculoskeletal:      Cervical back: Normal range of motion.   Skin:     Findings: No rash.   Neurological:      Mental Status: Patient is alert and oriented to person, place, and time.   Psychiatric:         Mood and Affect: Mood and affect normal.         Judgment: Judgment normal.     Ortho Exam  Left hip: Skin intact with well-healing surgical incision, no erythema dehiscence or purulent drainage, good hip flexion extension and abduction, no pain with internal or external rotation, gait antalgic while using walker, sensation intact in posterior tib pulses 2+, " good range of motion left knee ankle and digits  Result Review :            Imaging Results (Most Recent)     Procedure Component Value Units Date/Time    XR Hip With or Without Pelvis 2 - 3 View Left [[id number]] Resulted: 02/18/22 0954     Updated: 02/18/22 0954    Narrative:      X-Ray Report:  Study: X-rays ordered, taken in the office, and reviewed today  Site: Left hip xray  Indication: Pain  View: AP and Lateral view(s)  Findings: Hardware intact from left LORI without failure or loosening, no   malalignment or acute osseous or soft tissue abnormalities are noted  Prior studies available for comparison: yes                   Assessment and Plan    Problem List Items Addressed This Visit        Musculoskeletal and Injuries    Aftercare following surgery of left total hip arthroplasty    Current Assessment & Plan     Staples removed, wound care discussed, x-rays taken and reviewed.  Discussed with patient she should clean the wound daily with warm water and antibacterial soap, leave open to air to dry, do not apply any ointments.  Continue home health physical therapy, wean off walker as tolerated.  Patient has a cane at home to transition to next.  Encourage home exercises and walking.  We will see her in 4 weeks for recheck with no x-ray at that time.           Other Visit Diagnoses     Left hip pain    -  Primary    Relevant Orders    XR Hip With or Without Pelvis 2 - 3 View Left (Completed)          Follow Up   Return in about 4 weeks (around 3/18/2022) for Recheck.  Patient Instructions   Staples removed, wound care discussed, x-rays taken and reviewed.  Discussed with patient she should clean the wound daily with warm water and antibacterial soap, leave open to air to dry, do not apply any ointments.  Continue home health physical therapy, wean off walker as tolerated.  Patient has a cane at home to transition to next.  Encourage home exercises and walking.  We will see her in 4 weeks for recheck with no  x-ray at that time.    Patient was given instructions and counseling regarding her condition or for health maintenance advice. Please see specific information pulled into the AVS if appropriate.

## 2022-02-24 NOTE — PROGRESS NOTES
The ABCs of the Annual Wellness Visit  Subsequent Medicare Wellness Visit    Chief Complaint   Patient presents with   • Medicare Wellness-subsequent      Subjective    History of Present Illness:  Janneth Montes is a 56 y.o. female who presents for a Subsequent Medicare Wellness Visit.    The following portions of the patient's history were reviewed and   updated as appropriate: allergies, current medications, past family history, past medical history, past social history, past surgical history and problem list.    Compared to one year ago, the patient feels her physical   health is the same.    Compared to one year ago, the patient feels her mental   health is the same.    Recent Hospitalizations:  This patient has had a [facility] admission record on file within the last 365 days.    Current Medical Providers:  Patient Care Team:  Juli Cam APRN as PCP - General (Nurse Practitioner)    Outpatient Medications Prior to Visit   Medication Sig Dispense Refill   • amitriptyline (ELAVIL) 10 MG tablet Take 10 mg by mouth every night at bedtime.     • apixaban (ELIQUIS) 2.5 MG tablet tablet Take 1 tablet by mouth Every 12 (Twelve) Hours. Once eliquis is completed, begin ECASA 325mg po daily for 4 weeks 20 tablet 0   • Bictegravir-Emtricitab-Tenofov (BIKTARVY) -25 MG per tablet Take 1 tablet by mouth Daily.     • dapsone 100 MG tablet Take 1 tablet by mouth Daily.     • HYDROcodone-acetaminophen (Norco) 7.5-325 MG per tablet Take 1 tablet by mouth Every 4 (Four) Hours As Needed for Moderate Pain . 42 tablet 0     No facility-administered medications prior to visit.       Opioid medication/s are on active medication list.  and I have evaluated her active treatment plan and pain score trends (see table).  There were no vitals filed for this visit.  I have reviewed the chart for potential of high risk medication and harmful drug interactions in the elderly.            Aspirin is not on active  "medication list.  Aspirin use is not indicated based on review of current medical condition/s. Risk of harm outweighs potential benefits.  .    Patient Active Problem List   Diagnosis   • Allergic rhinitis due to allergen   • Arthritis   • Diabetes mellitus, type II (HCC)   • Encounter for long-term (current) use of insulin (HCC)   • Epilepsy (HCC)   • Essential hypertension   • Hepatitis C   • HIV positive (HCC)   • Migraines   • Peripheral neuropathic pain   • Skin disease   • Closed fracture of neck of left femur (HCC)   • Hip fracture (HCC)   • Aftercare following surgery of left total hip arthroplasty     Advance Care Planning  Advance Directive is on file.  ACP discussion was held with the patient during this visit. Patient has an advance directive in EMR which is still valid.           Objective    Vitals:    02/24/22 0835   BP: 125/65   Pulse: 103   SpO2: 96%   Weight: 69.4 kg (153 lb)   Height: 172.7 cm (68\")     BMI Readings from Last 1 Encounters:   02/24/22 23.26 kg/m²   BMI is within normal parameters. No follow-up required.    Does the patient have evidence of cognitive impairment? No      Lab Results   Component Value Date    HGBA1C 4.70 (L) 02/06/2022            HEALTH RISK ASSESSMENT    Smoking Status:  Social History     Tobacco Use   Smoking Status Never Smoker   Smokeless Tobacco Never Used     Alcohol Consumption:  Social History     Substance and Sexual Activity   Alcohol Use Never     Fall Risk Screen:    FORREST Fall Risk Assessment was completed, and patient is at MODERATE risk for falls. Assessment completed on:2/24/2022    Depression Screening:  PHQ-2/PHQ-9 Depression Screening 2/24/2022   Little interest or pleasure in doing things 0   Feeling down, depressed, or hopeless 0   Trouble falling or staying asleep, or sleeping too much 0   Feeling tired or having little energy 0   Poor appetite or overeating 0   Feeling bad about yourself - or that you are a failure or have let yourself or your " family down 0   Trouble concentrating on things, such as reading the newspaper or watching television 0   Moving or speaking so slowly that other people could have noticed. Or the opposite - being so fidgety or restless that you have been moving around a lot more than usual 0   Thoughts that you would be better off dead, or of hurting yourself in some way 0   Total Score 0   If you checked off any problems, how difficult have these problems made it for you to do your work, take care of things at home, or get along with other people? Not difficult at all       Health Habits and Functional and Cognitive Screening:  Functional & Cognitive Status 2/24/2022   Do you have difficulty preparing food and eating? No   Do you have difficulty bathing yourself, getting dressed or grooming yourself? No   Do you have difficulty using the toilet? No   Do you have difficulty moving around from place to place? No   Do you have trouble with steps or getting out of a bed or a chair? No   Current Diet Well Balanced Diet   Dental Exam Up to date   Eye Exam Up to date   Exercise (times per week) 2 times per week   Current Exercises Include Walking   Do you need help using the phone?  No   Are you deaf or do you have serious difficulty hearing?  No   Do you need help with transportation? No   Do you need help shopping? No   Do you need help preparing meals?  No   Do you need help with housework?  No   Do you need help with laundry? No   Do you need help taking your medications? No   Do you need help managing money? No   Do you ever drive or ride in a car without wearing a seat belt? No   Have you felt unusual stress, anger or loneliness in the last month? No   Who do you live with? Spouse   If you need help, do you have trouble finding someone available to you? No   Have you been bothered in the last four weeks by sexual problems? No   Do you have difficulty concentrating, remembering or making decisions? No       Age-appropriate Screening  Schedule:  Refer to the list below for future screening recommendations based on patient's age, sex and/or medical conditions. Orders for these recommended tests are listed in the plan section. The patient has been provided with a written plan.    Health Maintenance   Topic Date Due   • URINE MICROALBUMIN  Never done   • TDAP/TD VACCINES (1 - Tdap) Never done   • MAMMOGRAM  12/10/2016   • DIABETIC FOOT EXAM  Never done   • DIABETIC EYE EXAM  Never done   • ZOSTER VACCINE (1 of 2) 02/24/2022 (Originally 5/4/2016)   • INFLUENZA VACCINE  02/24/2023 (Originally 8/1/2021)   • HEMOGLOBIN A1C  08/06/2022              Assessment/Plan   CMS Preventative Services Quick Reference  Risk Factors Identified During Encounter  None Identified  The above risks/problems have been discussed with the patient.  Follow up actions/plans if indicated are seen below in the Assessment/Plan Section.  Pertinent information has been shared with the patient in the After Visit Summary.    Diagnoses and all orders for this visit:    1. Medicare annual wellness visit, subsequent (Primary)    2. Type 2 diabetes mellitus with hypoglycemia without coma, without long-term current use of insulin (HCC)  Comments:  controlled with diet  Orders:  -     CBC w AUTO Differential; Future  -     Comprehensive metabolic panel; Future  -     Lipid panel; Future  -     TSH; Future  -     Hemoglobin A1c; Future  -     T4, free; Future  -     MicroAlbumin, Urine, Random - Urine, Clean Catch; Future    3. Encounter for screening mammogram for malignant neoplasm of breast  -     Mammo Screening Digital Tomosynthesis Bilateral With CAD        Follow Up:   Return in about 1 year (around 2/24/2023) for Medicare Wellness.     An After Visit Summary and PPPS were made available to the patient.

## 2022-02-24 NOTE — PROGRESS NOTES
Chief Complaint  Femur fx, migraine, trouble swallowing    Subjective            Janneth Montes presents to [facility]  Pt is here to f/u for femur fx of the (L) leg. Pt states she is doing PT at home for now 3x a week with caretenders.. Pt has been seeing Dr. Trejo. PT has a f/u 3/22/22    Pt states she has had trouble swallowing and hasn't been able to eat. Pt states this didn't occur until she left the hospital after her femur fx. Pt has not tried anything OTC. PT has been drinking pineapple juice. Pt has not had solid food in 6 days. Pt has been staying hydrated.    Pt will have MAW today in office.     Pt also reports she needs a refill on her migraine medication, maxalt 10mg.        PMH  Past Medical History:   Diagnosis Date   • Allergic rhinitis due to allergen    • Arthritis    • Avascular necrosis (HCC) 09/29/2015   • Cervical cancer (HCC)    • Claustrophobia    • Diabetes mellitus, type 2 (HCC)    • Epilepsy (HCC)    • Essential hypertension    • Hepatitis C    • HIV positive (HCC)    • Migraines    • Neck abscess    • Neck mass    • Ovary cancer (HCC)    • Retained ureteral stent 08/10/2020   • Skin disease    • STD (female)    • Ureteral calculus, left 08/10/2020       ALLERGY  Allergies   Allergen Reactions   • Ciprofloxacin Hives   • Sulfate Hives   • Cephalexin Rash   • Metronidazole Rash   • Sulfamethoxazole-Trimethoprim Rash        SURGICALHX  Past Surgical History:   Procedure Laterality Date   • ANKLE ARTHROSCOPY W/ OPEN REPAIR  1989   • APPENDECTOMY  1998   • BACK SURGERY      X4   • COLONOSCOPY  05/22/2013   • HYSTERECTOMY  1995   • LIVER BIOPSY  2014   • OTHER SURGICAL HISTORY      METAL PLATES; SPINE DDD   • TOTAL HIP ARTHROPLASTY Left 2/5/2022    Procedure: TOTAL HIP ARTHROPLASTY;  Surgeon: Jarad Trejo MD;  Location: [facility];  Service: Orthopedics;  Laterality: Left;        SOCX  Social History     Tobacco Use   • Smoking status: Never  "Smoker   • Smokeless tobacco: Never Used   Substance Use Topics   • Alcohol use: Never       FAMHX  Family History   Problem Relation Age of Onset   • Ovarian cancer Mother    • Arthritis Mother    • Arthritis Sister    • Arthritis Brother    • Breast cancer Other    • Cervical cancer Other         MEDSIGONLY  Current Outpatient Medications on File Prior to Visit   Medication Sig   • amitriptyline (ELAVIL) 10 MG tablet Take 10 mg by mouth every night at bedtime.   • apixaban (ELIQUIS) 2.5 MG tablet tablet Take 1 tablet by mouth Every 12 (Twelve) Hours. Once eliquis is completed, begin ECASA 325mg po daily for 4 weeks   • Bictegravir-Emtricitab-Tenofov (BIKTARVY) -25 MG per tablet Take 1 tablet by mouth Daily.   • dapsone 100 MG tablet Take 1 tablet by mouth Daily.   • HYDROcodone-acetaminophen (Norco) 7.5-325 MG per tablet Take 1 tablet by mouth Every 4 (Four) Hours As Needed for Moderate Pain .   • [DISCONTINUED] rizatriptan (MAXALT) 10 MG tablet Take 10 mg by mouth 1 (One) Time As Needed for Migraine. May repeat in 2 hours if needed     No current facility-administered medications on file prior to visit.       Health Maintenance Due   Topic Date Due   • URINE MICROALBUMIN  Never done   • TDAP/TD VACCINES (1 - Tdap) Never done   • MAMMOGRAM  12/10/2016   • DIABETIC FOOT EXAM  Never done   • DIABETIC EYE EXAM  Never done   • COVID-19 Vaccine (2 - Pfizer risk 4-dose series) 07/13/2021       Objective     /65   Pulse 103   Ht 172.7 cm (68\")   Wt 69.4 kg (153 lb)   SpO2 96%   BMI 23.26 kg/m²       Physical Exam  Constitutional:       General: She is not in acute distress.     Appearance: Normal appearance. She is not ill-appearing.   HENT:      Head: Normocephalic and atraumatic.      Right Ear: Tympanic membrane, ear canal and external ear normal.      Left Ear: Tympanic membrane, ear canal and external ear normal.      Mouth/Throat:      Mouth: Mucous membranes are moist.      Pharynx: Oropharynx is " clear. No oropharyngeal exudate or posterior oropharyngeal erythema.   Cardiovascular:      Rate and Rhythm: Normal rate and regular rhythm.      Heart sounds: Normal heart sounds. No murmur heard.      Pulmonary:      Effort: Pulmonary effort is normal. No respiratory distress.      Breath sounds: Normal breath sounds.   Chest:      Chest wall: No tenderness.   Abdominal:      General: There is no distension.      Palpations: There is no mass.      Tenderness: There is no abdominal tenderness. There is no guarding.   Musculoskeletal:         General: No swelling or tenderness. Normal range of motion.      Cervical back: Normal range of motion and neck supple.      Comments: Ambulating with a walker   Skin:     General: Skin is warm and dry.      Findings: No rash.   Neurological:      General: No focal deficit present.      Mental Status: She is alert and oriented to person, place, and time. Mental status is at baseline.      Gait: Gait normal.   Psychiatric:         Mood and Affect: Mood normal.         Behavior: Behavior normal.         Thought Content: Thought content normal.         Judgment: Judgment normal.           Result Review :                           Assessment and Plan        Diagnoses and all orders for this visit:    1. Dysphasia (Primary)  -     Ambulatory Referral to ENT (Otolaryngology)    2. Other closed fracture of left femur with routine healing, unspecified portion of femur, subsequent encounter  Comments:  pt being managed by Ortho, keep f/u appt with Dr. Trejo, continue home health PT    3. Other migraine with status migrainosus, not intractable  -     rizatriptan (Maxalt) 10 MG tablet; Take 1 tablet by mouth 1 (One) Time As Needed for Migraine for up to 1 dose. May repeat in 2 hours if needed  Dispense: 12 tablet; Refill: 0              Follow Up     Return in about 6 months (around 8/24/2022).    Patient was given instructions and counseling regarding her condition or for health  maintenance advice. Please see specific information pulled into the AVS if appropriate.     Janneth Montes  reports that she has never smoked. She has never used smokeless tobacco..

## 2022-02-28 NOTE — TELEPHONE ENCOUNTER
PATIENT CALLED AND STATES SHE IS HAVE INCREASING DYSHASIA SINCE SURGERY.  SHE HAS CALLED HER PCP AND HAS AN APPOINTMENT WITH AN ENT BUT NOT UNTIL THE LAST OF THE MONTH.  I ADVISED HER TO CALL PCP AND SEE IF APPOINTMENT CAN BE MOVED UP OR WHAT THEY WOULD ADVISE.

## 2022-02-28 NOTE — TELEPHONE ENCOUNTER
Caller: Janneth Montes    Relationship to patient: Self    Best call back number: [phone]     Patient is needing: THE PATIENT HAS CALLED REQUESTING PHENERGAN.  PATIENT STATED EACH MORNING SHE WAKES UP, SHE BECOMES SICK/VOMITING.  PLEASE CALL AND ADVISE, THANK YOU.     [id number] - [place], [place] - [address] AT [facility] - [phone]  - [phone]   [phone]

## 2022-02-28 NOTE — TELEPHONE ENCOUNTER
Pt was referred to [facility] back in June, pt stated that office would not take her on as a patient. Pt has not reached out to [facility]. Advised to contact office to verify they will see her before referral will be placed.

## 2022-02-28 NOTE — TELEPHONE ENCOUNTER
"    Caller: Janneth Montes    Relationship: Self    Best call back number: [phone]    What specialty or service is being requested: PAIN MANAGEMENT     What is the provider, practice or medical service name: PATIENT STATED \"DR. HYMAN\"     What is the office location: [facility]         "

## 2022-02-28 NOTE — TELEPHONE ENCOUNTER
Pt states haven't been able to keep any food down for 9 days. Advised pt of Juli's recommendations. Pt voiced understanding.

## 2022-03-01 NOTE — PROGRESS NOTES
"Chief Complaint  Pain of the Left Hip    Subjective          Janneth Montes presents to [facility] for follow-up on left hip status post left LORI by Dr. Trejo 2/5/2022 following a femoral neck fracture.  Patient here today stating that she fell last night after losing her balance and hit the hip.  She states left hip pain worsened following the fall.  She is requesting a refill of narcotic pain medication.  States she is taking ibuprofen 3-4 times a day without relief.  She was doing home health PT but she was discharged from this.  She is walking with a walker today, prior to falling recently she was using a cane.  She states she did not think she needed any more physical therapy.    Objective   Allergies   Allergen Reactions   • Ciprofloxacin Hives   • Sulfate Hives   • Cephalexin Rash   • Metronidazole Rash   • Sulfamethoxazole-Trimethoprim Rash       Vital Signs:   Pulse (!) 121   Ht 172.7 cm (68\")   Wt 69.4 kg (153 lb)   SpO2 97%   BMI 23.26 kg/m²       Physical Exam  Constitutional:       Appearance: Normal appearance. Patient is well-developed and normal weight.   HENT:      Head: Normocephalic.      Right Ear: Hearing and external ear normal.      Left Ear: Hearing and external ear normal.      Nose: Nose normal.   Eyes:      Conjunctiva/sclera: Conjunctivae normal.   Cardiovascular:      Rate and Rhythm: Normal rate.   Pulmonary:      Effort: Pulmonary effort is normal.      Breath sounds: No wheezing or rales.   Abdominal:      Palpations: Abdomen is soft.      Tenderness: There is no abdominal tenderness.   Musculoskeletal:      Cervical back: Normal range of motion.   Skin:     Findings: No rash.   Neurological:      Mental Status: Patient is alert and oriented to person, place, and time.   Psychiatric:         Mood and Affect: Mood and affect normal.         Judgment: Judgment normal.     Ortho Exam   Left hip: Well-healing surgical incision without erythema " dehiscence or purulent drainage, moderate tenderness and swelling around the wound, good flexion and abduction, minimal pain with internal and external rotation, gait antalgic, good range of motion left knee ankle digits.  Sensation intact in posterior tib pulses 2+  Result Review :            Imaging Results (Most Recent)     Procedure Component Value Units Date/Time    XR Hip With or Without Pelvis 2 - 3 View Left [[id number]] Resulted: 03/01/22 1101     Updated: 03/01/22 1102    Narrative:      X-Ray Report:  Study: X-rays ordered, taken in the office, and reviewed today  Site: Left hip xray  Indication: Pain  View: AP and Lateral view(s)  Findings: Hardware intact, no acute osseous abnormalities, fracture,   malalignment or dislocation  Prior studies available for comparison: yes                   Assessment and Plan    Problem List Items Addressed This Visit        Musculoskeletal and Injuries    Aftercare following surgery of left total hip arthroplasty - Primary    Current Assessment & Plan     Patient here today because she lost her balance and fell and hit the hip.  X-rays taken and reviewed.  She has completed home health physical therapy, discussed importance of outpatient PT at this time.  Order for PT provided today.  Continue home exercises.  Norco refilled today per patient's request.  Follow-up in 2 weeks with no x-ray at that time.  Rest ice elevate.  Avoid falls and injury.         Relevant Orders    Ambulatory Referral to Physical Therapy Evaluate and treat; ROM, Stretching      Other Visit Diagnoses     Left hip pain        Relevant Orders    XR Hip With or Without Pelvis 2 - 3 View Left (Completed)          Follow Up   Return in about 2 weeks (around 3/15/2022) for Recheck.  Patient Instructions   Patient here today because she lost her balance and fell and hit the hip.  X-rays taken and reviewed.  She has completed home health physical therapy, discussed importance of outpatient PT at this time.   Order for PT provided today.  Continue home exercises.  Norco refilled today per patient's request.  Follow-up in 2 weeks with no x-ray at that time.  Rest ice elevate.  Avoid falls and injury.    Patient was given instructions and counseling regarding her condition or for health maintenance advice. Please see specific information pulled into the AVS if appropriate.

## 2022-03-01 NOTE — PATIENT INSTRUCTIONS
Patient here today because she lost her balance and fell and hit the hip.  X-rays taken and reviewed.  She has completed home health physical therapy, discussed importance of outpatient PT at this time.  Order for PT provided today.  Continue home exercises.  Norco refilled today per patient's request.  Follow-up in 2 weeks with no x-ray at that time.  Rest ice elevate.  Avoid falls and injury.

## 2022-03-03 NOTE — OUTREACH NOTE
Total Joint Month 1 Survey      Responses   [facility] facility patient discharged from? [facility]   Does the patient have one of the following disease processes/diagnoses(primary or secondary)? Total Joint Replacement   Joint surgery performed? Hip   Month 1 attempt successful? No   Unsuccessful attempts Attempt 1          Gloria Watkins LPN

## 2022-03-04 NOTE — OUTREACH NOTE
Total Joint Month 1 Survey      Responses   [facility] patient discharged from? [facility]   Does the patient have one of the following disease processes/diagnoses(primary or secondary)? Total Joint Replacement   Joint surgery performed? Hip   Month 1 attempt successful? No   Unsuccessful attempts Attempt 2          Katheryn Humphries RN

## 2022-03-30 NOTE — TELEPHONE ENCOUNTER
Pt called stating having trouble breathing for a couple of days. Pt stated doesn't have an inhaler and is SOB while moving around the house. Pt is wanting a referral for pulmonology. Per Juli to report to ER for eval and treat. Then can set up referral if still needed. Pt voiced understanding.

## 2022-04-06 NOTE — OUTREACH NOTE
Total Joint Month 2 Survey    Flowsheet Row Responses   [facility] patient discharged from? [facility]   Does the patient have one of the following disease processes/diagnoses(primary or secondary)? Total Joint Replacement   Joint surgery performed? Hip   Month 2 attempt successful? Yes   Call start time 1142   Call end time 1147   Has the patient been back in either the hospital or Emergency Department since discharge? No   Discharge diagnosis  left total hip arthroplasty    Is patient permission given to speak with other caregiver? No   Is the patient taking all medications as directed (includes completed medication regime)? Yes   Has the patient kept scheduled appointments due by today? Yes  [patient has been seen once in ortho office since surgery, no other appts. ]   Comments Advised for patient to scheduled PCP and ortho f/u. At last ortho appt, it was advised for her to return in 4 weeks.    Is the patient still receiving Home Health Services? N/A   Is the patient still attending therapy sessions(either in the home or as an outpatient)? No   Has the patient fallen since discharge? No   What is the patient's perception of their functional status since discharge? Improving   Is the patient able to teach back signs and symptoms of infection? --  [Reports healing well. ]   If the patient is a current smoker, are they able to teach back resources for cessation? Not a smoker   Is the patient/caregiver able to teach back the hierarchy of who to call/visit for symptoms/problems? PCP, Specialist, Home health nurse, Urgent Care, ED, 911 Yes   Month 2 Call Completed? Yes   Wrap up additional comments Denies any questions or concerns this week.           RANDY GARCIA - Registered Nurse

## 2022-05-06 NOTE — OUTREACH NOTE
Total Joint Month 3 Survey    Flowsheet Row Responses   [facility] patient discharged from? [facility]   Does the patient have one of the following disease processes/diagnoses(primary or secondary)? Total Joint Replacement   Joint surgery performed? Hip   Month 3 attempt successful? Yes   Call start time 1626   Revoke Readmitted  [Patient currently admitted to [facility] with pneumonia. ]   Call end time 1629   Has the patient been back in either the hospital or Emergency Department since discharge? Yes   If the patient has been back to hospital or Emergency Department list date and reason Patient reports that she has been at [facility] for 18days so far. Current admit   Person spoke with today (if not patient) and relationship patient          RANDY JOSE - Registered Nurse

## 2022-05-13 PROBLEM — J18.9 MULTIFOCAL PNEUMONIA: Status: ACTIVE | Noted: 2022-01-01

## 2022-05-13 PROBLEM — E44.0 MODERATE MALNUTRITION: Status: ACTIVE | Noted: 2022-01-01

## 2022-06-27 NOTE — TELEPHONE ENCOUNTER
Caller: Janneth Montes    Relationship to patient: Self    Best call back number: [phone]    New or established patient?  [] New  [x] Established    Date of discharge: 06/27/22    Facility discharged from: [facility]    Diagnosis/Symptoms: RESPIRATORY FAILURE

## 2022-06-27 NOTE — OUTREACH NOTE
Prep Survey    Flowsheet Row Responses   Rest[facility] facility patient discharged from? Non-BH   Is LACE score < 7 ? Non-BH Discharge   Emergency Room discharge w/ pulse ox? No   Eligibility [facility]   Date of Discharge 06/27/22   Discharge diagnosis RESPIRATORY FAILURE   Does the patient have one of the following disease processes/diagnoses(primary or secondary)? Other   Prep survey completed? Yes          LEWIS MARRERO - Registered Nurse

## 2022-06-27 NOTE — TELEPHONE ENCOUNTER
Caller: THELMA MARRERO/ALEKSANDRA    Relationship: Home Health    Best call back number: [phone]    What orders are you requesting (i.e. lab or imaging): NURSING, OT AND PT    In what timeframe would the patient need to come in: ASAP    Where will you receive your lab/imaging services:     Additional notes: WOULD LIKE TO SEE PATIENT IN THE NEXT DAY OR TWO

## 2022-06-28 NOTE — OUTREACH NOTE
Call Center TCM Note    Flowsheet Row Responses   [facility] patient discharged from? Non-  [[facility]]   Does the patient have one of the following disease processes/diagnoses(primary or secondary)? Other   TCM attempt successful? Yes  [verbal release ]   Call start time [phone]   Call end time [phone]   Discharge diagnosis RESPIRATORY FAILURE   Person spoke with today (if not patient) and relationship Patient and Martin, spouse   Meds reviewed with patient/caregiver? Yes   Is the patient having any side effects they believe may be caused by any medication additions or changes? No   Does the patient have all medications ordered at discharge? Yes   Prescription comments Patient questioning the addition of Tramadol for pain control--received while in rehab   Is the patient taking all medications as directed (includes completed medication regime)? Yes   Medication comments  reports he received new prescriptions for Olanzaprine, Furosemide, Spirolactone, Eliquis, Pantoprazole, Metoprolol-Tartrate    Does the patient have an appointment with their PCP within 7 days of discharge? Yes   Comments regarding PCP Hospital PCP FOLLOW UP APPOINTMENT IS 22@0900am   Has the patient kept scheduled appointments due by today? N/A   What is the Home health agency?  [facility]   Has home health visited the patient within 72 hours of discharge? Call prior to 72 hours   Home health interventions Called Home Health agency   Home health comments This RN called  who communicated plans to visit today and also requested assistance with providing patient with copy of her discharge instructions   Has all DME been delivered? Yes   DME comments Patient is using a lift/wheelchair.  There is O2 in the home but she is not using--sats 98% on room air at time of discharge according to    Psychosocial issues? No   Did the patient receive a copy of their discharge instructions? No  [This RN called  to  request assistance with providing patient with a copy of records]   What is the patient's perception of their health status since discharge? Improving  [Patient denies any ongoing issues with respiratoy issues, no edema noted.  Only complaint offered is due to weakness and immobilty--she cannot walk, stand/pivot using lift. Hopeful that PT will  be beneficial. ]   Is the patient/caregiver able to teach back the hierarchy of who to call/visit for symptoms/problems? PCP, Specialist, Home health nurse, Urgent Care, ED, 911 Yes   TCM call completed? Yes          Antoinette Martinez RN    6/28/2022, 09:49 EDT

## 2022-06-29 NOTE — TELEPHONE ENCOUNTER
Vijay from [facility] called stating pt was barely responsive. Home Health states pt was groaning, pupils are dilated with sluggish response.  HH tried a hard sternum rub with no response. HH contacted office to make PCP aware and suggest pt return to hospital. R/C call to Vijay to call 911 to have pt go back to hospital.

## 2022-06-30 PROBLEM — A41.9 SEPSIS, DUE TO UNSPECIFIED ORGANISM, UNSPECIFIED WHETHER ACUTE ORGAN DYSFUNCTION PRESENT (HCC): Status: ACTIVE | Noted: 2022-01-01

## 2022-07-01 NOTE — DISCHARGE SUMMARY
[facility]         HOSPITALIST  DISCHARGE SUMMARY    Patient Name: Janneth Montes  : 1965  MRN: [id number]    Date of Admission: 2022  Date of Discharge:  2022  Primary Care Physician: Juli Cam APRN    Consults     Date and Time Order Name Status Description    2022 12:09 AM Hospitalist (on-call MD unless specified)      2022 12:22 PM IP General Consult (Use specialty-specific consult if known)      2022 11:52 AM Hospitalist (on-call MD unless specified)            Active and Resolved Hospital Problems:  Hepatic encephalopathy  Cirrhosis secondary to hepatitis C  HIV/AIDS  Failure to thrive  Diabetes  History of esophageal varices  CMV  Diastolic dysfunction  Acute kidney injury      Hospital Course     Hospital Course:  Janneth Montes is a 57 y.o. female with a past medical history of AIDS, HIV, esophageal varices presents to the emergency room with altered mental status.  Patient came from home and she has been altered for the past 12 hours.  She is only responsive to painful stimuli.  On arrival she had a blood sugar 148, was hypotensive and was not given any therapeutics.  Patient with a history of HIV and multifocal pneumonia.  Patient with 2 admissions to [facility] for around 1 month each time.  Patient was discharged only out of the hospital short while before that second admission for around a month.  Patient had only just returned home from being at [facility] for ongoing therapy.  There is concern patient does not take her medications as indicated at home as well as continued use of drugs.  Patient's labs most significant for an ammonia level of 223.  An NG tube was placed and patient started on aggressive lactulose.  Patient's mentation improved.  Original plans were to transfer to [facility] given her infectious disease team being present there.  However, patient and family met with palliative care and later  hospice ultimately decided to bring patient home under the guidance of hospice.  Patient was discharged on 7/1/2022 after meeting with hospice along with her family.  Patient will be discharging with as needed Ativan and morphine sublingual.  Patient will need to continue her lactulose given her quick propensity to become encephalopathic once this medication is discontinued.  Patient seen on date of discharge, clinically and hemodynamically stable.  Patient and family worked with palliative and hospice care, plans for no further readmissions.      DISCHARGE Follow Up Recommendations for labs and diagnostics:   Follow-up with hospice care      Day of Discharge     Vital Signs:  Temp:  [97.3 °F (36.3 °C)-98.7 °F (37.1 °C)] 97.3 °F (36.3 °C)  Heart Rate:  [55-76] 63  Resp:  [16-19] 18  BP: ()/(57-75) 136/57  Physical Exam:               Constitutional: Chronically ill-appearing, awake alert answering questions appropriately, good insight              Eyes: Pupils equal, sclerae anicteric, no conjunctival injection              HENT: NCAT, mucous membranes moist; has NG in place              Neck: Supple, no thyromegaly, no lymphadenopathy, trachea midline              Respiratory: Clear to auscultation bilaterally, nonlabored respirations               Cardiovascular: Regular rate and rhythm              Gastrointestinal: Positive bowel sounds, soft, nontender, nondistended              Musculoskeletal: No bilateral ankle edema, no clubbing or cyanosis to extremities              Neurologic: Oriented x 3, strength symmetric in all extremities, Cranial Nerves grossly intact to confrontation, speech clear      Discharge Details        Discharge Medications      New Medications      Instructions Start Date   LORazepam 1 mg/mL in Ora-Plus-ora sweet-water   1 mg, Oral, Every 6 Hours PRN      morphine 20 MG/ML concentrated solution   10 mg, Oral, Every 2 Hours PRN         Continue These Medications      Instructions  Start Date   Bictegravir-Emtricitab-Tenofov -25 MG per tablet  Commonly known as: BIKTARVY   1 tablet, Oral, Daily      lactulose 10 GM/15ML solution  Commonly known as: CHRONULAC   20 g, Oral, 3 Times Daily PRN      metoprolol tartrate 50 MG tablet  Commonly known as: LOPRESSOR   50 mg, Oral, Every 8 Hours      valGANciclovir 450 MG tablet  Commonly known as: VALCYTE   900 mg, Oral, Daily         Stop These Medications    Eliquis 2.5 MG tablet tablet  Generic drug: apixaban     furosemide 40 MG tablet  Commonly known as: LASIX     OLANZapine 2.5 MG tablet  Commonly known as: zyPREXA     ondansetron 4 MG tablet  Commonly known as: ZOFRAN     pantoprazole 40 MG EC tablet  Commonly known as: PROTONIX     spironolactone 50 MG tablet  Commonly known as: ALDACTONE            Allergies   Allergen Reactions   • Ciprofloxacin Hives   • Sulfate Hives   • Cephalexin Rash   • Metronidazole Rash   • Sulfamethoxazole-Trimethoprim Rash       Discharge Disposition:  Hospice/Home    Diet:  Hospital:No active diet order      Discharge Activity:       CODE STATUS:  Code Status and Medical Interventions:   Ordered at: 06/30/22 1554     Medical Intervention Limits:    NO intubation (DNI)     Code Status (Patient has no pulse and is not breathing):    No CPR (Do Not Attempt to Resuscitate)     Medical Interventions (Patient has pulse or is breathing):    Limited Support         Future Appointments   Date Time Provider Department Center   8/24/2022  9:00 AM Juli Cam APRN [facility]   2/28/2023 10:00 AM [facility] Cam [facility]   2/28/2023 10:15 AM Juli Cam APRN [facility]       Additional Instructions for the Follow-ups that You Need to Schedule     Discharge Follow-up with PCP   As directed       Currently Documented PCP:    Juli Cam APRN    PCP Phone Number:    [phone]     Follow Up Details: In less than one week               Pertinent  and/or Most Recent Results     PROCEDURES:        LAB RESULTS:      Lab 07/01/22  0730 06/30/22  0808 06/30/22  0316 06/29/22  1658 06/29/22  1344 06/29/22  1034 06/29/22  1016   WBC 5.58 7.01  --   --   --   --  6.71   HEMOGLOBIN 10.9* 11.0*  --   --   --   --  13.4   HEMATOCRIT 33.2* 32.6*  --   --   --   --  38.9   PLATELETS 216 200  --   --   --   --  221   NEUTROS ABS 2.63 4.20  --   --   --   --  3.95   IMMATURE GRANS (ABS) 0.07* 0.14*  --   --   --   --  0.06*   LYMPHS ABS 1.70 1.26  --   --   --   --  1.77   MONOS ABS 1.10* 1.34*  --   --   --   --  0.89   EOS ABS 0.02 0.01  --   --   --   --  0.01   .7* 108.3*  --   --   --   --  105.4*   PROCALCITONIN  --   --   --   --   --   --  1.32*   LACTATE  --   --   --  1.9 2.2*  --  2.5*   LACTATE, ARTERIAL  --   --   --   --   --  4.44*  --    PROTIME 17.5*  --  15.1*  --   --   --  14.6         Lab 07/01/22  0730 06/30/22  0316 06/29/22  1034 06/29/22  1016   SODIUM 144 143  --  136   SODIUM, ARTERIAL  --   --  134.3*  --    POTASSIUM 3.4* 4.2  --  4.6   CHLORIDE 115* 111*  --  101   CO2 18.5* 14.3*  --  22.8   ANION GAP 10.5 17.7*  --  12.2   BUN 29* 37*  --  40*   CREATININE 0.85 1.15*  --  1.11*   EGFR 80.5 56.0*  --  58.5*   GLUCOSE 106* 147*  --  126*   GLUCOSE, ARTERIAL  --   --  139*  --    CALCIUM 8.9 9.6  --  10.5   IONIZED CALCIUM  --   --  1.18  --    MAGNESIUM 2.3 1.8  --  1.8   PHOSPHORUS 3.4 3.7  --   --    TSH  --   --   --  1.130         Lab 07/01/22  0730 06/30/22  0316 06/29/22  1016   TOTAL PROTEIN 6.1 6.9 7.4   ALBUMIN 2.30* 2.40* 2.70*   GLOBULIN  --   --  4.7   ALT (SGPT) 36* 40* 44*   AST (SGOT) 42* 48* 45*   BILIRUBIN 1.3* 1.3* 1.4*   INDIRECT BILIRUBIN 0.8 0.8  --    BILIRUBIN DIRECT 0.5* 0.5*  --    ALK PHOS 111 136* 162*         Lab 07/01/22  0730 06/30/22  0316 06/29/22  1016   PROBNP  --   --  109.9   PROTIME 17.5* 15.1* 14.6   INR 1.41* 1.17* 1.13                 Lab 06/29/22  1034   PH, ARTERIAL 7.605*   PCO2, ARTERIAL 21.3*   PO2 ART 91.5   O2 SATURATION ART 97.5    FIO2 21   HCO3 ART 20.7*   BASE EXCESS ART 1.2   CARBOXYHEMOGLOBIN 0.7     Brief Urine Lab Results  (Last result in the past 365 days)      Color   Clarity   Blood   Leuk Est   Nitrite   Protein   CREAT   Urine HCG        06/29/22 1021 Yellow   Cloudy   Negative   Small (1+)   Negative   Negative               Microbiology Results (last 10 days)     Procedure Component Value - Date/Time    Urine Culture - Urine, Urine, Catheter [[id number]]  (Abnormal)  (Susceptibility) Collected: 06/29/22 1021    Lab Status: Final result Specimen: Urine, Catheter Updated: 07/01/22 1031     Urine Culture >100,000 CFU/mL Escherichia coli    Narrative:      Colonization of the urinary tract without infection is common. Treatment is discouraged unless the patient is symptomatic, pregnant, or undergoing an invasive urologic procedure.    Susceptibility      Escherichia coli      JULIETA      Ampicillin Intermediate      Ampicillin + Sulbactam Susceptible      Cefazolin Susceptible      Cefepime Susceptible      Ceftazidime Susceptible      Ceftriaxone Susceptible      Gentamicin Susceptible      Levofloxacin Susceptible      Nitrofurantoin Susceptible      Piperacillin + Tazobactam Susceptible      Trimethoprim + Sulfamethoxazole Susceptible                           Blood Culture - Blood, Arm, Left [[id number]]  (Normal) Collected: 06/29/22 1016    Lab Status: Preliminary result Specimen: Blood from Arm, Left Updated: 07/01/22 1032     Blood Culture No growth at 2 days    Blood Culture - Blood, Arm, Left [[id number]]  (Normal) Collected: 06/29/22 1016    Lab Status: Preliminary result Specimen: Blood from Arm, Left Updated: 07/01/22 1032     Blood Culture No growth at 2 days          CT Head Without Contrast    Result Date: 6/29/2022  Impression:   1. Mild small vessel ischemic changes in the white matter 2. No specific CT evidence of acute infarction or intracranial hemorrhage     Wilian Christine M.D.       Electronically Signed and Approved  By: Wilian Christine M.D. on 6/29/2022 at 10:44             XR Chest 1 View    Result Date: 6/29/2022  Impression:   1. No acute cardiopulmonary disease       Wilian Christine M.D.       Electronically Signed and Approved By: Wilian Christine M.D. on 6/29/2022 at 10:47                       Results for orders placed during the hospital encounter of 05/12/22    Adult Transthoracic Echo Complete W/ Cont if Necessary Per Protocol    Interpretation Summary  · Left ventricular ejection fraction appears to be 51 - 55%. Left ventricular systolic function is low normal.  · Left ventricular wall thickness is consistent with borderline concentric hypertrophy.  · There is diastolic dysfunction of the left ventricle.  · There are no significant valvular abnormalities.  · Estimated right ventricular systolic pressure from tricuspid regurgitation is normal (<35 mmHg).      Labs Pending at Discharge:  Pending Labs     Order Current Status    CMV DNA, Quantitative, PCR In process    Blood Culture - Blood, Arm, Left Preliminary result    Blood Culture - Blood, Arm, Left Preliminary result            Time spent on Discharge including face to face service:  39 minutes    Electronically signed by Raymond Hilton MD, 07/01/22, 11:05 AM EDT.

## 2022-07-01 NOTE — PROGRESS NOTES
"Pharmacy to Dose Vancomycin Day: 2    Janneth Montes is a 57 y.o.female admitted with altered mental status. Pharmacy has been consulted to dose IV Vancomycin     Consulting Provider: Dr. Sandoval  Clinical Indication: Sepsis  Pertinent Past Medical History:   HIV  Recent hospital admission May 2022- Pneumocystis carinii pneumonia and CMV viremia   Esophageal varices    Goal -600 mg/L.hr  Duration of therapy: 5 days     162.6 cm (64\")       06/30/22  0215      Weight: 59.3 kg (130 lb 11.7 oz)         Estimated Creatinine Clearance: 69.2 mL/min (by C-G formula based on SCr of 0.85 mg/dL).  Results from last 7 days   Lab Units 07/01/22  0730 06/30/22  0316 06/29/22  1016   BUN mg/dL 29* 37* 40*   CREATININE mg/dL 0.85 1.15* 1.11*       HD/PD/CRRT?: No    Lab Results   Component Value Date    WBC 5.58 07/01/2022      Temperature    06/30/22 2322 07/01/22 0310 07/01/22 0743   Temp: 97.9 °F (36.6 °C) 98.2 °F (36.8 °C) 97.3 °F (36.3 °C)        Contrast Administered: No    Relevant Micro:   Microbiology Results (last 10 days)       Procedure Component Value - Date/Time    Urine Culture - Urine, Urine, Catheter [[id number]]  (Abnormal)  (Susceptibility) Collected: 06/29/22 1021    Lab Status: Final result Specimen: Urine, Catheter Updated: 07/01/22 1031     Urine Culture >100,000 CFU/mL Escherichia coli    Narrative:      Colonization of the urinary tract without infection is common. Treatment is discouraged unless the patient is symptomatic, pregnant, or undergoing an invasive urologic procedure.    Susceptibility        Escherichia coli      JULIETA      Ampicillin Intermediate      Ampicillin + Sulbactam Susceptible      Cefazolin Susceptible      Cefepime Susceptible      Ceftazidime Susceptible      Ceftriaxone Susceptible      Gentamicin Susceptible      Levofloxacin Susceptible      Nitrofurantoin Susceptible      Piperacillin + Tazobactam Susceptible      Trimethoprim + Sulfamethoxazole Susceptible             "               Blood Culture - Blood, Arm, Left [[id number]]  (Normal) Collected: 06/29/22 1016    Lab Status: Preliminary result Specimen: Blood from Arm, Left Updated: 07/01/22 1032     Blood Culture No growth at 2 days    Blood Culture - Blood, Arm, Left [[id number]]  (Normal) Collected: 06/29/22 1016    Lab Status: Preliminary result Specimen: Blood from Arm, Left Updated: 07/01/22 1032     Blood Culture No growth at 2 days             Relevant Radiology:   Chest Xray: No acute cardiopulmonary disease       Other Antimicrobial Therapy: Merrem    Assessment/Plan  Regimen: Vancomycin 1250 mg every 24 hours  AUC24,ss: 623 mg/L.hr  PAUC*: 100 %  Ctrough,ss: 17.1 mg/L  Pconc*: 27 %  Tox.: 13 %    The current regimen of vancomycin is predicting a AUC > 600. Will change vancomycin to 1000mg IV q24h to provide the following per [facility]:   Regimen: 1000 mg IV every 24 hours.  AUC24,ss: 504 mg/L.hr  PAUC*: 91 %  Ctrough,ss: 13.8 mg/L  Pconc*: 8 %  Tox.: 9 %    Level due: none at this time  Labs ordered: BMP x 3 days     Patient to be transferred to [facility] when bed available per Dr. Sandoval

## 2022-07-01 NOTE — CONSULTS
"Nutrition Services    Patient Name: Janneth Montes  YOB: 1965  MRN: [id number]  Admission date: 6/29/2022      Clinical Nutrition Assessment      Reason for Assessment: Physician consult, EN       Clinical Course/Narrative: Enteral nutrition started yesterday. Last documented as infusion at 50 mL/hr, goal rate of 62, tolerating well. Pt is more awake today, asking for Mo's. SLP eval to happen today. Pt noted to have Stage III PI to coccyx. Hospice referral per palliative note. Continue current nutrition prescription at this time.             Past Medical History:   Diagnosis Date   • Allergic rhinitis due to allergen    • Arthritis    • Avascular necrosis (HCC) 09/29/2015   • Cervical cancer (HCC)    • Claustrophobia    • Diabetes mellitus, type 2 (HCC)    • Epilepsy (HCC)    • Essential hypertension    • Hepatitis C    • HIV positive (HCC)    • Migraines    • Neck abscess    • Neck mass    • Ovary cancer (HCC)    • Retained ureteral stent 08/10/2020   • Skin disease    • STD (female)    • Ureteral calculus, left 08/10/2020       Past Surgical History:   Procedure Laterality Date   • ANKLE ARTHROSCOPY W/ OPEN REPAIR  1989   • APPENDECTOMY  1998   • BACK SURGERY      X4   • COLONOSCOPY  05/22/2013   • HYSTERECTOMY  1995   • LIVER BIOPSY  2014   • OTHER SURGICAL HISTORY      METAL PLATES; SPINE DDD   • TOTAL HIP ARTHROPLASTY Left 2/5/2022    Procedure: TOTAL HIP ARTHROPLASTY;  Surgeon: Jarad Trejo MD;  Location: [facility] OR;  Service: Orthopedics;  Laterality: Left;          Nutrition Intervention:        PO Diet/Supplements: No diet orders on file   No active supplement orders         Intake/Output: No intake or output data in the 24 hours ending 07/01/22 1036         Height: Height: 162.6 cm (64\")   Weight: Weight: 59.3 kg (130 lb 11.7 oz) (06/30/22 0215)   BMI: Body mass index is 22.44 kg/m².     Estimated/Assessed Needs       Energy Requirements    EST Needs (kcal/day) 1779     "   Protein Requirements    EST Daily Needs (g/day) 71-89       Fluid Requirements     Estimated Needs (mL/day) 1779     Results from last 7 days   Lab Units 07/01/22  0730 06/30/22  0316 06/29/22  1034 06/29/22  1016   SODIUM mmol/L 144 143  --  136   SODIUM, ARTERIAL mmol/L  --   --  134.3*  --    POTASSIUM mmol/L 3.4* 4.2  --  4.6   CHLORIDE mmol/L 115* 111*  --  101   CO2 mmol/L 18.5* 14.3*  --  22.8   BUN mg/dL 29* 37*  --  40*   CREATININE mg/dL 0.85 1.15*  --  1.11*   CALCIUM mg/dL 8.9 9.6  --  10.5   BILIRUBIN mg/dL 1.3* 1.3*  --  1.4*   ALK PHOS U/L 111 136*  --  162*   ALT (SGPT) U/L 36* 40*  --  44*   AST (SGOT) U/L 42* 48*  --  45*   GLUCOSE mg/dL 106* 147*  --  126*   GLUCOSE, ARTERIAL mmol/L  --   --  139*  --      Results from last 7 days   Lab Units 07/01/22  0730 06/30/22  0808 06/30/22  0316 06/29/22  1016   0000   MAGNESIUM mg/dL 2.3  --  1.8 1.8  --    PHOSPHORUS mg/dL 3.4  --  3.7  --    < >   HEMOGLOBIN g/dL 10.9*   < >  --  13.4  --    HEMATOCRIT % 33.2*   < >  --  38.9  --     < > = values in this interval not displayed.     Lab Results   Component Value Date    HGBA1C 4.70 (L) 02/06/2022       Malnutrition Severity Assessment                Medical Nutrition Therapy/Nutrition Education          Learner     Readiness Patient  Education not indicated at this time     Method     Response Other  Other     Nutrition Diagnosis         Nutrition Dx Problem 1 Inadequate energy Intake related to decreased ability to consume sufficient energy as evidenced by NPO, AMS.        Nutrition Intervention         Diabetisource AC @ 62 ml/hr  Free water flushes 70 ml every 3 hours  Provides 1786 kcal, 89 g pro, 1780 ml fluid      Monitor/Evaluation        Monitor Per protocol, Pertinent labs, EN delivery/tolerance, Weight, POC/GOC, Diet advancement     Nutrition Discharge Plan         To be determined       RD to follow up per protocol.    Electronically signed by:  DEANA PELAEZ RD  07/01/22 10:36 EDT

## 2022-07-01 NOTE — NURSING NOTE
[facility].  Palliative care nurse was updated by primary RN Yaneth regarding patient's current condition.  Patient is more alert and talking today.     At 900 am, visited with patient, introduced self and explained my role.  Patient has intermittent confusion.  She is complaining of pain to her nose and is asking for her ng tube to be removed.  She also asked for something to drink.  She was tearful today and asked me to stay with her until her spouse arrived.  Spouse and patient had a [facility] visit scheduled today at 930 am.  Dr. Hilton visited while this nurse was present to discuss her goals of care.  She continues to ask to go home today. Dr. Hilton was going to have speech evaluate her again and advance her diet accordingly.     Palliative care nurse called her spouse to discuss her wishes, he arrived at the hospital and we discussed her wishes, goals of care and treatment options.  He wants to take her home today with hospice.  He signed an EMS DNR and the original was placed on her chart and a copy sent to medical records. Encouraged him to meet with hospice before making that decision to hear about their services.  Patient and family voiced they understand hospice is end of life care and they will manage her care and symptoms at home.    After their visit with hospice, I followed up with spouse and he requested to take her home today with hospice.  Hospice assured they could admit the patient today.  No DME needs at this time. Informed Dr. Hilton and discharge orders were entered,  Medications were escribed to [facility] per family wishes.     Educated RN on discharge process with [facility].  Plans are to go home with EMS.

## 2022-07-01 NOTE — PLAN OF CARE
Problem: Fall Injury Risk  Goal: Absence of Fall and Fall-Related Injury  Outcome: Ongoing, Progressing  Intervention: Identify and Manage Contributors  Recent Flowsheet Documentation  Taken 6/30/2022 2000 by Kortney Crawford RN  Medication Review/Management: medications reviewed  Intervention: Promote Injury-Free Environment  Recent Flowsheet Documentation  Taken 7/1/2022 0400 by Kortney Crawford RN  Safety Promotion/Fall Prevention: safety round/check completed  Taken 7/1/2022 0000 by Kortney Crawford RN  Safety Promotion/Fall Prevention: safety round/check completed  Taken 6/30/2022 2200 by Kortney Crawford RN  Safety Promotion/Fall Prevention: safety round/check completed  Taken 6/30/2022 2000 by Kortney Crawford RN  Safety Promotion/Fall Prevention: safety round/check completed     Problem: Adult Inpatient Plan of Care  Goal: Plan of Care Review  Outcome: Ongoing, Progressing  Flowsheets (Taken 7/1/2022 0522)  Plan of Care Reviewed With: patient  Outcome Evaluation: pt is more alert as the shift went on, yelling out and asking for [employer] and wanting to see her doctor, pt is still confused but able to tell me her name, vss, gave prn pain meds per pt request, started pt tube feeds and tolerating well, will continue to monitor.  Goal: Patient-Specific Goal (Individualized)  Outcome: Ongoing, Progressing  Goal: Absence of Hospital-Acquired Illness or Injury  Outcome: Ongoing, Progressing  Intervention: Identify and Manage Fall Risk  Recent Flowsheet Documentation  Taken 7/1/2022 0400 by Kortney Crawford RN  Safety Promotion/Fall Prevention: safety round/check completed  Taken 7/1/2022 0000 by Kortney Crawford RN  Safety Promotion/Fall Prevention: safety round/check completed  Taken 6/30/2022 2200 by Kortney Crawford RN  Safety Promotion/Fall Prevention: safety round/check completed  Taken 6/30/2022 2000 by Kortney Crawford RN  Safety Promotion/Fall Prevention: safety round/check  completed  Intervention: Prevent Skin Injury  Recent Flowsheet Documentation  Taken 6/30/2022 2000 by Kortney Crawford RN  Body Position: weight shifting  Intervention: Prevent and Manage VTE (Venous Thromboembolism) Risk  Recent Flowsheet Documentation  Taken 6/30/2022 2000 by Kortney Crawford RN  Range of Motion: ROM (range of motion) performed  Goal: Optimal Comfort and Wellbeing  Outcome: Ongoing, Progressing  Intervention: Provide Person-Centered Care  Recent Flowsheet Documentation  Taken 6/30/2022 2000 by Kortney Crawford RN  Trust Relationship/Rapport:   care explained   choices provided   emotional support provided   empathic listening provided   questions answered   questions encouraged   reassurance provided   thoughts/feelings acknowledged  Goal: Readiness for Transition of Care  Outcome: Ongoing, Progressing     Problem: UTI (Urinary Tract Infection)  Goal: Improved Infection Symptoms  Outcome: Ongoing, Progressing     Problem: Skin Injury Risk Increased  Goal: Skin Health and Integrity  Outcome: Ongoing, Progressing  Intervention: Optimize Skin Protection  Recent Flowsheet Documentation  Taken 6/30/2022 2000 by Kortney Crawford RN  Head of Bed (HOB) Positioning: HOB elevated     Problem: Diabetes Comorbidity  Goal: Blood Glucose Level Within Targeted Range  Outcome: Ongoing, Progressing     Problem: Hypertension Comorbidity  Goal: Blood Pressure in Desired Range  Outcome: Ongoing, Progressing  Intervention: Maintain Blood Pressure Management  Recent Flowsheet Documentation  Taken 6/30/2022 2000 by Kortney Crawford RN  Medication Review/Management: medications reviewed     Problem: Palliative Care  Goal: Enhanced Quality of Life  Outcome: Ongoing, Progressing  Intervention: Optimize Psychosocial Wellbeing  Recent Flowsheet Documentation  Taken 6/30/2022 2000 by Kortney Crawford RN  Supportive Measures: active listening utilized  Family/Support System Care: support provided   Goal Outcome  Evaluation:  Plan of Care Reviewed With: patient           Outcome Evaluation: pt is more alert as the shift went on, yelling out and asking for [employer] and wanting to see her doctor, pt is still confused but able to tell me her name, vss, gave prn pain meds per pt request, started pt tube feeds and tolerating well, will continue to monitor.

## 2022-07-01 NOTE — SIGNIFICANT NOTE
07/01/22 1030   Coping/Psychosocial   Observed Emotional State calm;cooperative   Verbalized Emotional State hopefulness;other (see comments)  (hungry)   Trust Relationship/Rapport empathic listening provided   Involvement in Care interacting with patient   Additional Documentation Spiritual Care (Group)   Spiritual Care   Use of Spiritual Resources non-Hinduism use of spiritual care   Spiritual Care Source  initiative   Spiritual Care Follow-Up follow-up, none required as presently assessed   Response to Spiritual Care engaged in conversation;receptive of support   Spiritual Care Interventions supportive conversation provided   Spiritual Care Visit Type initial   Receptivity to Spiritual Care visit welcomed

## 2022-07-01 NOTE — THERAPY EVALUATION
Acute Care - Speech Language Pathology   Swallow Initial Evaluation  Estefanía     Patient Name: Janneth Montes  : 1965  MRN: [id number]  Today's Date: 2022               Admit Date: 2022    Visit Dx:     ICD-10-CM ICD-9-CM   1. Sepsis, due to unspecified organism, unspecified whether acute organ dysfunction present (HCC)  A41.9 038.9     995.91   2. Hepatic encephalopathy (HCC)  K72.90 572.2   3. Acute UTI  N39.0 599.0   4. Chronic hepatitis C with hepatic coma (HCC)  B18.2 070.44   5. HIV positive (HCC)  Z21 V08   6. Oropharyngeal dysphagia  R13.12 787.22     Patient Active Problem List   Diagnosis   • Allergic rhinitis due to allergen   • Arthritis   • Diabetes mellitus, type II (HCC)   • Encounter for long-term (current) use of insulin (HCC)   • Epilepsy (HCC)   • Essential hypertension   • Hepatitis C   • HIV positive (HCC)   • Migraines   • Peripheral neuropathic pain   • Skin disease   • Closed fracture of neck of left femur (HCC)   • Hip fracture (HCC)   • Aftercare following surgery of left total hip arthroplasty   • Multifocal pneumonia   • Moderate malnutrition (HCC)   • Sepsis, due to unspecified organism, unspecified whether acute organ dysfunction present (HCC)     Past Medical History:   Diagnosis Date   • Allergic rhinitis due to allergen    • Arthritis    • Avascular necrosis (HCC) 2015   • Cervical cancer (HCC)    • Claustrophobia    • Diabetes mellitus, type 2 (HCC)    • Epilepsy (HCC)    • Essential hypertension    • Hepatitis C    • HIV positive (HCC)    • Migraines    • Neck abscess    • Neck mass    • Ovary cancer (HCC)    • Retained ureteral stent 08/10/2020   • Skin disease    • STD (female)    • Ureteral calculus, left 08/10/2020     Past Surgical History:   Procedure Laterality Date   • ANKLE ARTHROSCOPY W/ OPEN REPAIR     • APPENDECTOMY     • BACK SURGERY      X4   • COLONOSCOPY  2013   • HYSTERECTOMY     • LIVER BIOPSY     • OTHER SURGICAL  HISTORY      METAL PLATES; SPINE DDD   • TOTAL HIP ARTHROPLASTY Left 2/5/2022    Procedure: TOTAL HIP ARTHROPLASTY;  Surgeon: Jarad Trejo MD;  Location: [facility];  Service: Orthopedics;  Laterality: Left;           Inpatient Speech Pathology Dysphagia Evaluation        PAIN SCALE: None indicated.    PRECAUTIONS/CONTRAINDICATIONS: Standard    SUSPECTED ABUSE/NEGLECT/EXPLOITATION: None indicated.    SOCIAL/PSYCHOLOGICAL NEEDS/BARRIERS: None indicated.    PAST SOCIAL HISTORY: 57-year-old female lives at home with her     PRIOR FUNCTION: Not fully determined, previously independent    PATIENT GOALS/EXPECTATIONS: Continue eating orally, wanting [id number]-[id number]    HISTORY: 57-year-old female the above diagnosis referred for speech therapy evaluation to assess for swallowing.  Patient with altered mental status on admission, require alternative feeding with NG.  NG recently removed.  No previous speech therapy reported.  Note plan for patient to return home with hospice.    CURRENT DIET LEVEL: N.p.o.    OBJECTIVE:    TEST ADMINISTERED: Clinical dysphagia evaluation    COGNITION/SAFETY AWARENESS: Patient followed directions and answered questions without difficulty    BEHAVIORAL OBSERVATIONS: Alert and cooperative    ORAL MOTOR EXAM: Lingual tremor noted, decreased general oral motor strength/range of motion 3+/5.  Patient is edentulous and does not wear dentures.    VOICE QUALITY: Soft but adequate    REFLEX EXAM: Patient demonstrated cough    POSTURE: Assisted sitting upright in bed    FEEDING/SWALLOWING FUNCTION: Assessed with  thin liquids, puréed solids, crunchy solid.    CLINICAL OBSERVATIONS: Thin liquid  by straw appeared timely with vocal quality remaining clear to cervical auscultation.  Patient taking multiple sips of soft drink by straw with no difficulty noted.  Delayed cough noted x1 with straw drink of water.  Purée solid with swallow completed with laryngeal elevation noted to palpation.   Crunchy solid with extended chewing followed by swallow completed clearing the oral cavity.  Patient was requiring assistance for hand to mouth.    DYSPHAGIA CRITERIA: Swallow appears grossly functional for soft whole solids.      FUNCTIONAL ASSESSMENT INSTRUMENT: Patient currently scored a level 7 of 7 on Functional Communication Measures for swallowing indicating a 0% limitation in function.    ASSESSMENT/ PLAN OF CARE:  No direct speech therapy is recommended at this time. Recommend rereferral should patient demonstrate change in status.    RECOMMENDATIONS:   1.   DIET: Regular soft solids cut small, thin liquid    2.  POSITION: Positioning fully upright for all p.o. intake and 30 minutes following.    3.  COMPENSATORY STRATEGIES: One-on-one assist patient to feed self, alternate small bites and small sips of solids and liquids at a slow rate.  May utilize straw drink.  May wish to take medications whole in applesauce.    Pt/responsible party agrees with plan of care and has been informed of all alternatives, risks and benefits.                            Anticipated Discharge Disposition (SLP): home with assist (07/01/22 1201)                                                      EDUCATION  The patient has been educated in the following areas:   Dysphagia (Swallowing Impairment) Modified Diet Instruction.              Time Calculation:    Time Calculation- SLP     Row Name 07/01/22 1201             Time Calculation- SLP    SLP Start Time 1045  -TB      SLP Stop Time 1145  -TB      SLP Time Calculation (min) 60 min  -TB      SLP Received On 07/01/22  -TB              Untimed Charges    SLP Eval/Re-eval  ST Eval Oral Pharyng Swallow - 55421  -TB      90081-LR Eval Oral Pharyng Swallow Minutes 60  -TB              Total Minutes    Untimed Charges Total Minutes 60  -TB       Total Minutes 60  -TB            User Key  (r) = Recorded By, (t) = Taken By, (c) = Cosigned By    Initials Name Provider Type    TB Julieth  NOAH Gatica Speech and Language Pathologist                Therapy Charges for Today     Code Description Service Date Service Provider Modifiers Qty    [id number] HC ST EVAL ORAL PHARYNG SWALLOW 4 7/1/2022 Gavi Clancy SLP GN 1               NOAH Lynn  7/1/2022

## 2022-07-01 NOTE — PLAN OF CARE
Goal Outcome Evaluation:  Plan of Care Reviewed With: patient      DYSPHAGIA CRITERIA: Swallow appears grossly functional for soft whole solids.      FUNCTIONAL ASSESSMENT INSTRUMENT: Patient currently scored a level 7 of 7 on Functional Communication Measures for swallowing indicating a 0% limitation in function.    ASSESSMENT/ PLAN OF CARE:  No direct speech therapy is recommended at this time. Recommend rereferral should patient demonstrate change in status.    RECOMMENDATIONS:   1.   DIET: Regular soft solids cut small, thin liquid    2.  POSITION: Positioning fully upright for all p.o. intake and 30 minutes following.    3.  COMPENSATORY STRATEGIES: One-on-one assist patient to feed self, alternate small bites and small sips of solids and liquids at a slow rate.  May utilize straw drink.  May wish to take medications whole in applesauce.

## 2022-07-01 NOTE — PLAN OF CARE
Goal Outcome Evaluation:      Patient more awake today, confused and irritable. Patient and family agree to go home with hospice today.

## (undated) DEVICE — SUT VIC 2/0 CT1 36IN

## (undated) DEVICE — TOTAL ANTERIOR HIP-LF: Brand: MEDLINE INDUSTRIES, INC.

## (undated) DEVICE — PROXIMATE RH ROTATING HEAD SKIN STAPLERS (35 WIDE) CONTAINS 35 STAINLESS STEEL STAPLES: Brand: PROXIMATE

## (undated) DEVICE — GLV SURG SENSICARE SLT PF LF 7 STRL

## (undated) DEVICE — Device

## (undated) DEVICE — PULLOVER TOGA, 2X LARGE: Brand: FLYTE, SURGICOOL

## (undated) DEVICE — APPL CHLORAPREP HI/LITE 26ML ORNG

## (undated) DEVICE — PENCL E/S SMOKEEVAC W/TELESCP CANN

## (undated) DEVICE — GLV SURG SENSICARE SLT PF LF 6.5 STRL

## (undated) DEVICE — MAT FLR ABS W/BLU/LINER 56X72IN WHT

## (undated) DEVICE — GAUZE,SPONGE,4"X4",16PLY,STRL,LF,10/TRAY: Brand: MEDLINE

## (undated) DEVICE — PEEL-AWAY TOGA, 2X LARGE: Brand: FLYTE

## (undated) DEVICE — 450 ML BOTTLE OF 0.05% CHLORHEXIDINE GLUCONATE IN 99.95% STERILE WATER FOR IRRIGATION, USP AND APPLICATOR.: Brand: IRRISEPT ANTIMICROBIAL WOUND LAVAGE

## (undated) DEVICE — BIPOLAR SEALER 23-112-1 AQM 6.0: Brand: AQUAMANTYS™

## (undated) DEVICE — TOWEL,OR,DSP,ST,BLUE,STD,4/PK,20PK/CS: Brand: MEDLINE

## (undated) DEVICE — KT PT POSITION SUPINE HANA/PROFX TABL

## (undated) DEVICE — CVR LEG BOOTLEG F/R NOSKID 33IN

## (undated) DEVICE — GLV SURG SENSICARE SLT PF LF 8.5 STRL

## (undated) DEVICE — ANTIBACTERIAL VIOLET BRAIDED (POLYGLACTIN 910), SYNTHETIC ABSORBABLE SURGICAL SUTURE: Brand: COATED VICRYL

## (undated) DEVICE — STRYKER PERFORMANCE SERIES SAGITTAL BLADE: Brand: STRYKER PERFORMANCE SERIES

## (undated) DEVICE — GLV SURG BIOGEL LTX PF 8 1/2

## (undated) DEVICE — SLV SCD KN/LEN ADJ EXPRSS BLENDED MD 1P/U

## (undated) DEVICE — GLV SURG SENSICARE PI PF LF 7 GRN STRL